# Patient Record
Sex: FEMALE | Race: WHITE | NOT HISPANIC OR LATINO | Employment: PART TIME | URBAN - NONMETROPOLITAN AREA
[De-identification: names, ages, dates, MRNs, and addresses within clinical notes are randomized per-mention and may not be internally consistent; named-entity substitution may affect disease eponyms.]

---

## 2017-10-05 ENCOUNTER — OFFICE VISIT (OUTPATIENT)
Dept: PSYCHIATRY | Facility: CLINIC | Age: 22
End: 2017-10-05

## 2017-10-05 VITALS
BODY MASS INDEX: 43.4 KG/M2 | HEIGHT: 69 IN | WEIGHT: 293 LBS | DIASTOLIC BLOOD PRESSURE: 93 MMHG | HEART RATE: 84 BPM | SYSTOLIC BLOOD PRESSURE: 147 MMHG

## 2017-10-05 DIAGNOSIS — F41.1 GENERALIZED ANXIETY DISORDER: ICD-10-CM

## 2017-10-05 DIAGNOSIS — F33.1 MAJOR DEPRESSIVE DISORDER, RECURRENT EPISODE, MODERATE (HCC): Primary | ICD-10-CM

## 2017-10-05 PROCEDURE — 90792 PSYCH DIAG EVAL W/MED SRVCS: CPT | Performed by: NURSE PRACTITIONER

## 2017-10-05 RX ORDER — PAROXETINE HYDROCHLORIDE 20 MG/1
TABLET, FILM COATED ORAL
COMMUNITY
Start: 2017-09-11 | End: 2017-10-05 | Stop reason: SDUPTHER

## 2017-10-05 RX ORDER — PAROXETINE HYDROCHLORIDE 20 MG/1
20 TABLET, FILM COATED ORAL EVERY EVENING
Qty: 30 TABLET | Refills: 0 | Status: SHIPPED | OUTPATIENT
Start: 2017-10-05 | End: 2017-11-01 | Stop reason: SDUPTHER

## 2017-10-05 RX ORDER — BUPROPION HYDROCHLORIDE 150 MG/1
TABLET, EXTENDED RELEASE ORAL
COMMUNITY
Start: 2017-09-11 | End: 2017-10-05 | Stop reason: SDUPTHER

## 2017-10-05 RX ORDER — BUPROPION HYDROCHLORIDE 150 MG/1
150 TABLET, EXTENDED RELEASE ORAL DAILY
Qty: 30 TABLET | Refills: 0 | Status: SHIPPED | OUTPATIENT
Start: 2017-10-05 | End: 2017-11-01 | Stop reason: SDUPTHER

## 2017-10-05 NOTE — PROGRESS NOTES
"Subjective   Kamryn Gross is a 22 y.o. female who is here today for initial appointment to evaluate for medication options.  She states that she was referred by family with her father seeing Dr Naidu and is having symptoms of depression and anxiety.     Chief Complaint:  Anxiety, depression    History of Present Illness  She states that she has problems with depression and anxiety, she was previously seeing a counselor and provider at [facility] but with numerous different providers she decided that she needed to seek services elsewhere.  She states that she has been diagnosed with mood disorder, severe anxiety, and depression.  She states that she gets sad a lot, she doesn't like hanging with family when she is in a bad state because it makes it worse.  She states that she has crying spells at times because she gets aggravated.  She states that her energy levels are \"not good\"- she feels tired a lot; shares that she use to get out and do things but now she lacks the motivation even to take care of ADLs.  She states that she tends to isolate because she is easily agitated by others.   Reports that she has feelings of being worthless, useless, and hopeless.  She states that she has difficulty falling asleep, she states that she may average between 4-5 hours per night but have nights that she doesn't sleep at all- she has been up for a maximum of 5 days before.  She reports that she has NM of random events that involve death.  She states that her appetite has decreased, reports that she has lost about 16 pounds in 2 months.  Anxiety: worries all the time, feels on edge, overwhelmed, \"what if?\" thoughts, believes that the worse is gong to happen, difficult time with focus, panic attacks with last episode about 2 days ago but unable to identify a trigger symptoms of being shaky, racing heart, cold chills.  She reports difficulty controlling her anger, she can be verbally aggressive towards other, \"hurt peoples feelings\", " "denies any physical or destruction of property.  Denies any prolonged matt, reports that she is impulsive both physical and verbally.  Reports OCD tendencies; denies any ADHD symptoms.  Denies any PTSD symptoms.  Denies any AV hallucinations, denies any paranoia or delusions.  Denies any SI/HI, but has history of self-mutilating behaviors.  Reports symptoms since childhood.      Past Psych History: Previous hospitalization X 2 for suicidal thoughts, previously seen at [facility] for treatment.  History of cutting with last episode about 2 days ago- noted to have superficial cuts to her left wrist, tattoos X 3, tongue/ears/nose piercing.  Denies any history of assault or arrest for violence.  Denies any history of abuse or trauma.     Previous Psych Meds: She is currently on wellbutrin and paxil- currently she feels like they are effective \"when I take them\". Zoloft, seroquel, prazosin, effexor    Substance Abuse: Denies any ETOH, THC, illicit drugs, RX drug abuse.  Smoker: 1/2 ppd.  Caffeine: 24 oz weekly.   MARCUS: no controlled substances noted.  UDS- negative.    Social History: She is currently living in [place] with parents, aunt, and 2 sisters.  Never , current relationship X 4 months and somewhat supportive.  No children.  Graduated from , 1 year of college, able to read and comprehend with no problems.  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  No legal issues.  No .  \"Believes in God\".     Family Psychiatric History: both parents has depression and anxiety, no history of family suicide attempts.  No substance use.      Medical/Surgical History: No history of seizures or concussions.  BC- none currently.  PCP: [facility] ([place]     No Known Allergies    Current Medications:   Current Outpatient Prescriptions   Medication Sig Dispense Refill   • buPROPion SR (WELLBUTRIN SR) 150 MG 12 hr tablet Take 1 tablet by mouth Daily. 30 tablet 0   • PARoxetine (PAXIL) 20 MG tablet Take 1 tablet by " "mouth Every Evening. 30 tablet 0     No current facility-administered medications for this visit.        Review of Systems   Constitutional: Negative for appetite change, chills, diaphoresis, fatigue, fever and unexpected weight change.   HENT: Negative for hearing loss, sore throat, trouble swallowing and voice change.    Eyes: Negative for photophobia and visual disturbance.   Respiratory: Negative for cough, chest tightness and shortness of breath.    Cardiovascular: Negative for chest pain and palpitations.   Gastrointestinal: Negative for abdominal pain, constipation, nausea and vomiting.   Endocrine: Negative for cold intolerance and heat intolerance.   Genitourinary: Negative for dysuria and frequency.   Musculoskeletal: Negative for arthralgias, back pain, joint swelling and neck stiffness.   Skin: Negative for color change and wound.   Allergic/Immunologic: Negative for environmental allergies and immunocompromised state.   Neurological: Negative for dizziness, tremors, seizures, syncope, weakness, light-headedness and headaches.   Hematological: Negative for adenopathy. Does not bruise/bleed easily.      Objective   Physical Exam   Constitutional: She appears well-developed and well-nourished. No distress.   Neurological: She is alert. Coordination and gait normal.   Vitals reviewed.    Blood pressure 147/93, pulse 84, height 69\" (175.3 cm), weight (!) 361 lb (164 kg).    Mental Status Exam:   Hygiene:   fair  Cooperation:  Cooperative  Eye Contact:  Fair  Psychomotor Behavior:  Appropriate  Affect:  Appropriate  Hopelessness: Denies  Speech:  Normal  Thought Process:  Goal directed and Linear  Thought Content:  Mood congurent  Suicidal:  None  Homicidal:  None  Hallucinations:  None  Delusion:  None  Memory:  Intact  Orientation:  Person, Place, Time and Situation  Reliability:  fair  Insight:  Fair  Judgement:  Fair  Impulse Control:  Fair  Physical/Medical Issues:  No     Short-term goals: Patient will " "be compliant with clinic appointments.  Patient will be engaged in therapy, medication compliant with minimal side effects. Patient  will report decrease of symptoms and frequency.    Long-term goals: Patient will have minimal symptoms of  with continued medication management. Patient will be compliant with treatment and appointments.     Problem list: anxiety and depression  Strengths: good personality  Weaknesses: \"looks\"    Assessment/Plan   Problems Addressed this Visit     None      Visit Diagnoses     Major depressive disorder, recurrent episode, moderate    -  Primary    Relevant Medications    buPROPion SR (WELLBUTRIN SR) 150 MG 12 hr tablet    PARoxetine (PAXIL) 20 MG tablet    Generalized anxiety disorder        Relevant Medications    buPROPion SR (WELLBUTRIN SR) 150 MG 12 hr tablet    PARoxetine (PAXIL) 20 MG tablet          Discussed medication options.  Begin wellbutrin for depression and paxil for anxiety and depression; continue hydroxyzine at night to assist with sleep as provided per PCP.  Discussed the risks, benefits, and side effects of the medication; client acknowledged and verbally consented.  Patient is aware to contact the [facility] with any worsening of symptom.  Patient is agreeable to go to the ER or call 911 should they begin SI/HI.  Begin therapy to address coping skills; and behavioral modification to avoid self-inflicting injuries.      Return in 4 weeks  "

## 2017-10-24 ENCOUNTER — OFFICE VISIT (OUTPATIENT)
Dept: PSYCHIATRY | Facility: CLINIC | Age: 22
End: 2017-10-24

## 2017-10-24 DIAGNOSIS — F41.0 PANIC ATTACK: ICD-10-CM

## 2017-10-24 DIAGNOSIS — F41.1 GENERALIZED ANXIETY DISORDER: Primary | ICD-10-CM

## 2017-10-24 DIAGNOSIS — F33.1 MAJOR DEPRESSIVE DISORDER, RECURRENT EPISODE, MODERATE (HCC): ICD-10-CM

## 2017-10-24 PROCEDURE — 90834 PSYTX W PT 45 MINUTES: CPT | Performed by: COUNSELOR
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
## 2017-11-01 RX ORDER — PAROXETINE HYDROCHLORIDE 20 MG/1
20 TABLET, FILM COATED ORAL EVERY EVENING
Qty: 30 TABLET | Refills: 0 | Status: SHIPPED | OUTPATIENT
Start: 2017-11-01 | End: 2017-11-30 | Stop reason: SDUPTHER

## 2017-11-01 RX ORDER — BUPROPION HYDROCHLORIDE 150 MG/1
150 TABLET, EXTENDED RELEASE ORAL DAILY
Qty: 30 TABLET | Refills: 0 | Status: SHIPPED | OUTPATIENT
Start: 2017-11-01 | End: 2017-11-30 | Stop reason: SDUPTHER

## 2017-11-30 ENCOUNTER — OFFICE VISIT (OUTPATIENT)
Dept: PSYCHIATRY | Facility: CLINIC | Age: 22
End: 2017-11-30

## 2017-11-30 VITALS
WEIGHT: 293 LBS | HEART RATE: 91 BPM | BODY MASS INDEX: 43.4 KG/M2 | SYSTOLIC BLOOD PRESSURE: 137 MMHG | DIASTOLIC BLOOD PRESSURE: 83 MMHG | HEIGHT: 69 IN

## 2017-11-30 DIAGNOSIS — F33.1 MAJOR DEPRESSIVE DISORDER, RECURRENT EPISODE, MODERATE (HCC): Primary | ICD-10-CM

## 2017-11-30 DIAGNOSIS — F41.1 GENERALIZED ANXIETY DISORDER: ICD-10-CM

## 2017-11-30 DIAGNOSIS — F41.0 PANIC ATTACK: ICD-10-CM

## 2017-11-30 PROCEDURE — 99213 OFFICE O/P EST LOW 20 MIN: CPT | Performed by: NURSE PRACTITIONER

## 2017-11-30 RX ORDER — HYDROXYZINE PAMOATE 25 MG/1
25 CAPSULE ORAL 3 TIMES DAILY PRN
Qty: 90 CAPSULE | Refills: 0 | Status: SHIPPED | OUTPATIENT
Start: 2017-11-30 | End: 2018-01-11 | Stop reason: SDUPTHER

## 2017-11-30 RX ORDER — BUPROPION HYDROCHLORIDE 150 MG/1
300 TABLET, EXTENDED RELEASE ORAL DAILY
Qty: 60 TABLET | Refills: 0 | Status: SHIPPED | OUTPATIENT
Start: 2017-11-30 | End: 2018-01-11 | Stop reason: DRUGHIGH

## 2017-11-30 RX ORDER — PAROXETINE HYDROCHLORIDE 20 MG/1
20 TABLET, FILM COATED ORAL EVERY EVENING
Qty: 30 TABLET | Refills: 0 | Status: SHIPPED | OUTPATIENT
Start: 2017-11-30 | End: 2018-01-11 | Stop reason: SDUPTHER
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
## 2018-01-11 ENCOUNTER — OFFICE VISIT (OUTPATIENT)
Dept: PSYCHIATRY | Facility: CLINIC | Age: 23
End: 2018-01-11

## 2018-01-11 VITALS
WEIGHT: 293 LBS | SYSTOLIC BLOOD PRESSURE: 132 MMHG | HEART RATE: 76 BPM | HEIGHT: 69 IN | DIASTOLIC BLOOD PRESSURE: 81 MMHG | BODY MASS INDEX: 43.4 KG/M2

## 2018-01-11 DIAGNOSIS — F33.1 MAJOR DEPRESSIVE DISORDER, RECURRENT EPISODE, MODERATE (HCC): Primary | ICD-10-CM

## 2018-01-11 DIAGNOSIS — F41.1 GENERALIZED ANXIETY DISORDER: ICD-10-CM

## 2018-01-11 PROCEDURE — 99213 OFFICE O/P EST LOW 20 MIN: CPT | Performed by: NURSE PRACTITIONER

## 2018-01-11 RX ORDER — PAROXETINE HYDROCHLORIDE 20 MG/1
20 TABLET, FILM COATED ORAL EVERY EVENING
Qty: 30 TABLET | Refills: 2 | Status: SHIPPED | OUTPATIENT
Start: 2018-01-11 | End: 2018-03-29 | Stop reason: SDUPTHER

## 2018-01-11 RX ORDER — HYDROXYZINE PAMOATE 25 MG/1
25 CAPSULE ORAL 3 TIMES DAILY PRN
Qty: 90 CAPSULE | Refills: 2 | Status: SHIPPED | OUTPATIENT
Start: 2018-01-11 | End: 2018-05-24

## 2018-01-11 RX ORDER — BUPROPION HYDROCHLORIDE 300 MG/1
300 TABLET ORAL EVERY MORNING
Qty: 30 TABLET | Refills: 2 | Status: SHIPPED | OUTPATIENT
Start: 2018-01-11 | End: 2018-03-29 | Stop reason: SDUPTHER

## 2018-01-11 NOTE — PROGRESS NOTES
"Subjective   Kamryn Gross is a 22 y.o. female who is here today for initial appointment to evaluate for medication options.  She states that she was referred by family with her father seeing Dr Naidu and is having symptoms of depression and anxiety.     Chief Complaint:  Anxiety, depression    History of Present Illness   She states that she is doing ok with the current medications, denies any SE or problems.  She states that with the increase in the medications she is no longer sleeping as much, she is more motivated to get up and do things.  She rates her depression 4/10 with 10 being the worse, she has random crying spells for no reason, and periods of isolation.  She rates her anxiety 7/10 with 10 being the worse, she states that she can have periods of her heart racing and begin to panic- takes a hydroxyzine but then wakes up still with anxiety.   Recommended that she have her thyroid monitored, states that she had blood work yesterday.  She states that she is sleeping about 10 hours per night with no NM.  Appetite has been good, she has lost 9 pounds over the Holidays- she is watching her portions sizes, etc.  She states that she is currently unemployed and not able to help out financial. She denies any new health issues.  Denies any AV hallucinations, denies any SI/HI.     Previous Psych Meds: She is currently on wellbutrin and paxil- currently she feels like they are effective \"when I take them\". Zoloft, seroquel, prazosin, effexor      No Known Allergies    Current Medications:   Current Outpatient Prescriptions   Medication Sig Dispense Refill   • buPROPion XL (WELLBUTRIN XL) 300 MG 24 hr tablet Take 1 tablet by mouth Every Morning. 30 tablet 2   • hydrOXYzine (VISTARIL) 25 MG capsule Take 1 capsule by mouth 3 (Three) Times a Day As Needed for Anxiety. 90 capsule 2   • PARoxetine (PAXIL) 20 MG tablet Take 1 tablet by mouth Every Evening. 30 tablet 2     No current facility-administered medications for " "this visit.        Review of Systems   Constitutional: Negative for appetite change, chills, diaphoresis, fatigue, fever and unexpected weight change.   HENT: Negative for hearing loss, sore throat, trouble swallowing and voice change.    Eyes: Negative for photophobia and visual disturbance.   Respiratory: Negative for cough, chest tightness and shortness of breath.    Cardiovascular: Negative for chest pain and palpitations.   Gastrointestinal: Negative for abdominal pain, constipation, nausea and vomiting.   Endocrine: Negative for cold intolerance and heat intolerance.   Genitourinary: Negative for dysuria and frequency.   Musculoskeletal: Negative for arthralgias, back pain, joint swelling and neck stiffness.   Skin: Negative for color change and wound.   Allergic/Immunologic: Negative for environmental allergies and immunocompromised state.   Neurological: Negative for dizziness, tremors, seizures, syncope, weakness, light-headedness and headaches.   Hematological: Negative for adenopathy. Does not bruise/bleed easily.      Objective   Physical Exam   Constitutional: She appears well-developed and well-nourished. No distress.   Neurological: She is alert. Coordination and gait normal.   Vitals reviewed.    Blood pressure 132/81, pulse 76, height 175.3 cm (69\"), weight (!) 158 kg (348 lb).    Mental Status Exam:   Hygiene:   fair  Cooperation:  Cooperative  Eye Contact:  Fair  Psychomotor Behavior:  Appropriate  Affect:  Appropriate  Hopelessness: Denies  Speech:  Normal  Thought Process:  Goal directed and Linear  Thought Content:  Mood congurent  Suicidal:  None  Homicidal:  None  Hallucinations:  None  Delusion:  None  Memory:  Intact  Orientation:  Person, Place, Time and Situation  Reliability:  fair  Insight:  Fair  Judgement:  Fair  Impulse Control:  Fair  Physical/Medical Issues:  No       Assessment/Plan   Problems Addressed this Visit     None      Visit Diagnoses     Major depressive disorder, " recurrent episode, moderate    -  Primary    Relevant Medications    hydrOXYzine (VISTARIL) 25 MG capsule    PARoxetine (PAXIL) 20 MG tablet    buPROPion XL (WELLBUTRIN XL) 300 MG 24 hr tablet    Generalized anxiety disorder        Relevant Medications    hydrOXYzine (VISTARIL) 25 MG capsule    PARoxetine (PAXIL) 20 MG tablet    buPROPion XL (WELLBUTRIN XL) 300 MG 24 hr tablet          Discussed medication options.  Discussed the risks, benefits, and side effects of the medication; client acknowledged and verbally consented.  Patient is aware to contact the [facility] with any worsening of symptom.  Patient is agreeable to go to the ER or call 911 should they begin SI/HI.  Begin therapy to address coping skills; and behavioral modification to avoid self-inflicting injuries.      Return in 12 weeks

## 2018-03-29 ENCOUNTER — OFFICE VISIT (OUTPATIENT)
Dept: PSYCHIATRY | Facility: CLINIC | Age: 23
End: 2018-03-29

## 2018-03-29 VITALS
WEIGHT: 293 LBS | HEART RATE: 88 BPM | SYSTOLIC BLOOD PRESSURE: 125 MMHG | DIASTOLIC BLOOD PRESSURE: 82 MMHG | BODY MASS INDEX: 43.4 KG/M2 | HEIGHT: 69 IN

## 2018-03-29 DIAGNOSIS — F41.1 GENERALIZED ANXIETY DISORDER: Primary | ICD-10-CM

## 2018-03-29 DIAGNOSIS — F33.1 MAJOR DEPRESSIVE DISORDER, RECURRENT EPISODE, MODERATE (HCC): ICD-10-CM

## 2018-03-29 PROCEDURE — 99214 OFFICE O/P EST MOD 30 MIN: CPT | Performed by: NURSE PRACTITIONER

## 2018-03-29 RX ORDER — BUPROPION HYDROCHLORIDE 300 MG/1
300 TABLET ORAL EVERY MORNING
Qty: 30 TABLET | Refills: 1 | Status: SHIPPED | OUTPATIENT
Start: 2018-03-29 | End: 2018-05-24 | Stop reason: SDUPTHER

## 2018-03-29 RX ORDER — PAROXETINE 30 MG/1
30 TABLET, FILM COATED ORAL EVERY EVENING
Qty: 30 TABLET | Refills: 1 | Status: SHIPPED | OUTPATIENT
Start: 2018-03-29 | End: 2018-05-24

## 2018-03-29 NOTE — PROGRESS NOTES
"Subjective   Kamryn Gross is a 22 y.o. female who is here today for initial appointment to evaluate for medication options.  She states that she was referred by family with her father seeing Dr Naidu and is having symptoms of depression and anxiety.     Chief Complaint:  Anxiety, depression    History of Present Illness  She states she is \"doing good.\" She states she feels more anxious lately and feels as though something \"needs upped.\" She denies any SE. She rates hear anxiety at 8/10 on scale of 1-10 with 10 being the worse. She shares that she is having increased heart rate, increased sweating, trembling in legs, worries and on edge. She rates her depression at 4/10 on scale of 1-10 with 10 being the worse. Reports symptoms crying episodes with no known reason but shares that this has improved. She states her sleep is \"not good.\" She shares she is having a hard time falling asleep due to \"Racing thoughts.\" She states she gets an average of 3 hours of sleep per night with few NM regarding death of her parents. She states her appetite has remained the same. 8 pound weight loss noted since last visit. Body mass index is 50.21 kg/m². She shares she has been trying to lose weight, shares she has cut down her portions and started walking at home. She shares that long distance relationship with her boyfriend has been stressing her out. She shares she is worried he may be talking to others where he lives. She denies any new medical issues or antibiotics since last visit. She denies any SI/HI. Denies any AV hallucinations.     Discussed increasing Paxil 30 mg daily for depression and anxiety. PCP is now prescribing hydroxyzine.     Previous Psych Meds: She is currently on wellbutrin and paxil- currently she feels like they are effective \"when I take them\". Zoloft, seroquel, prazosin, effexor      No Known Allergies    Current Medications:   Current Outpatient Prescriptions   Medication Sig Dispense Refill   • buPROPion " "XL (WELLBUTRIN XL) 300 MG 24 hr tablet Take 1 tablet by mouth Every Morning. 30 tablet 1   • hydrOXYzine (VISTARIL) 25 MG capsule Take 1 capsule by mouth 3 (Three) Times a Day As Needed for Anxiety. 90 capsule 2   • PARoxetine (PAXIL) 30 MG tablet Take 1 tablet by mouth Every Evening. 30 tablet 1     No current facility-administered medications for this visit.        Review of Systems   Constitutional: Negative for appetite change, chills, diaphoresis, fatigue, fever and unexpected weight change.   HENT: Negative for hearing loss, sore throat, trouble swallowing and voice change.    Eyes: Negative for photophobia and visual disturbance.   Respiratory: Negative for cough, chest tightness and shortness of breath.    Cardiovascular: Negative for chest pain and palpitations.   Gastrointestinal: Negative for abdominal pain, constipation, nausea and vomiting.   Endocrine: Negative for cold intolerance and heat intolerance.   Genitourinary: Negative for dysuria and frequency.   Musculoskeletal: Negative for arthralgias, back pain, joint swelling and neck stiffness.   Skin: Negative for color change and wound.   Allergic/Immunologic: Negative for environmental allergies and immunocompromised state.   Neurological: Negative for dizziness, tremors, seizures, syncope, weakness, light-headedness and headaches.   Hematological: Negative for adenopathy. Does not bruise/bleed easily.      Objective   Physical Exam   Constitutional: She appears well-developed and well-nourished. No distress.   Neurological: She is alert. Coordination and gait normal.   Vitals reviewed.    Blood pressure 125/82, pulse 88, height 175.3 cm (69\"), weight (!) 154 kg (340 lb).    Mental Status Exam:   Hygiene:   fair  Cooperation:  Cooperative  Eye Contact:  Fair  Psychomotor Behavior:  Appropriate  Affect:  Appropriate  Hopelessness: Denies  Speech:  Normal  Thought Process:  Goal directed and Linear  Thought Content:  Mood congurent  Suicidal:  " None  Homicidal:  None  Hallucinations:  None  Delusion:  None  Memory:  Intact  Orientation:  Person, Place, Time and Situation  Reliability:  fair  Insight:  Fair  Judgement:  Fair  Impulse Control:  Fair  Physical/Medical Issues:  No       Assessment/Plan   Problems Addressed this Visit     None      Visit Diagnoses     Generalized anxiety disorder    -  Primary    Relevant Medications    PARoxetine (PAXIL) 30 MG tablet    buPROPion XL (WELLBUTRIN XL) 300 MG 24 hr tablet    Major depressive disorder, recurrent episode, moderate        Relevant Medications    PARoxetine (PAXIL) 30 MG tablet    buPROPion XL (WELLBUTRIN XL) 300 MG 24 hr tablet          Discussed medication options. Will try increase in Paxil to assist with increased anxiety. PCP is now prescribing Vistaril.  Discussed the risks, benefits, and side effects of the medication; client acknowledged and verbally consented.  Patient is aware to contact the [facility] with any worsening of symptom.  Patient is agreeable to go to the ER or call 911 should they begin SI/HI.  Begin therapy to address coping skills; and behavioral modification to avoid self-inflicting injuries.      Return in 8 weeks

## 2018-05-24 ENCOUNTER — OFFICE VISIT (OUTPATIENT)
Dept: PSYCHIATRY | Facility: CLINIC | Age: 23
End: 2018-05-24

## 2018-05-24 VITALS
SYSTOLIC BLOOD PRESSURE: 122 MMHG | HEART RATE: 82 BPM | DIASTOLIC BLOOD PRESSURE: 78 MMHG | HEIGHT: 69 IN | BODY MASS INDEX: 43.4 KG/M2 | WEIGHT: 293 LBS

## 2018-05-24 DIAGNOSIS — F41.1 GENERALIZED ANXIETY DISORDER: Primary | ICD-10-CM

## 2018-05-24 DIAGNOSIS — F33.1 MAJOR DEPRESSIVE DISORDER, RECURRENT EPISODE, MODERATE (HCC): ICD-10-CM

## 2018-05-24 DIAGNOSIS — F60.3 BORDERLINE PERSONALITY DISORDER (HCC): ICD-10-CM

## 2018-05-24 PROCEDURE — 99214 OFFICE O/P EST MOD 30 MIN: CPT | Performed by: NURSE PRACTITIONER

## 2018-05-24 RX ORDER — PAROXETINE HYDROCHLORIDE 40 MG/1
40 TABLET, FILM COATED ORAL EVERY MORNING
Qty: 30 TABLET | Refills: 0 | Status: SHIPPED | OUTPATIENT
Start: 2018-05-24 | End: 2018-06-28 | Stop reason: SDUPTHER

## 2018-05-24 RX ORDER — PAROXETINE HYDROCHLORIDE 40 MG/1
TABLET, FILM COATED ORAL
COMMUNITY
Start: 2018-05-17 | End: 2018-05-24 | Stop reason: SDUPTHER

## 2018-05-24 RX ORDER — OXCARBAZEPINE 150 MG/1
150 TABLET, FILM COATED ORAL 2 TIMES DAILY
Qty: 60 TABLET | Refills: 0 | Status: SHIPPED | OUTPATIENT
Start: 2018-05-24 | End: 2018-06-28 | Stop reason: SDUPTHER

## 2018-05-24 RX ORDER — BUPROPION HYDROCHLORIDE 300 MG/1
300 TABLET ORAL EVERY MORNING
Qty: 30 TABLET | Refills: 0 | Status: SHIPPED | OUTPATIENT
Start: 2018-05-24 | End: 2018-06-28 | Stop reason: SDUPTHER

## 2018-05-24 NOTE — PROGRESS NOTES
"  Subjective   Kamryn Gross is a 22 y.o. female is here today for medication management follow-up at [facility], she presents to her appointment on time with her mother with whom she gives permission to speak in front of openly.    Chief Complaint: Depression and anxiety      History of Present Illness  She states that she is not doing well with her medications; she states that she has a lot of things going on.  She states that she went to [facility] last Monday May 14.  She states that she had been feeling depressed, didn't want to deal with people, she didn't feel wanted.  She states that she took a handful of vistaril, she had told a friend that she didn't want to live and the friend contacted mother on messenger to report it.  Mother contacted the ambulance after patient was checked on and she was \"out\"; she was not charcoaled but was placed on fluids and heart monitor.  She was placed inpatient for 4 days; she didn't have any medication changes.  She was Paxil and Wellbutrin but doesn't feel like it is helpful.  She states that she is staying secluded, a lot of anxiety, she has no energy or motivation and had to make self get up and care for her ADLs, she states that she has crying spells for no particular reason.  She states that she has feelings of being worthless, useless, and hopeless.  She states that she doesn't sleep well because she can't shut her brain, 2-3 hours per night with no NM.  Appetite is decreased; \" I am just not hungry\".  Body mass index is 48.44 kg/m².  She states that she worries day by day about the littlest things with panic attacks with shakes, scared, heart beats fast.  Anger: she has problems controlling her anger, she tends to physically violent and hits objects with verbal aggression.  Denies any history of abuse; denies any trauma.  She states that she is mumbles, unable to make out what is being said; denies any command hallucinations.  Denies any visual " hallucinations.  She states that she has been having suicidal thoughts with last episode yesterday but thoughts went away when Carlton (dawit) got there.   She adamantly denies any current plan or intent.  Denies any intent on SI.   She denies any physical health issues.  She currently is stressed out because of where she lives; she lives in a bad neighborhood with her parent.  She states that the only time she gets attention is when she acts out.        Mother states that she has mood swings, can't keep a job because she can't deal with people.  She states that the seroquel was not effective at all for her.      Will continue paxil and Wellbutrin but add stabilizer.  Will begin trileptal for her mood.  Recommended OTC benadryl to assist with sleep.          The following portions of the patient's history were reviewed and updated as appropriate: allergies, current medications, past family history, past medical history, past social history, past surgical history and problem list.    Review of Systems   Constitutional: Negative for appetite change, chills, diaphoresis, fatigue, fever and unexpected weight change.   HENT: Negative for hearing loss, sore throat, trouble swallowing and voice change.    Eyes: Negative for photophobia and visual disturbance.   Respiratory: Negative for cough, chest tightness and shortness of breath.    Cardiovascular: Negative for chest pain and palpitations.   Gastrointestinal: Negative for abdominal pain, constipation, nausea and vomiting.   Endocrine: Negative for cold intolerance and heat intolerance.   Genitourinary: Negative for dysuria and frequency.   Musculoskeletal: Negative for arthralgias, back pain, joint swelling and neck stiffness.   Skin: Negative for color change and wound.   Allergic/Immunologic: Negative for environmental allergies and immunocompromised state.   Neurological: Negative for dizziness, tremors, seizures, syncope, weakness, light-headedness and headaches.  "  Hematological: Negative for adenopathy. Does not bruise/bleed easily.       Objective   Physical Exam   Constitutional: She appears well-developed and well-nourished. No distress.   Neurological: She is alert. Coordination and gait normal.   Vitals reviewed.    Blood pressure 122/78, pulse 82, height 175.3 cm (69\"), weight (!) 149 kg (328 lb).    Medication List:   Current Outpatient Prescriptions   Medication Sig Dispense Refill   • buPROPion XL (WELLBUTRIN XL) 300 MG 24 hr tablet Take 1 tablet by mouth Every Morning. 30 tablet 0   • OXcarbazepine (TRILEPTAL) 150 MG tablet Take 1 tablet by mouth 2 (Two) Times a Day. 60 tablet 0   • PARoxetine (PAXIL) 40 MG tablet Take 1 tablet by mouth Every Morning. 30 tablet 0     No current facility-administered medications for this visit.        Mental Status Exam:   Hygiene:   fair  Cooperation:  Guarded  Eye Contact:  Fair  Psychomotor Behavior:  Slow  Affect:  Blunted  Hopelessness: 5  Speech:  Monotone  Thought Process:  Linear  Thought Content:  Mood congurent  Suicidal:  Suicidal Ideation  Homicidal:  None  Hallucinations:  None  Delusion:  None  Memory:  Intact  Orientation:  Person, Place, Time and Situation  Reliability:  fair  Insight:  Poor  Judgement:  Poor  Impulse Control:  Poor  Physical/Medical Issues:  No     Assessment/Plan   Problems Addressed this Visit     None      Visit Diagnoses     Generalized anxiety disorder    -  Primary    Relevant Medications    buPROPion XL (WELLBUTRIN XL) 300 MG 24 hr tablet    PARoxetine (PAXIL) 40 MG tablet    Major depressive disorder, recurrent episode, moderate        Relevant Medications    buPROPion XL (WELLBUTRIN XL) 300 MG 24 hr tablet    PARoxetine (PAXIL) 40 MG tablet    Borderline personality disorder        Relevant Medications    buPROPion XL (WELLBUTRIN XL) 300 MG 24 hr tablet    PARoxetine (PAXIL) 40 MG tablet        Trileptal 150mg twice daily- mood      Discussed medication options.  Reviewed the risks, " benefits, and side effects of the medications; patient acknowledged and verbally consented.  Patient is agreeable to call the [facility].  Patient is aware to call 911 or go to the nearest ER should begin having SI/HI.     Prognosis: Guarded dependent on medication, follow up appointment and treatment plan compliance     Functionality: Poor.  Symptoms inhibit her ability to communicate her feelings with others in a self safe environment.     Return in 4 weeks at [facility] related to uncertainty of behaviors.

## 2018-06-29 RX ORDER — BUPROPION HYDROCHLORIDE 300 MG/1
300 TABLET ORAL EVERY MORNING
Qty: 30 TABLET | Refills: 0 | Status: SHIPPED | OUTPATIENT
Start: 2018-06-29 | End: 2018-07-18 | Stop reason: SDUPTHER

## 2018-06-29 RX ORDER — OXCARBAZEPINE 150 MG/1
150 TABLET, FILM COATED ORAL 2 TIMES DAILY
Qty: 60 TABLET | Refills: 0 | Status: SHIPPED | OUTPATIENT
Start: 2018-06-29 | End: 2018-07-18 | Stop reason: SDUPTHER

## 2018-06-29 RX ORDER — PAROXETINE HYDROCHLORIDE 40 MG/1
40 TABLET, FILM COATED ORAL EVERY MORNING
Qty: 30 TABLET | Refills: 0 | Status: SHIPPED | OUTPATIENT
Start: 2018-06-29 | End: 2018-07-18 | Stop reason: SDUPTHER

## 2018-07-18 RX ORDER — BUPROPION HYDROCHLORIDE 300 MG/1
300 TABLET ORAL EVERY MORNING
Qty: 30 TABLET | Refills: 0 | Status: SHIPPED | OUTPATIENT
Start: 2018-07-18 | End: 2020-11-06

## 2018-07-18 RX ORDER — OXCARBAZEPINE 150 MG/1
150 TABLET, FILM COATED ORAL 2 TIMES DAILY
Qty: 60 TABLET | Refills: 0 | Status: SHIPPED | OUTPATIENT
Start: 2018-07-18 | End: 2018-07-26 | Stop reason: SDUPTHER

## 2018-07-18 RX ORDER — PAROXETINE HYDROCHLORIDE 40 MG/1
40 TABLET, FILM COATED ORAL EVERY MORNING
Qty: 30 TABLET | Refills: 0 | Status: SHIPPED | OUTPATIENT
Start: 2018-07-18 | End: 2020-10-13 | Stop reason: ALTCHOICE

## 2018-07-26 RX ORDER — OXCARBAZEPINE 150 MG/1
150 TABLET, FILM COATED ORAL 2 TIMES DAILY
Qty: 60 TABLET | Refills: 0 | Status: SHIPPED | OUTPATIENT
Start: 2018-07-26 | End: 2020-10-13 | Stop reason: ALTCHOICE

## 2020-10-13 ENCOUNTER — OFFICE VISIT (OUTPATIENT)
Dept: FAMILY MEDICINE CLINIC | Facility: CLINIC | Age: 25
End: 2020-10-13

## 2020-10-13 VITALS
HEIGHT: 70 IN | HEART RATE: 72 BPM | SYSTOLIC BLOOD PRESSURE: 120 MMHG | TEMPERATURE: 97.8 F | OXYGEN SATURATION: 98 % | DIASTOLIC BLOOD PRESSURE: 82 MMHG | WEIGHT: 293 LBS | BODY MASS INDEX: 41.95 KG/M2 | RESPIRATION RATE: 20 BRPM

## 2020-10-13 DIAGNOSIS — R51.9 ACUTE NONINTRACTABLE HEADACHE, UNSPECIFIED HEADACHE TYPE: Primary | ICD-10-CM

## 2020-10-13 PROCEDURE — 99213 OFFICE O/P EST LOW 20 MIN: CPT | Performed by: FAMILY MEDICINE

## 2020-10-13 RX ORDER — BUPROPION HYDROCHLORIDE 150 MG/1
150 TABLET ORAL DAILY
COMMUNITY
End: 2020-11-06 | Stop reason: SDUPTHER

## 2020-10-13 RX ORDER — SUMATRIPTAN 25 MG/1
TABLET, FILM COATED ORAL
Qty: 10 TABLET | Refills: 0 | Status: SHIPPED | OUTPATIENT
Start: 2020-10-13 | End: 2020-12-11 | Stop reason: SDUPTHER

## 2020-10-13 RX ORDER — FLUOXETINE HYDROCHLORIDE 20 MG/1
20 CAPSULE ORAL DAILY
COMMUNITY
End: 2020-11-06 | Stop reason: SDUPTHER

## 2020-10-13 NOTE — PATIENT INSTRUCTIONS
General Headache Without Cause  A headache is pain or discomfort that is felt around the head or neck area. There are many causes and types of headaches. In some cases, the cause may not be found.  Follow these instructions at home:  Watch your condition for any changes. Let your doctor know about them. Take these steps to help with your condition:  Managing pain         · Take over-the-counter and prescription medicines only as told by your doctor.  · Lie down in a dark, quiet room when you have a headache.  · If told, put ice on your head and neck area:  ? Put ice in a plastic bag.  ? Place a towel between your skin and the bag.  ? Leave the ice on for 20 minutes, 2-3 times per day.  · If told, put heat on the affected area. Use the heat source that your doctor recommends, such as a moist heat pack or a heating pad.  ? Place a towel between your skin and the heat source.  ? Leave the heat on for 20-30 minutes.  ? Remove the heat if your skin turns bright red. This is very important if you are unable to feel pain, heat, or cold. You may have a greater risk of getting burned.  · Keep lights dim if bright lights bother you or make your headaches worse.  Eating and drinking  · Eat meals on a regular schedule.  · If you drink alcohol:  ? Limit how much you use to:  § 0-1 drink a day for women.  § 0-2 drinks a day for men.  ? Be aware of how much alcohol is in your drink. In the [place] one drink equals one 12 oz bottle of beer (355 mL), one 5 oz glass of wine (148 mL), or one 1½ oz glass of hard liquor (44 mL).  · Stop drinking caffeine, or reduce how much caffeine you drink.  General instructions    · Keep a journal to find out if certain things bring on headaches. For example, write down:  ? What you eat and drink.  ? How much sleep you get.  ? Any change to your diet or medicines.  · Get a massage or try other ways to relax.  · Limit stress.  · Sit up straight. Do not tighten (tense) your muscles.  · Do not use any  products that contain nicotine or tobacco. This includes cigarettes, e-cigarettes, and chewing tobacco. If you need help quitting, ask your doctor.  · Exercise regularly as told by your doctor.  · Get enough sleep. This often means 7-9 hours of sleep each night.  · Keep all follow-up visits as told by your doctor. This is important.  Contact a doctor if:  · Your symptoms are not helped by medicine.  · You have a headache that feels different than the other headaches.  · You feel sick to your stomach (nauseous) or you throw up (vomit).  · You have a fever.  Get help right away if:  · Your headache gets very bad quickly.  · Your headache gets worse after a lot of physical activity.  · You keep throwing up.  · You have a stiff neck.  · You have trouble seeing.  · You have trouble speaking.  · You have pain in the eye or ear.  · Your muscles are weak or you lose muscle control.  · You lose your balance or have trouble walking.  · You feel like you will pass out (faint) or you pass out.  · You are mixed up (confused).  · You have a seizure.  Summary  · A headache is pain or discomfort that is felt around the head or neck area.  · There are many causes and types of headaches. In some cases, the cause may not be found.  · Keep a journal to help find out what causes your headaches. Watch your condition for any changes. Let your doctor know about them.  · Contact a doctor if you have a headache that is different from usual, or if your headache is not helped by medicine.  · Get help right away if your headache gets very bad, you throw up, you have trouble seeing, you lose your balance, or you have a seizure.  This information is not intended to replace advice given to you by your health care provider. Make sure you discuss any questions you have with your health care provider.  Document Released: 09/26/2009 Document Revised: 07/08/2019 Document Reviewed: 07/08/2019  Elsevier Patient Education © 2020 [employer]    Migraine  Headache  A migraine headache is a very strong throbbing pain on one side or both sides of your head. This type of headache can also cause other symptoms. It can last from 4 hours to 3 days. Talk with your doctor about what things may bring on (trigger) this condition.  What are the causes?  The exact cause of this condition is not known. This condition may be triggered or caused by:  · Drinking alcohol.  · Smoking.  · Taking medicines, such as:  ? Medicine used to treat chest pain (nitroglycerin).  ? Birth control pills.  ? Estrogen.  ? Some blood pressure medicines.  · Eating or drinking certain products.  · Doing physical activity.  Other things that may trigger a migraine headache include:  · Having a menstrual period.  · Pregnancy.  · Hunger.  · Stress.  · Not getting enough sleep or getting too much sleep.  · Weather changes.  · Tiredness (fatigue).  What increases the risk?  · Being 25-55 years old.  · Being female.  · Having a family history of migraine headaches.  · Being .  · Having depression or anxiety.  · Being very overweight.  What are the signs or symptoms?  · A throbbing pain. This pain may:  ? Happen in any area of the head, such as on one side or both sides.  ? Make it hard to do daily activities.  ? Get worse with physical activity.  ? Get worse around bright lights or loud noises.  · Other symptoms may include:  ? Feeling sick to your stomach (nauseous).  ? Vomiting.  ? Dizziness.  ? Being sensitive to bright lights, loud noises, or smells.  · Before you get a migraine headache, you may get warning signs (an aura). An aura may include:  ? Seeing flashing lights or having blind spots.  ? Seeing bright spots, halos, or zigzag lines.  ? Having tunnel vision or blurred vision.  ? Having numbness or a tingling feeling.  ? Having trouble talking.  ? Having weak muscles.  · Some people have symptoms after a migraine headache (postdromal phase), such as:  ? Tiredness.  ? Trouble thinking  (concentrating).  How is this treated?  · Taking medicines that:  ? Relieve pain.  ? Relieve the feeling of being sick to your stomach.  ? Prevent migraine headaches.  · Treatment may also include:  ? Having acupuncture.  ? Avoiding foods that bring on migraine headaches.  ? Learning ways to control your body functions (biofeedback).  ? Therapy to help you know and deal with negative thoughts (cognitive behavioral therapy).  Follow these instructions at home:  Medicines  · Take over-the-counter and prescription medicines only as told by your doctor.  · Ask your doctor if the medicine prescribed to you:  ? Requires you to avoid driving or using heavy machinery.  ? Can cause trouble pooping (constipation). You may need to take these steps to prevent or treat trouble pooping:  § Drink enough fluid to keep your pee (urine) pale yellow.  § Take over-the-counter or prescription medicines.  § Eat foods that are high in fiber. These include beans, whole grains, and fresh fruits and vegetables.  § Limit foods that are high in fat and sugar. These include fried or sweet foods.  Lifestyle  · Do not drink alcohol.  · Do not use any products that contain nicotine or tobacco, such as cigarettes, e-cigarettes, and chewing tobacco. If you need help quitting, ask your doctor.  · Get at least 8 hours of sleep every night.  · Limit and deal with stress.  General instructions         · Keep a journal to find out what may bring on your migraine headaches. For example, write down:  ? What you eat and drink.  ? How much sleep you get.  ? Any change in what you eat or drink.  ? Any change in your medicines.  · If you have a migraine headache:  ? Avoid things that make your symptoms worse, such as bright lights.  ? It may help to lie down in a dark, quiet room.  ? Do not drive or use heavy machinery.  ? Ask your doctor what activities are safe for you.  · Keep all follow-up visits as told by your doctor. This is important.  Contact a doctor  if:  · You get a migraine headache that is different or worse than others you have had.  · You have more than 15 headache days in one month.  Get help right away if:  · Your migraine headache gets very bad.  · Your migraine headache lasts longer than 72 hours.  · You have a fever.  · You have a stiff neck.  · You have trouble seeing.  · Your muscles feel weak or like you cannot control them.  · You start to lose your balance a lot.  · You start to have trouble walking.  · You pass out (faint).  · You have a seizure.  Summary  · A migraine headache is a very strong throbbing pain on one side or both sides of your head. These headaches can also cause other symptoms.  · This condition may be treated with medicines and changes to your lifestyle.  · Keep a journal to find out what may bring on your migraine headaches.  · Contact a doctor if you get a migraine headache that is different or worse than others you have had.  · Contact your doctor if you have more than 15 headache days in a month.  This information is not intended to replace advice given to you by your health care provider. Make sure you discuss any questions you have with your health care provider.  Document Released: 09/26/2009 Document Revised: 04/10/2020 Document Reviewed: 01/30/2020  Elsevier Patient Education © 2020 [employer]

## 2020-10-15 ENCOUNTER — OFFICE VISIT (OUTPATIENT)
Dept: FAMILY MEDICINE CLINIC | Facility: CLINIC | Age: 25
End: 2020-10-15

## 2020-10-15 VITALS
WEIGHT: 293 LBS | HEIGHT: 70 IN | TEMPERATURE: 98 F | BODY MASS INDEX: 41.95 KG/M2 | HEART RATE: 66 BPM | DIASTOLIC BLOOD PRESSURE: 76 MMHG | SYSTOLIC BLOOD PRESSURE: 131 MMHG | OXYGEN SATURATION: 98 %

## 2020-10-15 DIAGNOSIS — Z00.00 ROUTINE GENERAL MEDICAL EXAMINATION AT A HEALTH CARE FACILITY: ICD-10-CM

## 2020-10-15 DIAGNOSIS — F41.9 ANXIETY: ICD-10-CM

## 2020-10-15 DIAGNOSIS — R51.9 ACUTE NONINTRACTABLE HEADACHE, UNSPECIFIED HEADACHE TYPE: ICD-10-CM

## 2020-10-15 DIAGNOSIS — F33.1 MAJOR DEPRESSIVE DISORDER, RECURRENT, MODERATE (HCC): Primary | ICD-10-CM

## 2020-10-15 DIAGNOSIS — E66.01 CLASS 3 SEVERE OBESITY WITHOUT SERIOUS COMORBIDITY WITH BODY MASS INDEX (BMI) OF 50.0 TO 59.9 IN ADULT, UNSPECIFIED OBESITY TYPE (HCC): ICD-10-CM

## 2020-10-15 PROBLEM — E66.813 CLASS 3 SEVERE OBESITY WITHOUT SERIOUS COMORBIDITY WITH BODY MASS INDEX (BMI) OF 50.0 TO 59.9 IN ADULT: Status: ACTIVE | Noted: 2020-10-15

## 2020-10-15 LAB
C TRACH RRNA CVX QL NAA+PROBE: NOT DETECTED
CLARITY, POC: CLEAR
COLOR UR: YELLOW
GLUCOSE UR STRIP-MCNC: NEGATIVE MG/DL
LEUKOCYTE EST, POC: NEGATIVE
N GONORRHOEA RRNA SPEC QL NAA+PROBE: NOT DETECTED
NITRITE UR-MCNC: POSITIVE MG/ML
PROT UR STRIP-MCNC: NEGATIVE MG/DL
RBC # UR STRIP: NEGATIVE /UL

## 2020-10-15 PROCEDURE — 99204 OFFICE O/P NEW MOD 45 MIN: CPT | Performed by: FAMILY MEDICINE

## 2020-10-15 PROCEDURE — 87491 CHLMYD TRACH DNA AMP PROBE: CPT | Performed by: FAMILY MEDICINE

## 2020-10-15 PROCEDURE — 80061 LIPID PANEL: CPT | Performed by: FAMILY MEDICINE

## 2020-10-15 PROCEDURE — 85025 COMPLETE CBC W/AUTO DIFF WBC: CPT | Performed by: FAMILY MEDICINE

## 2020-10-15 PROCEDURE — 87591 N.GONORRHOEAE DNA AMP PROB: CPT | Performed by: FAMILY MEDICINE

## 2020-10-15 PROCEDURE — 80053 COMPREHEN METABOLIC PANEL: CPT | Performed by: FAMILY MEDICINE

## 2020-10-15 PROCEDURE — 80074 ACUTE HEPATITIS PANEL: CPT | Performed by: FAMILY MEDICINE

## 2020-10-15 PROCEDURE — 84443 ASSAY THYROID STIM HORMONE: CPT | Performed by: FAMILY MEDICINE

## 2020-10-15 NOTE — PATIENT INSTRUCTIONS
Major Depressive Disorder, Adult  Major depressive disorder (MDD) is a mental health condition. MDD often makes you feel sad, hopeless, or helpless. MDD can also cause symptoms in your body. MDD can affect your:  · Work.  · School.  · Relationships.  · Other normal activities.  MDD can range from mild to very bad. It may occur once (single episode MDD). It can also occur many times (recurrent MDD).  The main symptoms of MDD often include:  · Feeling sad, depressed, or irritable most of the time.  · Loss of interest.  MDD symptoms also include:  · Sleeping too much or too little.  · Eating too much or too little.  · A change in your weight.  · Feeling tired (fatigue) or having low energy.  · Feeling worthless.  · Feeling guilty.  · Trouble making decisions.  · Trouble thinking clearly.  · Thoughts of suicide or harming others.  · Feeling weak.  · Feeling agitated.  · Keeping yourself from being around other people (isolation).  Follow these instructions at home:  Activity  · Do these things as told by your doctor:  ? Go back to your normal activities.  ? Exercise regularly.  ? Spend time outdoors.  Alcohol  · Talk with your doctor about how alcohol can affect your antidepressant medicines.  · Do not drink alcohol. Or, limit how much alcohol you drink.  ? This means no more than 1 drink a day for nonpregnant women and 2 drinks a day for men. One drink equals one of these:  § 12 oz of beer.  § 5 oz of wine.  § 1½ oz of hard liquor.  General instructions  · Take over-the-counter and prescription medicines only as told by your doctor.  · Eat a healthy diet.  · Get plenty of sleep.  · Find activities that you enjoy. Make time to do them.  · Think about joining a support group. Your doctor may be able to suggest a group for you.  · Keep all follow-up visits as told by your doctor. This is important.  Where to find more information:  · National [place] on Mental Illness:  ? www.fernanda.org  · [place] National [place] of Mental  Health:  ? www.[facility].gov  · National Suicide Prevention Lifeline:  ? 1-182.592.5590. This is free, 24-hour help.  Contact a doctor if:  · Your symptoms get worse.  · You have new symptoms.  Get help right away if:  · You self-harm.  · You see, hear, taste, smell, or feel things that are not present (hallucinate).  If you ever feel like you may hurt yourself or others, or have thoughts about taking your own life, get help right away. You can go to your nearest emergency department or call:  · Your local emergency services (911 in the [place]  · A suicide crisis helpline, such as the National Suicide Prevention Lifeline:  ? 1-715.312.4225. This is open 24 hours a day.  This information is not intended to replace advice given to you by your health care provider. Make sure you discuss any questions you have with your health care provider.  Document Released: 11/28/2016 Document Revised: 11/30/2018 Document Reviewed: 09/03/2017  [employer] Patient Education © 2020 [employer]      Generalized Anxiety Disorder, Adult  Generalized anxiety disorder (WILLY) is a mental health disorder. People with this condition constantly worry about everyday events. Unlike normal anxiety, worry related to WILLY is not triggered by a specific event. These worries also do not fade or get better with time. WILLY interferes with life functions, including relationships, work, and school.  WILLY can vary from mild to severe. People with severe WILLY can have intense waves of anxiety with physical symptoms (panic attacks).  What are the causes?  The exact cause of WILLY is not known.  What increases the risk?  This condition is more likely to develop in:  · Women.  · People who have a family history of anxiety disorders.  · People who are very shy.  · People who experience very stressful life events, such as the death of a loved one.  · People who have a very stressful family environment.  What are the signs or symptoms?  People with WILLY often worry  excessively about many things in their lives, such as their health and family. They may also be overly concerned about:  · Doing well at work.  · Being on time.  · Natural disasters.  · Friendships.  Physical symptoms of WILLY include:  · Fatigue.  · Muscle tension or having muscle twitches.  · Trembling or feeling shaky.  · Being easily startled.  · Feeling like your heart is pounding or racing.  · Feeling out of breath or like you cannot take a deep breath.  · Having trouble falling asleep or staying asleep.  · Sweating.  · Nausea, diarrhea, or irritable bowel syndrome (IBS).  · Headaches.  · Trouble concentrating or remembering facts.  · Restlessness.  · Irritability.  How is this diagnosed?  Your health care provider can diagnose WILLY based on your symptoms and medical history. You will also have a physical exam. The health care provider will ask specific questions about your symptoms, including how severe they are, when they started, and if they come and go. Your health care provider may ask you about your use of alcohol or drugs, including prescription medicines. Your health care provider may refer you to a mental health specialist for further evaluation.  Your health care provider will do a thorough examination and may perform additional tests to rule out other possible causes of your symptoms.  To be diagnosed with WILLY, a person must have anxiety that:  · Is out of his or her control.  · Affects several different aspects of his or her life, such as work and relationships.  · Causes distress that makes him or her unable to take part in normal activities.  · Includes at least three physical symptoms of WILLY, such as restlessness, fatigue, trouble concentrating, irritability, muscle tension, or sleep problems.  Before your health care provider can confirm a diagnosis of WILLY, these symptoms must be present more days than they are not, and they must last for six months or longer.  How is this treated?  The following  therapies are usually used to treat WILLY:  · Medicine. Antidepressant medicine is usually prescribed for long-term daily control. Antianxiety medicines may be added in severe cases, especially when panic attacks occur.  · Talk therapy (psychotherapy). Certain types of talk therapy can be helpful in treating WILLY by providing support, education, and guidance. Options include:  ? Cognitive behavioral therapy (CBT). People learn coping skills and techniques to ease their anxiety. They learn to identify unrealistic or negative thoughts and behaviors and to replace them with positive ones.  ? Acceptance and commitment therapy (ACT). This treatment teaches people how to be mindful as a way to cope with unwanted thoughts and feelings.  ? Biofeedback. This process trains you to manage your body's response (physiological response) through breathing techniques and relaxation methods. You will work with a therapist while machines are used to monitor your physical symptoms.  · Stress management techniques. These include yoga, meditation, and exercise.  A mental health specialist can help determine which treatment is best for you. Some people see improvement with one type of therapy. However, other people require a combination of therapies.  Follow these instructions at home:  · Take over-the-counter and prescription medicines only as told by your health care provider.  · Try to maintain a normal routine.  · Try to anticipate stressful situations and allow extra time to manage them.  · Practice any stress management or self-calming techniques as taught by your health care provider.  · Do not punish yourself for setbacks or for not making progress.  · Try to recognize your accomplishments, even if they are small.  · Keep all follow-up visits as told by your health care provider. This is important.  Contact a health care provider if:  · Your symptoms do not get better.  · Your symptoms get worse.  · You have signs of depression,  such as:  ? A persistently sad, cranky, or irritable mood.  ? Loss of enjoyment in activities that used to bring you narinder.  ? Change in weight or eating.  ? Changes in sleeping habits.  ? Avoiding friends or family members.  ? Loss of energy for normal tasks.  ? Feelings of guilt or worthlessness.  Get help right away if:  · You have serious thoughts about hurting yourself or others.  If you ever feel like you may hurt yourself or others, or have thoughts about taking your own life, get help right away. You can go to your nearest emergency department or call:  · Your local emergency services (911 in the [place]  · A suicide crisis helpline, such as the National Suicide Prevention Lifeline at 1-767.528.7226. This is open 24 hours a day.  Summary  · Generalized anxiety disorder (WILLY) is a mental health disorder that involves worry that is not triggered by a specific event.  · People with WILLY often worry excessively about many things in their lives, such as their health and family.  · WILLY may cause physical symptoms such as restlessness, trouble concentrating, sleep problems, frequent sweating, nausea, diarrhea, headaches, and trembling or muscle twitching.  · A mental health specialist can help determine which treatment is best for you. Some people see improvement with one type of therapy. However, other people require a combination of therapies.  This information is not intended to replace advice given to you by your health care provider. Make sure you discuss any questions you have with your health care provider.  Document Released: 04/14/2014 Document Revised: 11/30/2018 Document Reviewed: 11/07/2017  [employer] Patient Education © 2020 [employer]

## 2020-10-15 NOTE — PROGRESS NOTES
Subjective   Kamryn Gross is a 25 y.o. female who presents today for initial evaluation     Chief Complaint   Patient presents with   • Establish Care       The patient has history of Depression and Anxiety and she just move back here from [place] and needing a primary care physician. Patient states that she is doing well with her medications. Patient also with on and off headaches which responded well to the imitrex that I gave her last visit.       The following portions of the patient's history were reviewed and updated as appropriate: allergies, current medications, past family history, past medical history, past social history, past surgical history and problem list.    Past Medical History:  Past Medical History:   Diagnosis Date   • Anxiety    • Depression    • Headache        Social History:  Social History     Socioeconomic History   • Marital status: Single     Spouse name: Not on file   • Number of children: Not on file   • Years of education: Not on file   • Highest education level: Not on file   Tobacco Use   • Smoking status: Current Every Day Smoker     Packs/day: 0.50     Years: 4.00     Pack years: 2.00   • Smokeless tobacco: Never Used   • Tobacco comment: pt. reports she is not wanting to quit   Substance and Sexual Activity   • Alcohol use: Not Currently     Frequency: Never   • Drug use: Never   • Sexual activity: Yes     Partners: Male     Birth control/protection: None       Family History:  Family History   Problem Relation Age of Onset   • Diabetes Mother    • Anxiety disorder Mother    • Depression Mother    • Anxiety disorder Father    • Depression Father        Past Surgical History:  Past Surgical History:   Procedure Laterality Date   • TONSILLECTOMY         Problem List:  Patient Active Problem List   Diagnosis   • Major depressive disorder, recurrent, moderate (CMS/HCC)   • Anxiety   • Class 3 severe obesity without serious comorbidity with body mass index (BMI) of 50.0 to 59.9  "in adult (CMS/[facility])       Allergy:   No Known Allergies     Current Medications:   Current Outpatient Medications   Medication Sig Dispense Refill   • buPROPion XL (WELLBUTRIN XL) 150 MG 24 hr tablet Take 150 mg by mouth Daily.     • FLUoxetine (PROzac) 20 MG capsule Take 20 mg by mouth Daily.     • SUMAtriptan (Imitrex) 25 MG tablet Take one tablet at onset of headache. May repeat dose one time in 2 hours if headache not relieved. 10 tablet 0     No current facility-administered medications for this visit.        Objective     VITAL SIGNS:  /76 (BP Location: Right arm, Patient Position: Sitting, Cuff Size: Large Adult)   Pulse 66   Temp 98 °F (36.7 °C) (Temporal)   Ht 177.8 cm (70\")   Wt (!) 159 kg (350 lb)   SpO2 98%   BMI 50.22 kg/m²     Review of Symptoms:    Review of Systems   Constitutional: Negative for chills, fatigue and fever.   HENT: Negative for congestion, ear pain, hearing loss, rhinorrhea, sinus pressure and sore throat.    Eyes: Negative for blurred vision and double vision.   Respiratory: Negative for cough and shortness of breath.    Gastrointestinal: Negative for abdominal pain, constipation, diarrhea, nausea and vomiting.   Genitourinary: Negative for dysuria, hematuria, urgency and urinary incontinence.   Musculoskeletal: Negative for back pain and myalgias.   Skin: Negative for rash.   Neurological: Negative for dizziness and headache.       PHYSICAL EXAM:  Physical Exam  Vitals signs and nursing note reviewed.   Constitutional:       Appearance: Normal appearance. She is obese.   HENT:      Head: Normocephalic and atraumatic.      Right Ear: Tympanic membrane and ear canal normal.      Left Ear: Tympanic membrane and ear canal normal.      Nose: Nose normal.      Mouth/Throat:      Mouth: Mucous membranes are moist.   Eyes:      Extraocular Movements: Extraocular movements intact.      Pupils: Pupils are equal, round, and reactive to light.   Neck:      Musculoskeletal: Normal " range of motion and neck supple.   Cardiovascular:      Rate and Rhythm: Normal rate and regular rhythm.      Heart sounds: No murmur.   Pulmonary:      Effort: Pulmonary effort is normal.      Breath sounds: Normal breath sounds.   Abdominal:      Palpations: Abdomen is soft. There is no mass.      Tenderness: There is no abdominal tenderness. There is no guarding or rebound.   Musculoskeletal: Normal range of motion.   Lymphadenopathy:      Cervical: No cervical adenopathy.      Upper Body:      Right upper body: No supraclavicular or axillary adenopathy.      Left upper body: No supraclavicular or axillary adenopathy.      Lower Body: No right inguinal adenopathy. No left inguinal adenopathy.   Skin:     Findings: No rash.   Neurological:      General: No focal deficit present.      Mental Status: She is alert and oriented to person, place, and time.   Psychiatric:         Mood and Affect: Mood normal.         Behavior: Behavior normal.         Lab Results:   Office Visit on 10/15/2020   Component Date Value Ref Range Status   • Color 10/15/2020 Yellow  Yellow, Straw, Dark Yellow, Kizzy Final   • Clarity, UA 10/15/2020 Clear  Clear Final   • Glucose, UA 10/15/2020 Negative  Negative, 1000 mg/dL (3+) mg/dL Final   • Blood, UA 10/15/2020 Negative  Negative Final   • Protein, POC 10/15/2020 Negative  Negative mg/dL Final   • Leukocytes 10/15/2020 Negative  Negative Final   • Nitrite, UA 10/15/2020 Positive* Negative Final       Assessment/Plan     Problem List Items Addressed This Visit        Digestive    Class 3 severe obesity without serious comorbidity with body mass index (BMI) of 50.0 to 59.9 in adult (CMS/[facility])       Other    Major depressive disorder, recurrent, moderate (CMS/[facility]) - Primary    Anxiety      Other Visit Diagnoses     Routine general medical examination at a health care facility        Relevant Orders    CBC & Differential    Comprehensive Metabolic Panel    Lipid Panel    TSH    POC Urinalysis  Dipstick (Completed)    Hepatitis panel, acute    Chlamydia trachomatis, Neisseria gonorrhoeae, PCR - , Urine, Clean Catch    CBC Auto Differential    Acute nonintractable headache, unspecified headache type        ? Migraine            PHQ-2/PHQ-9 Depression Screening 10/13/2020   Little interest or pleasure in doing things 1   Feeling down, depressed, or hopeless 1   Trouble falling or staying asleep, or sleeping too much 3   Feeling tired or having little energy 1   Poor appetite or overeating 3   Feeling bad about yourself - or that you are a failure or have let yourself or your family down 3   Trouble concentrating on things, such as reading the newspaper or watching television 0   Moving or speaking so slowly that other people could have noticed. Or the opposite - being so fidgety or restless that you have been moving around a lot more than usual 0   Thoughts that you would be better off dead, or of hurting yourself in some way 0   Total Score 12   If you checked off any problems, how difficult have these problems made it for you to do your work, take care of things at home, or get along with other people? Somewhat difficult       Patient's Body mass index is 50.22 kg/m². BMI is above normal parameters. Recommendations include: educational material, exercise counseling and nutrition counseling.   (Normal BMI:  18.5-24.9, OW 25-29.9, Obesity 30 or greater)    Kamryn Gross  reports that she has been smoking. She has a 2.00 pack-year smoking history. She has never used smokeless tobacco.. I have educated her on the risk of diseases from using tobacco products such as cancer, COPD and heart disease.     I advised her to quit and she is not willing to quit.    I spent 5 minutes counseling the patient.           Visit Diagnoses:    ICD-10-CM ICD-9-CM   1. Major depressive disorder, recurrent, moderate (CMS/HCC)  F33.1 296.32   2. Anxiety  F41.9 300.00   3. Routine general medical examination at a [facility]  facility  Z00.00 V70.0   4. Class 3 severe obesity without serious comorbidity with body mass index (BMI) of 50.0 to 59.9 in adult, unspecified obesity type (CMS/[facility])  E66.01 278.01    Z68.43 V85.43   5. Acute nonintractable headache, unspecified headache type  R51.9 784.0         MEDICATION ISSUES:  Discussed medication options and treatment plan of prescribed medication as well as the risks, benefits, and side effects including potential falls, possible impaired driving and metabolic adversities among others. Patient is agreeable to call the office with any worsening of symptoms or onset of side effects. Patient is agreeable to call 911 or go to the nearest ER should he/she begin having SI/HI.     MEDS ORDERED DURING VISIT:  No orders of the defined types were placed in this encounter.      FOLLOW UP:   Return in about 1 month (around 11/15/2020) for Recheck.             This document has been electronically signed by Osmany Mathur MD   October 15, 2020 10:28 EDT    Part of this note may be an electronic transcription/translation of spoken language to printed text using the Dragon Dictation System.   Tissue Cultured Epidermal Autograft Text: The defect edges were debeveled with a #15 scalpel blade.  Given the location of the defect, shape of the defect and the proximity to free margins a tissue cultured epidermal autograft was deemed most appropriate.  The graft was then trimmed to fit the size of the defect.  The graft was then placed in the primary defect and oriented appropriately.

## 2020-10-16 LAB
ALBUMIN SERPL-MCNC: 4.4 G/DL (ref 3.5–5.2)
ALBUMIN/GLOB SERPL: 1.5 G/DL
ALP SERPL-CCNC: 62 U/L (ref 39–117)
ALT SERPL W P-5'-P-CCNC: 20 U/L (ref 1–33)
ANION GAP SERPL CALCULATED.3IONS-SCNC: 10.8 MMOL/L (ref 5–15)
AST SERPL-CCNC: 17 U/L (ref 1–32)
BASOPHILS # BLD AUTO: 0.01 10*3/MM3 (ref 0–0.2)
BASOPHILS NFR BLD AUTO: 0.1 % (ref 0–1.5)
BILIRUB SERPL-MCNC: 0.2 MG/DL (ref 0–1.2)
BUN SERPL-MCNC: 11 MG/DL (ref 6–20)
BUN/CREAT SERPL: 12.1 (ref 7–25)
CALCIUM SPEC-SCNC: 9.2 MG/DL (ref 8.6–10.5)
CHLORIDE SERPL-SCNC: 104 MMOL/L (ref 98–107)
CHOLEST SERPL-MCNC: 146 MG/DL (ref 0–200)
CO2 SERPL-SCNC: 26.2 MMOL/L (ref 22–29)
CREAT SERPL-MCNC: 0.91 MG/DL (ref 0.57–1)
DEPRECATED RDW RBC AUTO: 43.4 FL (ref 37–54)
EOSINOPHIL # BLD AUTO: 0.34 10*3/MM3 (ref 0–0.4)
EOSINOPHIL NFR BLD AUTO: 3.9 % (ref 0.3–6.2)
ERYTHROCYTE [DISTWIDTH] IN BLOOD BY AUTOMATED COUNT: 16 % (ref 12.3–15.4)
GFR SERPL CREATININE-BSD FRML MDRD: 77 ML/MIN/1.73
GLOBULIN UR ELPH-MCNC: 3 GM/DL
GLUCOSE SERPL-MCNC: 73 MG/DL (ref 65–99)
HAV IGM SERPL QL IA: NORMAL
HBV CORE IGM SERPL QL IA: NORMAL
HBV SURFACE AG SERPL QL IA: NORMAL
HCT VFR BLD AUTO: 38.7 % (ref 34–46.6)
HCV AB SER DONR QL: NORMAL
HDLC SERPL-MCNC: 31 MG/DL (ref 40–60)
HGB BLD-MCNC: 12.1 G/DL (ref 12–15.9)
IMM GRANULOCYTES # BLD AUTO: 0.02 10*3/MM3 (ref 0–0.05)
IMM GRANULOCYTES NFR BLD AUTO: 0.2 % (ref 0–0.5)
LDLC SERPL CALC-MCNC: 95 MG/DL (ref 0–100)
LDLC/HDLC SERPL: 3.03 {RATIO}
LYMPHOCYTES # BLD AUTO: 2.39 10*3/MM3 (ref 0.7–3.1)
LYMPHOCYTES NFR BLD AUTO: 27.1 % (ref 19.6–45.3)
MCH RBC QN AUTO: 23.5 PG (ref 26.6–33)
MCHC RBC AUTO-ENTMCNC: 31.3 G/DL (ref 31.5–35.7)
MCV RBC AUTO: 75.1 FL (ref 79–97)
MONOCYTES # BLD AUTO: 0.54 10*3/MM3 (ref 0.1–0.9)
MONOCYTES NFR BLD AUTO: 6.1 % (ref 5–12)
NEUTROPHILS NFR BLD AUTO: 5.53 10*3/MM3 (ref 1.7–7)
NEUTROPHILS NFR BLD AUTO: 62.6 % (ref 42.7–76)
NRBC BLD AUTO-RTO: 0 /100 WBC (ref 0–0.2)
PLATELET # BLD AUTO: 323 10*3/MM3 (ref 140–450)
PMV BLD AUTO: 11.6 FL (ref 6–12)
POTASSIUM SERPL-SCNC: 4.6 MMOL/L (ref 3.5–5.2)
PROT SERPL-MCNC: 7.4 G/DL (ref 6–8.5)
RBC # BLD AUTO: 5.15 10*6/MM3 (ref 3.77–5.28)
SODIUM SERPL-SCNC: 141 MMOL/L (ref 136–145)
TRIGL SERPL-MCNC: 105 MG/DL (ref 0–150)
TSH SERPL DL<=0.05 MIU/L-ACNC: 2.67 UIU/ML (ref 0.27–4.2)
VLDLC SERPL-MCNC: 20 MG/DL (ref 5–40)
WBC # BLD AUTO: 8.83 10*3/MM3 (ref 3.4–10.8)

## 2020-11-06 ENCOUNTER — OFFICE VISIT (OUTPATIENT)
Dept: PSYCHIATRY | Facility: CLINIC | Age: 25
End: 2020-11-06

## 2020-11-06 VITALS
DIASTOLIC BLOOD PRESSURE: 85 MMHG | HEART RATE: 92 BPM | BODY MASS INDEX: 41.95 KG/M2 | WEIGHT: 293 LBS | HEIGHT: 70 IN | SYSTOLIC BLOOD PRESSURE: 149 MMHG

## 2020-11-06 DIAGNOSIS — Z79.899 MEDICATION MANAGEMENT: ICD-10-CM

## 2020-11-06 DIAGNOSIS — E66.01 CLASS 3 SEVERE OBESITY DUE TO EXCESS CALORIES WITH SERIOUS COMORBIDITY AND BODY MASS INDEX (BMI) OF 50.0 TO 59.9 IN ADULT (HCC): ICD-10-CM

## 2020-11-06 DIAGNOSIS — F33.3 SEVERE EPISODE OF RECURRENT MAJOR DEPRESSIVE DISORDER, WITH PSYCHOTIC FEATURES (HCC): Primary | ICD-10-CM

## 2020-11-06 DIAGNOSIS — G47.00 INSOMNIA, UNSPECIFIED TYPE: ICD-10-CM

## 2020-11-06 DIAGNOSIS — F41.1 GENERALIZED ANXIETY DISORDER: ICD-10-CM

## 2020-11-06 DIAGNOSIS — F39 MOOD DISORDER (HCC): ICD-10-CM

## 2020-11-06 LAB
AMPHETAMINE CUT-OFF: ABNORMAL
BENZODIAZIPINE CUT-OFF: ABNORMAL
BUPRENORPHINE CUT-OFF: ABNORMAL
COCAINE CUT-OFF: ABNORMAL
EXTERNAL AMPHETAMINE SCREEN URINE: NEGATIVE
EXTERNAL BENZODIAZEPINE SCREEN URINE: NEGATIVE
EXTERNAL BUPRENORPHINE SCREEN URINE: NEGATIVE
EXTERNAL COCAINE SCREEN URINE: NEGATIVE
EXTERNAL MDMA: NEGATIVE
EXTERNAL METHADONE SCREEN URINE: NEGATIVE
EXTERNAL METHAMPHETAMINE SCREEN URINE: NEGATIVE
EXTERNAL OPIATES SCREEN URINE: NEGATIVE
EXTERNAL OXYCODONE SCREEN URINE: NEGATIVE
EXTERNAL THC SCREEN URINE: POSITIVE
MDMA CUT-OFF: ABNORMAL
METHADONE CUT-OFF: ABNORMAL
METHAMPHETAMINE CUT-OFF: ABNORMAL
OPIATES CUT-OFF: ABNORMAL
OXYCODONE CUT-OFF: ABNORMAL
THC CUT-OFF: ABNORMAL

## 2020-11-06 PROCEDURE — 90792 PSYCH DIAG EVAL W/MED SRVCS: CPT | Performed by: NURSE PRACTITIONER

## 2020-11-06 RX ORDER — ARIPIPRAZOLE 2 MG/1
2 TABLET ORAL DAILY
Qty: 30 TABLET | Refills: 0 | Status: SHIPPED | OUTPATIENT
Start: 2020-11-06 | End: 2020-12-29 | Stop reason: SDUPTHER

## 2020-11-06 RX ORDER — BUPROPION HYDROCHLORIDE 300 MG/1
300 TABLET ORAL DAILY
Qty: 30 TABLET | Refills: 0 | Status: SHIPPED | OUTPATIENT
Start: 2020-11-06 | End: 2020-12-29 | Stop reason: SDUPTHER

## 2020-11-06 RX ORDER — FLUOXETINE HYDROCHLORIDE 40 MG/1
40 CAPSULE ORAL DAILY
Qty: 30 CAPSULE | Refills: 0 | Status: SHIPPED | OUTPATIENT
Start: 2020-11-06 | End: 2020-12-29 | Stop reason: SDUPTHER

## 2020-11-06 RX ORDER — PRAZOSIN HYDROCHLORIDE 1 MG/1
1 CAPSULE ORAL NIGHTLY
Qty: 30 CAPSULE | Refills: 0 | Status: SHIPPED | OUTPATIENT
Start: 2020-11-06 | End: 2020-12-29 | Stop reason: SDUPTHER
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
## 2020-11-16 ENCOUNTER — TELEPHONE (OUTPATIENT)
Dept: FAMILY MEDICINE CLINIC | Facility: CLINIC | Age: 25
End: 2020-11-16

## 2020-11-19 ENCOUNTER — OFFICE VISIT (OUTPATIENT)
Dept: PSYCHIATRY | Facility: CLINIC | Age: 25
End: 2020-11-19

## 2020-11-19 VITALS — BODY MASS INDEX: 50.22 KG/M2 | WEIGHT: 293 LBS

## 2020-11-19 DIAGNOSIS — F33.2 SEVERE EPISODE OF RECURRENT MAJOR DEPRESSIVE DISORDER, WITHOUT PSYCHOTIC FEATURES (HCC): Primary | ICD-10-CM

## 2020-11-19 DIAGNOSIS — G47.00 INSOMNIA, UNSPECIFIED TYPE: ICD-10-CM

## 2020-11-19 DIAGNOSIS — F41.1 GENERALIZED ANXIETY DISORDER: ICD-10-CM

## 2020-11-19 DIAGNOSIS — F39 MOOD DISORDER (HCC): ICD-10-CM

## 2020-11-19 PROCEDURE — 90791 PSYCH DIAGNOSTIC EVALUATION: CPT | Performed by: COUNSELOR
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
## 2020-11-23 NOTE — TREATMENT PLAN
Multi-Disciplinary Problems (from Behavioral Health Treatment Plan)    Active Problems     Problem: Ineffective Coping  Start Date: 11/19/20    Problem Details: The patient self-scales this problem as a 8 with 10 being the worst.        Goal Priority Start Date Expected End Date End Date    Patient will demonstrate the ability to initiate new constructive life skills consistently. -- 11/19/20 -- --    Goal Details: Progress toward goal:  Not appropriate to rate progress toward goal since this is the initial treatment plan.          Goal Intervention Frequency Start Date End Date    Assist patient in identifying healthy coping behaviors. Weekly 11/19/20 --    Intervention Details: Duration of treatment until until remission of symptoms.                           I have discussed and reviewed this treatment plan with the patient.  It has been printed for signatures.

## 2020-11-23 NOTE — PLAN OF CARE
Problem: Ineffective Coping  Goal: Patient will demonstrate the ability to initiate new constructive life skills consistently.  Outcome: Ongoing, Progressing

## 2020-12-01 ENCOUNTER — TELEPHONE (OUTPATIENT)
Dept: FAMILY MEDICINE CLINIC | Facility: CLINIC | Age: 25
End: 2020-12-01

## 2020-12-03 ENCOUNTER — OFFICE VISIT (OUTPATIENT)
Dept: PSYCHIATRY | Facility: CLINIC | Age: 25
End: 2020-12-03

## 2020-12-03 DIAGNOSIS — F60.3 BORDERLINE PERSONALITY DISORDER (HCC): ICD-10-CM

## 2020-12-03 DIAGNOSIS — G47.00 INSOMNIA, UNSPECIFIED TYPE: ICD-10-CM

## 2020-12-03 DIAGNOSIS — F39 MOOD DISORDER (HCC): ICD-10-CM

## 2020-12-03 DIAGNOSIS — F33.1 MAJOR DEPRESSIVE DISORDER, RECURRENT EPISODE, MODERATE (HCC): ICD-10-CM

## 2020-12-03 DIAGNOSIS — F41.1 GENERALIZED ANXIETY DISORDER: Primary | ICD-10-CM

## 2020-12-03 PROCEDURE — 90785 PSYTX COMPLEX INTERACTIVE: CPT | Performed by: COUNSELOR

## 2020-12-03 PROCEDURE — 90837 PSYTX W PT 60 MINUTES: CPT | Performed by: COUNSELOR
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
## 2020-12-09 DIAGNOSIS — F33.3 SEVERE EPISODE OF RECURRENT MAJOR DEPRESSIVE DISORDER, WITH PSYCHOTIC FEATURES (HCC): ICD-10-CM

## 2020-12-09 DIAGNOSIS — G47.00 INSOMNIA, UNSPECIFIED TYPE: ICD-10-CM

## 2020-12-09 DIAGNOSIS — F41.1 GENERALIZED ANXIETY DISORDER: ICD-10-CM

## 2020-12-09 RX ORDER — PRAZOSIN HYDROCHLORIDE 1 MG/1
1 CAPSULE ORAL NIGHTLY
Qty: 30 CAPSULE | Refills: 0 | OUTPATIENT
Start: 2020-12-09

## 2020-12-09 RX ORDER — FLUOXETINE HYDROCHLORIDE 40 MG/1
40 CAPSULE ORAL DAILY
Qty: 30 CAPSULE | Refills: 0 | OUTPATIENT
Start: 2020-12-09

## 2020-12-09 RX ORDER — ARIPIPRAZOLE 2 MG/1
2 TABLET ORAL DAILY
Qty: 30 TABLET | Refills: 0 | OUTPATIENT
Start: 2020-12-09

## 2020-12-09 RX ORDER — BUPROPION HYDROCHLORIDE 300 MG/1
300 TABLET ORAL DAILY
Qty: 30 TABLET | Refills: 0 | OUTPATIENT
Start: 2020-12-09

## 2020-12-11 ENCOUNTER — OFFICE VISIT (OUTPATIENT)
Dept: FAMILY MEDICINE CLINIC | Facility: CLINIC | Age: 25
End: 2020-12-11

## 2020-12-11 VITALS
BODY MASS INDEX: 41.95 KG/M2 | RESPIRATION RATE: 20 BRPM | WEIGHT: 293 LBS | TEMPERATURE: 97 F | HEIGHT: 70 IN | SYSTOLIC BLOOD PRESSURE: 141 MMHG | DIASTOLIC BLOOD PRESSURE: 75 MMHG | OXYGEN SATURATION: 97 % | HEART RATE: 72 BPM

## 2020-12-11 DIAGNOSIS — E66.01 CLASS 3 SEVERE OBESITY WITHOUT SERIOUS COMORBIDITY WITH BODY MASS INDEX (BMI) OF 50.0 TO 59.9 IN ADULT, UNSPECIFIED OBESITY TYPE (HCC): ICD-10-CM

## 2020-12-11 DIAGNOSIS — F33.1 MAJOR DEPRESSIVE DISORDER, RECURRENT, MODERATE (HCC): ICD-10-CM

## 2020-12-11 DIAGNOSIS — G43.909 MIGRAINE WITHOUT STATUS MIGRAINOSUS, NOT INTRACTABLE, UNSPECIFIED MIGRAINE TYPE: Primary | ICD-10-CM

## 2020-12-11 DIAGNOSIS — F41.9 ANXIETY: ICD-10-CM

## 2020-12-11 PROCEDURE — 96372 THER/PROPH/DIAG INJ SC/IM: CPT | Performed by: FAMILY MEDICINE

## 2020-12-11 PROCEDURE — 90686 IIV4 VACC NO PRSV 0.5 ML IM: CPT | Performed by: FAMILY MEDICINE

## 2020-12-11 PROCEDURE — 99213 OFFICE O/P EST LOW 20 MIN: CPT | Performed by: FAMILY MEDICINE

## 2020-12-11 PROCEDURE — 90471 IMMUNIZATION ADMIN: CPT | Performed by: FAMILY MEDICINE

## 2020-12-11 RX ORDER — SUMATRIPTAN 25 MG/1
TABLET, FILM COATED ORAL
Qty: 20 TABLET | Refills: 0 | Status: SHIPPED | OUTPATIENT
Start: 2020-12-11 | End: 2021-03-29 | Stop reason: SDUPTHER

## 2020-12-11 RX ORDER — KETOROLAC TROMETHAMINE 30 MG/ML
60 INJECTION, SOLUTION INTRAMUSCULAR; INTRAVENOUS ONCE
Status: COMPLETED | OUTPATIENT
Start: 2020-12-11 | End: 2020-12-11

## 2020-12-11 RX ADMIN — KETOROLAC TROMETHAMINE 60 MG: 30 INJECTION, SOLUTION INTRAMUSCULAR; INTRAVENOUS at 10:31

## 2020-12-11 NOTE — PROGRESS NOTES
"Ninoska Gross is a 25 y.o. female.     The patient is here for follow-up on her headaches.  The patient states that the Imitrex helps whenever she takes it before the headaches gets worse.  Patient however had a headache when she woke up this morning and she is out of her Imitrex.  Patient has a history of depression, anxiety and is regularly following up with a psychiatrist and a psychologist.  States she was recently just started on Abilify and some of her medications were increased or adjusted.  She has gained more weight since her last visit.  The patient has never had a Pap smear and we were supposed to do a Pap smear but she is on her menstrual period right now.  DepressionPatient is not experiencing: shortness of breath.         The following portions of the patient's history were reviewed and updated as appropriate: allergies, current medications, past family history, past medical history, past social history, past surgical history, and problem list.    Review of Systems   Constitutional: Negative for chills, fatigue and fever.   HENT: Negative for congestion, ear pain, hearing loss, rhinorrhea, sinus pressure and sore throat.    Eyes: Negative for blurred vision and double vision.   Respiratory: Negative for cough and shortness of breath.    Gastrointestinal: Negative for abdominal pain, constipation, diarrhea, nausea and vomiting.   Genitourinary: Negative for dysuria, hematuria, urgency and urinary incontinence.   Musculoskeletal: Negative for back pain and myalgias.   Skin: Negative for rash.   Neurological: Positive for headache. Negative for dizziness.       /75 (BP Location: Right arm, Patient Position: Sitting, Cuff Size: Large Adult)   Pulse 72   Temp 97 °F (36.1 °C) (Temporal)   Resp 20   Ht 177.8 cm (70\")   Wt (!) 168 kg (370 lb)   SpO2 97%   BMI 53.09 kg/m²    Body mass index is 53.09 kg/m².     Objective   Physical Exam  Vitals signs and nursing note reviewed. "   Constitutional:       General: She is not in acute distress.     Appearance: Normal appearance. She is obese.   HENT:      Head: Normocephalic and atraumatic.      Right Ear: Tympanic membrane and ear canal normal.      Left Ear: Tympanic membrane and ear canal normal.      Nose: Nose normal.      Mouth/Throat:      Mouth: Mucous membranes are moist.   Eyes:      Extraocular Movements: Extraocular movements intact.      Pupils: Pupils are equal, round, and reactive to light.      Visual Fields: Right eye visual fields normal and left eye visual fields normal.   Neck:      Musculoskeletal: Normal range of motion and neck supple.   Cardiovascular:      Rate and Rhythm: Normal rate and regular rhythm.      Heart sounds: No murmur.   Pulmonary:      Effort: Pulmonary effort is normal.      Breath sounds: Normal breath sounds.   Abdominal:      General: Abdomen is protuberant. Bowel sounds are normal.      Palpations: Abdomen is soft. There is no mass.      Tenderness: There is no abdominal tenderness. There is no guarding or rebound.   Musculoskeletal: Normal range of motion.   Skin:     Findings: No rash.   Neurological:      General: No focal deficit present.      Mental Status: She is alert and oriented to person, place, and time.      Cranial Nerves: Cranial nerves are intact.      Sensory: Sensation is intact.      Motor: Motor function is intact.      Coordination: Coordination is intact.      Gait: Gait is intact.   Psychiatric:         Mood and Affect: Mood normal.         Behavior: Behavior normal.           Assessment/Plan   Problems Addressed this Visit        Digestive    Class 3 severe obesity without serious comorbidity with body mass index (BMI) of 50.0 to 59.9 in adult (CMS/HCC)    Relevant Orders    Ambulatory Referral to Nutrition Services       Other    Major depressive disorder, recurrent, moderate (CMS/[facility])    Anxiety      Other Visit Diagnoses     Migraine without status migrainosus, not  intractable, unspecified migraine type    -  Primary    Relevant Medications    SUMAtriptan (Imitrex) 25 MG tablet    ketorolac (TORADOL) injection 60 mg (Completed)      Diagnoses       Codes Comments    Migraine without status migrainosus, not intractable, unspecified migraine type    -  Primary ICD-10-CM: G43.909  ICD-9-CM: 346.90     Class 3 severe obesity without serious comorbidity with body mass index (BMI) of 50.0 to 59.9 in adult, unspecified obesity type (CMS/HCC)     ICD-10-CM: E66.01, Z68.43  ICD-9-CM: 278.01, V85.43     Major depressive disorder, recurrent, moderate (CMS/HCC)     ICD-10-CM: F33.1  ICD-9-CM: 296.32     Anxiety     ICD-10-CM: F41.9  ICD-9-CM: 300.00         Will refill her Imitrex.  Advised to continue all other current medications.  We will do her Pap smear 1 week after her menstruation.  Patient also referred to a dietitian or nutritional services to help her lose some weight.          Return in about 1 week (around 12/18/2020) for pap smear.         This document has been electronically signed by Osmany Mathur MD   December 11, 2020 12:44 EST    Part of this note may be an electronic transcription/translation of spoken language to printed text using the Dragon Dictation System.

## 2020-12-11 NOTE — PATIENT INSTRUCTIONS
Migraine Headache  A migraine headache is a very strong throbbing pain on one side or both sides of your head. This type of headache can also cause other symptoms. It can last from 4 hours to 3 days. Talk with your doctor about what things may bring on (trigger) this condition.  What are the causes?  The exact cause of this condition is not known. This condition may be triggered or caused by:  · Drinking alcohol.  · Smoking.  · Taking medicines, such as:  ? Medicine used to treat chest pain (nitroglycerin).  ? Birth control pills.  ? Estrogen.  ? Some blood pressure medicines.  · Eating or drinking certain products.  · Doing physical activity.  Other things that may trigger a migraine headache include:  · Having a menstrual period.  · Pregnancy.  · Hunger.  · Stress.  · Not getting enough sleep or getting too much sleep.  · Weather changes.  · Tiredness (fatigue).  What increases the risk?  · Being 25-55 years old.  · Being female.  · Having a family history of migraine headaches.  · Being .  · Having depression or anxiety.  · Being very overweight.  What are the signs or symptoms?  · A throbbing pain. This pain may:  ? Happen in any area of the head, such as on one side or both sides.  ? Make it hard to do daily activities.  ? Get worse with physical activity.  ? Get worse around bright lights or loud noises.  · Other symptoms may include:  ? Feeling sick to your stomach (nauseous).  ? Vomiting.  ? Dizziness.  ? Being sensitive to bright lights, loud noises, or smells.  · Before you get a migraine headache, you may get warning signs (an aura). An aura may include:  ? Seeing flashing lights or having blind spots.  ? Seeing bright spots, halos, or zigzag lines.  ? Having tunnel vision or blurred vision.  ? Having numbness or a tingling feeling.  ? Having trouble talking.  ? Having weak muscles.  · Some people have symptoms after a migraine headache (postdromal phase), such as:  ? Tiredness.  ? Trouble  thinking (concentrating).  How is this treated?  · Taking medicines that:  ? Relieve pain.  ? Relieve the feeling of being sick to your stomach.  ? Prevent migraine headaches.  · Treatment may also include:  ? Having acupuncture.  ? Avoiding foods that bring on migraine headaches.  ? Learning ways to control your body functions (biofeedback).  ? Therapy to help you know and deal with negative thoughts (cognitive behavioral therapy).  Follow these instructions at home:  Medicines  · Take over-the-counter and prescription medicines only as told by your doctor.  · Ask your doctor if the medicine prescribed to you:  ? Requires you to avoid driving or using heavy machinery.  ? Can cause trouble pooping (constipation). You may need to take these steps to prevent or treat trouble pooping:  § Drink enough fluid to keep your pee (urine) pale yellow.  § Take over-the-counter or prescription medicines.  § Eat foods that are high in fiber. These include beans, whole grains, and fresh fruits and vegetables.  § Limit foods that are high in fat and sugar. These include fried or sweet foods.  Lifestyle  · Do not drink alcohol.  · Do not use any products that contain nicotine or tobacco, such as cigarettes, e-cigarettes, and chewing tobacco. If you need help quitting, ask your doctor.  · Get at least 8 hours of sleep every night.  · Limit and deal with stress.  General instructions         · Keep a journal to find out what may bring on your migraine headaches. For example, write down:  ? What you eat and drink.  ? How much sleep you get.  ? Any change in what you eat or drink.  ? Any change in your medicines.  · If you have a migraine headache:  ? Avoid things that make your symptoms worse, such as bright lights.  ? It may help to lie down in a dark, quiet room.  ? Do not drive or use heavy machinery.  ? Ask your doctor what activities are safe for you.  · Keep all follow-up visits as told by your doctor. This is important.  Contact  a doctor if:  · You get a migraine headache that is different or worse than others you have had.  · You have more than 15 headache days in one month.  Get help right away if:  · Your migraine headache gets very bad.  · Your migraine headache lasts longer than 72 hours.  · You have a fever.  · You have a stiff neck.  · You have trouble seeing.  · Your muscles feel weak or like you cannot control them.  · You start to lose your balance a lot.  · You start to have trouble walking.  · You pass out (faint).  · You have a seizure.  Summary  · A migraine headache is a very strong throbbing pain on one side or both sides of your head. These headaches can also cause other symptoms.  · This condition may be treated with medicines and changes to your lifestyle.  · Keep a journal to find out what may bring on your migraine headaches.  · Contact a doctor if you get a migraine headache that is different or worse than others you have had.  · Contact your doctor if you have more than 15 headache days in a month.  This information is not intended to replace advice given to you by your health care provider. Make sure you discuss any questions you have with your health care provider.  Document Revised: 04/10/2020 Document Reviewed: 01/30/2020  [email or web address] Patient Education © 2020 [employer]    Obesity, Adult  Obesity is having too much body fat. Being obese means that your weight is more than what is healthy for you.  BMI is a number that explains how much body fat you have. If you have a BMI of 30 or more, you are obese. Obesity is often caused by eating or drinking more calories than your body uses. Changing your lifestyle can help you lose weight.  Obesity can cause serious health problems, such as:  · Stroke.  · Coronary artery disease (CAD).  · Type 2 diabetes.  · Some types of cancer, including cancers of the colon, breast, uterus, and gallbladder.  · Osteoarthritis.  · High blood pressure (hypertension).  · High  cholesterol.  · Sleep apnea.  · Gallbladder stones.  · Infertility problems.  What are the causes?  · Eating meals each day that are high in calories, sugar, and fat.  · Being born with genes that may make you more likely to become obese.  · Having a medical condition that causes obesity.  · Taking certain medicines.  · Sitting a lot (having a sedentary lifestyle).  · Not getting enough sleep.  · Drinking a lot of drinks that have sugar in them.  What increases the risk?  · Having a family history of obesity.  · Being an  woman.  · Being a  man.  · Living in an area with limited access to:  ? [place], recreation centers, or sidewalks.  ? Healthy food choices, such as grocery stores and farmers' markets.  What are the signs or symptoms?  The main sign is having too much body fat.  How is this treated?  · Treatment for this condition often includes changing your lifestyle. Treatment may include:  ? Changing your diet. This may include making a healthy meal plan.  ? Exercise. This may include activity that causes your heart to beat faster (aerobic exercise) and strength training. Work with your doctor to design a program that works for you.  ? Medicine to help you lose weight. This may be used if you are not able to lose 1 pound a week after 6 weeks of healthy eating and more exercise.  ? Treating conditions that cause the obesity.  ? Surgery. Options may include gastric banding and gastric bypass. This may be done if:  § Other treatments have not helped to improve your condition.  § You have a BMI of 40 or higher.  § You have life-threatening health problems related to obesity.  Follow these instructions at home:  Eating and drinking    · Follow advice from your doctor about what to eat and drink. Your doctor may tell you to:  ? Limit fast food, sweets, and processed snack foods.  ? Choose low-fat options. For example, choose low-fat milk instead of whole milk.  ? Eat 5 or more servings of fruits  or vegetables each day.  ? Eat at home more often. This gives you more control over what you eat.  ? Choose healthy foods when you eat out.  ? Learn to read food labels. This will help you learn how much food is in 1 serving.  ? Keep low-fat snacks available.  ? Avoid drinks that have a lot of sugar in them. These include soda, fruit juice, iced tea with sugar, and flavored milk.  · Drink enough water to keep your pee (urine) pale yellow.  · Do not go on fad diets.  Physical activity  · Exercise often, as told by your doctor. Most adults should get up to 150 minutes of moderate-intensity exercise every week.Ask your doctor:  ? What types of exercise are safe for you.  ? How often you should exercise.  · Warm up and stretch before being active.  · Do slow stretching after being active (cool down).  · Rest between times of being active.  Lifestyle  · Work with your doctor and a food expert (dietitian) to set a weight-loss goal that is best for you.  · Limit your screen time.  · Find ways to reward yourself that do not involve food.  · Do not drink alcohol if:  ? Your doctor tells you not to drink.  ? You are pregnant, may be pregnant, or are planning to become pregnant.  · If you drink alcohol:  ? Limit how much you use to:  § 0-1 drink a day for women.  § 0-2 drinks a day for men.  ? Be aware of how much alcohol is in your drink. In the [place] one drink equals one 12 oz bottle of beer (355 mL), one 5 oz glass of wine (148 mL), or one 1½ oz glass of hard liquor (44 mL).  General instructions  · Keep a weight-loss journal. This can help you keep track of:  ? The food that you eat.  ? How much exercise you get.  · Take over-the-counter and prescription medicines only as told by your doctor.  · Take vitamins and supplements only as told by your doctor.  · Think about joining a support group.  · Keep all follow-up visits as told by your doctor. This is important.  Contact a doctor if:  · You cannot meet your weight loss  goal after you have changed your diet and lifestyle for 6 weeks.  Get help right away if you:  · Are having trouble breathing.  · Are having thoughts of harming yourself.  Summary  · Obesity is having too much body fat.  · Being obese means that your weight is more than what is healthy for you.  · Work with your doctor to set a weight-loss goal.  · Get regular exercise as told by your doctor.  This information is not intended to replace advice given to you by your health care provider. Make sure you discuss any questions you have with your health care provider.  Document Revised: 08/22/2019 Document Reviewed: 08/22/2019  [employer] Patient Education © 2020 [employer]

## 2020-12-18 ENCOUNTER — OFFICE VISIT (OUTPATIENT)
Dept: FAMILY MEDICINE CLINIC | Facility: CLINIC | Age: 25
End: 2020-12-18

## 2020-12-18 VITALS
WEIGHT: 293 LBS | HEIGHT: 70 IN | OXYGEN SATURATION: 99 % | SYSTOLIC BLOOD PRESSURE: 135 MMHG | TEMPERATURE: 97.5 F | DIASTOLIC BLOOD PRESSURE: 86 MMHG | RESPIRATION RATE: 20 BRPM | BODY MASS INDEX: 41.95 KG/M2 | HEART RATE: 75 BPM

## 2020-12-18 DIAGNOSIS — Z12.4 ENCOUNTER FOR PAPANICOLAOU SMEAR OF CERVIX: ICD-10-CM

## 2020-12-18 DIAGNOSIS — G43.909 MIGRAINE WITHOUT STATUS MIGRAINOSUS, NOT INTRACTABLE, UNSPECIFIED MIGRAINE TYPE: Primary | ICD-10-CM

## 2020-12-18 PROCEDURE — 99213 OFFICE O/P EST LOW 20 MIN: CPT | Performed by: FAMILY MEDICINE

## 2020-12-18 NOTE — PROGRESS NOTES
"Ninoska Gross is a 25 y.o. female.     The patient is here for follow-up on her headaches.  The patient stated that she had an episode this morning but will be by the Imitrex.  The patient is also here to get her Pap smear done.  She states that this is her first Pap smear.  We also referred the patient to a dietitian or nutrition services to help her with her weight.    Gynecologic Exam         The following portions of the patient's history were reviewed and updated as appropriate: allergies, current medications, past family history, past medical history, past social history, past surgical history, and problem list.    Review of Systems    /86 (BP Location: Left arm, Patient Position: Sitting, Cuff Size: Large Adult)   Pulse 75   Temp 97.5 °F (36.4 °C) (Temporal)   Resp 20   Ht 177.8 cm (70\")   Wt (!) 168 kg (370 lb 3.2 oz)   SpO2 99%   BMI 53.12 kg/m²    Body mass index is 53.12 kg/m².     Objective   Physical Exam  Vitals signs and nursing note reviewed.   Constitutional:       General: She is not in acute distress.     Appearance: Normal appearance. She is obese.   HENT:      Head: Normocephalic and atraumatic.      Right Ear: Tympanic membrane and ear canal normal.      Left Ear: Tympanic membrane and ear canal normal.      Nose: Nose normal.      Mouth/Throat:      Mouth: Mucous membranes are moist.   Eyes:      Extraocular Movements: Extraocular movements intact.      Pupils: Pupils are equal, round, and reactive to light.   Neck:      Musculoskeletal: Normal range of motion and neck supple.   Cardiovascular:      Rate and Rhythm: Normal rate and regular rhythm.      Heart sounds: No murmur.   Pulmonary:      Effort: Pulmonary effort is normal.      Breath sounds: Normal breath sounds.   Abdominal:      General: Abdomen is protuberant.      Palpations: Abdomen is soft. There is no mass.      Tenderness: There is no abdominal tenderness. There is no guarding or rebound. "   Genitourinary:     General: Normal vulva.      Exam position: Lithotomy position.      Pubic Area: No rash.       Labia:         Right: No rash, tenderness or lesion.         Left: No rash, tenderness or lesion.       Urethra: No prolapse, urethral pain, urethral swelling or urethral lesion.      Vagina: No foreign body. Vaginal discharge (Mild, clear to white vaginal discharge not foul-smelling) present. No erythema, tenderness or bleeding.      Cervix: No discharge, lesion, erythema or cervical bleeding.      Uterus: Normal.       Adnexa: Right adnexa normal and left adnexa normal.        Right: No mass or tenderness.          Left: No mass or tenderness.        Rectum: Normal.   Musculoskeletal: Normal range of motion.   Skin:     Findings: No rash.   Neurological:      General: No focal deficit present.      Mental Status: She is alert and oriented to person, place, and time.   Psychiatric:         Mood and Affect: Mood normal.         Behavior: Behavior normal.           Assessment/Plan   Problems Addressed this Visit     None      Visit Diagnoses     Migraine without status migrainosus, not intractable, unspecified migraine type    -  Primary    Encounter for Papanicolaou smear of cervix        Relevant Orders    Liquid-based Pap Smear, Screening      Diagnoses       Codes Comments    Migraine without status migrainosus, not intractable, unspecified migraine type    -  Primary ICD-10-CM: G43.909  ICD-9-CM: 346.90     Encounter for Papanicolaou smear of cervix     ICD-10-CM: Z12.4  ICD-9-CM: V76.2         Will follow up on the referral to the nutritional services.  Continue current medication for migraine headaches.         Return in about 3 months (around 3/18/2021), or if symptoms worsen or fail to improve.         This document has been electronically signed by Osmany Mathur MD   December 18, 2020 17:05 EST    Part of this note may be an electronic transcription/translation of spoken language to  printed text using the Dragon Dictation System.

## 2020-12-18 NOTE — PATIENT INSTRUCTIONS
Migraine Headache  A migraine headache is a very strong throbbing pain on one side or both sides of your head. This type of headache can also cause other symptoms. It can last from 4 hours to 3 days. Talk with your doctor about what things may bring on (trigger) this condition.  What are the causes?  The exact cause of this condition is not known. This condition may be triggered or caused by:  · Drinking alcohol.  · Smoking.  · Taking medicines, such as:  ? Medicine used to treat chest pain (nitroglycerin).  ? Birth control pills.  ? Estrogen.  ? Some blood pressure medicines.  · Eating or drinking certain products.  · Doing physical activity.  Other things that may trigger a migraine headache include:  · Having a menstrual period.  · Pregnancy.  · Hunger.  · Stress.  · Not getting enough sleep or getting too much sleep.  · Weather changes.  · Tiredness (fatigue).  What increases the risk?  · Being 25-55 years old.  · Being female.  · Having a family history of migraine headaches.  · Being .  · Having depression or anxiety.  · Being very overweight.  What are the signs or symptoms?  · A throbbing pain. This pain may:  ? Happen in any area of the head, such as on one side or both sides.  ? Make it hard to do daily activities.  ? Get worse with physical activity.  ? Get worse around bright lights or loud noises.  · Other symptoms may include:  ? Feeling sick to your stomach (nauseous).  ? Vomiting.  ? Dizziness.  ? Being sensitive to bright lights, loud noises, or smells.  · Before you get a migraine headache, you may get warning signs (an aura). An aura may include:  ? Seeing flashing lights or having blind spots.  ? Seeing bright spots, halos, or zigzag lines.  ? Having tunnel vision or blurred vision.  ? Having numbness or a tingling feeling.  ? Having trouble talking.  ? Having weak muscles.  · Some people have symptoms after a migraine headache (postdromal phase), such as:  ? Tiredness.  ? Trouble  thinking (concentrating).  How is this treated?  · Taking medicines that:  ? Relieve pain.  ? Relieve the feeling of being sick to your stomach.  ? Prevent migraine headaches.  · Treatment may also include:  ? Having acupuncture.  ? Avoiding foods that bring on migraine headaches.  ? Learning ways to control your body functions (biofeedback).  ? Therapy to help you know and deal with negative thoughts (cognitive behavioral therapy).  Follow these instructions at home:  Medicines  · Take over-the-counter and prescription medicines only as told by your doctor.  · Ask your doctor if the medicine prescribed to you:  ? Requires you to avoid driving or using heavy machinery.  ? Can cause trouble pooping (constipation). You may need to take these steps to prevent or treat trouble pooping:  § Drink enough fluid to keep your pee (urine) pale yellow.  § Take over-the-counter or prescription medicines.  § Eat foods that are high in fiber. These include beans, whole grains, and fresh fruits and vegetables.  § Limit foods that are high in fat and sugar. These include fried or sweet foods.  Lifestyle  · Do not drink alcohol.  · Do not use any products that contain nicotine or tobacco, such as cigarettes, e-cigarettes, and chewing tobacco. If you need help quitting, ask your doctor.  · Get at least 8 hours of sleep every night.  · Limit and deal with stress.  General instructions         · Keep a journal to find out what may bring on your migraine headaches. For example, write down:  ? What you eat and drink.  ? How much sleep you get.  ? Any change in what you eat or drink.  ? Any change in your medicines.  · If you have a migraine headache:  ? Avoid things that make your symptoms worse, such as bright lights.  ? It may help to lie down in a dark, quiet room.  ? Do not drive or use heavy machinery.  ? Ask your doctor what activities are safe for you.  · Keep all follow-up visits as told by your doctor. This is important.  Contact  a doctor if:  · You get a migraine headache that is different or worse than others you have had.  · You have more than 15 headache days in one month.  Get help right away if:  · Your migraine headache gets very bad.  · Your migraine headache lasts longer than 72 hours.  · You have a fever.  · You have a stiff neck.  · You have trouble seeing.  · Your muscles feel weak or like you cannot control them.  · You start to lose your balance a lot.  · You start to have trouble walking.  · You pass out (faint).  · You have a seizure.  Summary  · A migraine headache is a very strong throbbing pain on one side or both sides of your head. These headaches can also cause other symptoms.  · This condition may be treated with medicines and changes to your lifestyle.  · Keep a journal to find out what may bring on your migraine headaches.  · Contact a doctor if you get a migraine headache that is different or worse than others you have had.  · Contact your doctor if you have more than 15 headache days in a month.  This information is not intended to replace advice given to you by your health care provider. Make sure you discuss any questions you have with your health care provider.  Document Revised: 04/10/2020 Document Reviewed: 01/30/2020  [employer]vier Patient Education © 2020 [employer]    Migraine Headache  A migraine headache is a very strong throbbing pain on one side or both sides of your head. This type of headache can also cause other symptoms. It can last from 4 hours to 3 days. Talk with your doctor about what things may bring on (trigger) this condition.  What are the causes?  The exact cause of this condition is not known. This condition may be triggered or caused by:  · Drinking alcohol.  · Smoking.  · Taking medicines, such as:  ? Medicine used to treat chest pain (nitroglycerin).  ? Birth control pills.  ? Estrogen.  ? Some blood pressure medicines.  · Eating or drinking certain products.  · Doing physical  activity.  Other things that may trigger a migraine headache include:  · Having a menstrual period.  · Pregnancy.  · Hunger.  · Stress.  · Not getting enough sleep or getting too much sleep.  · Weather changes.  · Tiredness (fatigue).  What increases the risk?  · Being 25-55 years old.  · Being female.  · Having a family history of migraine headaches.  · Being .  · Having depression or anxiety.  · Being very overweight.  What are the signs or symptoms?  · A throbbing pain. This pain may:  ? Happen in any area of the head, such as on one side or both sides.  ? Make it hard to do daily activities.  ? Get worse with physical activity.  ? Get worse around bright lights or loud noises.  · Other symptoms may include:  ? Feeling sick to your stomach (nauseous).  ? Vomiting.  ? Dizziness.  ? Being sensitive to bright lights, loud noises, or smells.  · Before you get a migraine headache, you may get warning signs (an aura). An aura may include:  ? Seeing flashing lights or having blind spots.  ? Seeing bright spots, halos, or zigzag lines.  ? Having tunnel vision or blurred vision.  ? Having numbness or a tingling feeling.  ? Having trouble talking.  ? Having weak muscles.  · Some people have symptoms after a migraine headache (postdromal phase), such as:  ? Tiredness.  ? Trouble thinking (concentrating).  How is this treated?  · Taking medicines that:  ? Relieve pain.  ? Relieve the feeling of being sick to your stomach.  ? Prevent migraine headaches.  · Treatment may also include:  ? Having acupuncture.  ? Avoiding foods that bring on migraine headaches.  ? Learning ways to control your body functions (biofeedback).  ? Therapy to help you know and deal with negative thoughts (cognitive behavioral therapy).  Follow these instructions at home:  Medicines  · Take over-the-counter and prescription medicines only as told by your doctor.  · Ask your doctor if the medicine prescribed to you:  ? Requires you to avoid  driving or using heavy machinery.  ? Can cause trouble pooping (constipation). You may need to take these steps to prevent or treat trouble pooping:  § Drink enough fluid to keep your pee (urine) pale yellow.  § Take over-the-counter or prescription medicines.  § Eat foods that are high in fiber. These include beans, whole grains, and fresh fruits and vegetables.  § Limit foods that are high in fat and sugar. These include fried or sweet foods.  Lifestyle  · Do not drink alcohol.  · Do not use any products that contain nicotine or tobacco, such as cigarettes, e-cigarettes, and chewing tobacco. If you need help quitting, ask your doctor.  · Get at least 8 hours of sleep every night.  · Limit and deal with stress.  General instructions         · Keep a journal to find out what may bring on your migraine headaches. For example, write down:  ? What you eat and drink.  ? How much sleep you get.  ? Any change in what you eat or drink.  ? Any change in your medicines.  · If you have a migraine headache:  ? Avoid things that make your symptoms worse, such as bright lights.  ? It may help to lie down in a dark, quiet room.  ? Do not drive or use heavy machinery.  ? Ask your doctor what activities are safe for you.  · Keep all follow-up visits as told by your doctor. This is important.  Contact a doctor if:  · You get a migraine headache that is different or worse than others you have had.  · You have more than 15 headache days in one month.  Get help right away if:  · Your migraine headache gets very bad.  · Your migraine headache lasts longer than 72 hours.  · You have a fever.  · You have a stiff neck.  · You have trouble seeing.  · Your muscles feel weak or like you cannot control them.  · You start to lose your balance a lot.  · You start to have trouble walking.  · You pass out (faint).  · You have a seizure.  Summary  · A migraine headache is a very strong throbbing pain on one side or both sides of your head. These  headaches can also cause other symptoms.  · This condition may be treated with medicines and changes to your lifestyle.  · Keep a journal to find out what may bring on your migraine headaches.  · Contact a doctor if you get a migraine headache that is different or worse than others you have had.  · Contact your doctor if you have more than 15 headache days in a month.  This information is not intended to replace advice given to you by your health care provider. Make sure you discuss any questions you have with your health care provider.  Document Revised: 04/10/2020 Document Reviewed: 01/30/2020  [employer]vier Patient Education © 2020 [employer]    Pap Test  Why am I having this test?  A Pap test, also called a Pap smear, is a screening test to check for signs of:  · Cancer of the vagina, cervix, and uterus. The cervix is the lower part of the uterus that opens into the vagina.  · Infection.  · Changes that may be a sign that cancer is developing (precancerous changes).  Women need this test on a regular basis. In general, you should have a Pap test every 3 years until you reach menopause or age 65. Women aged 30-60 may choose to have their Pap test done at the same time as an HPV (human papillomavirus) test every 5 years (instead of every 3 years).  Your health care provider may recommend having Pap tests more or less often depending on your medical conditions and past Pap test results.  What kind of sample is taken?    Your health care provider will collect a sample of cells from the surface of your cervix. This will be done using a small cotton swab, plastic spatula, or brush. This sample is often collected during a pelvic exam, when you are lying on your back on an exam table with feet in footrests (stirrups).  In some cases, fluids (secretions) from the cervix or vagina may also be collected.  How do I prepare for this test?  · Be aware of where you are in your menstrual cycle. If you are menstruating on the day  of the test, you may be asked to reschedule.  · You may need to reschedule if you have a known vaginal infection on the day of the test.  · Follow instructions from your health care provider about:  ? Changing or stopping your regular medicines. Some medicines can cause abnormal test results, such as digitalis and tetracycline.  ? Avoiding douching or taking a bath the day before or the day of the test.  Tell a health care provider about:  · Any allergies you have.  · All medicines you are taking, including vitamins, herbs, eye drops, creams, and over-the-counter medicines.  · Any blood disorders you have.  · Any surgeries you have had.  · Any medical conditions you have.  · Whether you are pregnant or may be pregnant.  How are the results reported?  Your test results will be reported as either abnormal or normal.  A false-positive result can occur. A false positive is incorrect because it means that a condition is present when it is not.  A false-negative result can occur. A false negative is incorrect because it means that a condition is not present when it is.  What do the results mean?  A normal test result means that you do not have signs of cancer of the vagina, cervix, or uterus.  An abnormal result may mean that you have:  · Cancer. A Pap test by itself is not enough to diagnose cancer. You will have more tests done in this case.  · Precancerous changes in your vagina, cervix, or uterus.  · Inflammation of the cervix.  · An STD (sexually transmitted disease).  · A fungal infection.  · A parasite infection.  Talk with your health care provider about what your results mean.  Questions to ask your health care provider  Ask your health care provider, or the department that is doing the test:  · When will my results be ready?  · How will I get my results?  · What are my treatment options?  · What other tests do I need?  · What are my next steps?  Summary  · In general, women should have a Pap test every 3 years  until they reach menopause or age 65.  · Your health care provider will collect a sample of cells from the surface of your cervix. This will be done using a small cotton swab, plastic spatula, or brush.  · In some cases, fluids (secretions) from the cervix or vagina may also be collected.  This information is not intended to replace advice given to you by your health care provider. Make sure you discuss any questions you have with your health care provider.  Document Revised: 08/27/2018 Document Reviewed: 08/27/2018  [employer]vier Patient Education © 2020 [employer]

## 2020-12-22 DIAGNOSIS — G47.00 INSOMNIA, UNSPECIFIED TYPE: ICD-10-CM

## 2020-12-22 DIAGNOSIS — F33.3 SEVERE EPISODE OF RECURRENT MAJOR DEPRESSIVE DISORDER, WITH PSYCHOTIC FEATURES (HCC): ICD-10-CM

## 2020-12-22 DIAGNOSIS — F41.1 GENERALIZED ANXIETY DISORDER: ICD-10-CM

## 2020-12-22 RX ORDER — ARIPIPRAZOLE 2 MG/1
2 TABLET ORAL DAILY
Qty: 30 TABLET | Refills: 0 | OUTPATIENT
Start: 2020-12-22

## 2020-12-22 RX ORDER — PRAZOSIN HYDROCHLORIDE 1 MG/1
1 CAPSULE ORAL NIGHTLY
Qty: 30 CAPSULE | Refills: 0 | OUTPATIENT
Start: 2020-12-22

## 2020-12-22 RX ORDER — BUPROPION HYDROCHLORIDE 300 MG/1
300 TABLET ORAL DAILY
Qty: 30 TABLET | Refills: 0 | OUTPATIENT
Start: 2020-12-22

## 2020-12-22 RX ORDER — FLUOXETINE HYDROCHLORIDE 40 MG/1
40 CAPSULE ORAL DAILY
Qty: 30 CAPSULE | Refills: 0 | OUTPATIENT
Start: 2020-12-22

## 2020-12-23 LAB
CONV .: NORMAL
CYTOLOGIST CVX/VAG CYTO: NORMAL
CYTOLOGY CVX/VAG DOC CYTO: NORMAL
CYTOLOGY CVX/VAG DOC THIN PREP: NORMAL
DX ICD CODE: NORMAL
HIV 1 & 2 AB SER-IMP: NORMAL
OTHER STN SPEC: NORMAL
STAT OF ADQ CVX/VAG CYTO-IMP: NORMAL

## 2020-12-29 ENCOUNTER — TELEMEDICINE (OUTPATIENT)
Dept: PSYCHIATRY | Facility: CLINIC | Age: 25
End: 2020-12-29

## 2020-12-29 DIAGNOSIS — F41.1 GENERALIZED ANXIETY DISORDER: ICD-10-CM

## 2020-12-29 DIAGNOSIS — G47.00 INSOMNIA, UNSPECIFIED TYPE: ICD-10-CM

## 2020-12-29 DIAGNOSIS — F39 MOOD DISORDER (HCC): ICD-10-CM

## 2020-12-29 DIAGNOSIS — E66.01 CLASS 3 SEVERE OBESITY DUE TO EXCESS CALORIES WITH SERIOUS COMORBIDITY AND BODY MASS INDEX (BMI) OF 50.0 TO 59.9 IN ADULT (HCC): ICD-10-CM

## 2020-12-29 DIAGNOSIS — F33.3 SEVERE EPISODE OF RECURRENT MAJOR DEPRESSIVE DISORDER, WITH PSYCHOTIC FEATURES (HCC): Primary | ICD-10-CM

## 2020-12-29 PROCEDURE — 99214 OFFICE O/P EST MOD 30 MIN: CPT | Performed by: NURSE PRACTITIONER

## 2020-12-29 RX ORDER — BUPROPION HYDROCHLORIDE 300 MG/1
300 TABLET ORAL DAILY
Qty: 30 TABLET | Refills: 0 | Status: SHIPPED | OUTPATIENT
Start: 2020-12-29 | End: 2021-01-28 | Stop reason: SDUPTHER

## 2020-12-29 RX ORDER — ARIPIPRAZOLE 2 MG/1
2 TABLET ORAL DAILY
Qty: 30 TABLET | Refills: 0 | Status: SHIPPED | OUTPATIENT
Start: 2020-12-29 | End: 2021-01-28 | Stop reason: SDUPTHER

## 2020-12-29 RX ORDER — FLUOXETINE HYDROCHLORIDE 20 MG/1
20 CAPSULE ORAL DAILY
Qty: 30 CAPSULE | Refills: 0 | Status: SHIPPED | OUTPATIENT
Start: 2020-12-29 | End: 2021-01-28 | Stop reason: SDUPTHER

## 2020-12-29 RX ORDER — PRAZOSIN HYDROCHLORIDE 1 MG/1
1 CAPSULE ORAL NIGHTLY
Qty: 30 CAPSULE | Refills: 0 | Status: SHIPPED | OUTPATIENT
Start: 2020-12-29 | End: 2021-01-28 | Stop reason: SDUPTHER

## 2020-12-29 RX ORDER — FLUOXETINE HYDROCHLORIDE 40 MG/1
40 CAPSULE ORAL DAILY
Qty: 30 CAPSULE | Refills: 0 | Status: SHIPPED | OUTPATIENT
Start: 2020-12-29 | End: 2021-01-28 | Stop reason: SDUPTHER

## 2020-12-29 NOTE — PROGRESS NOTES
This provider is located at [facility], [address] [place] [address], [facility], [address] using a telephone in a secure private environment. The Patient is seen remotely at their home address in [place], using a private telephone.  The patient is unable to be seen through a MyChart Video Visit through [facility] at today's encounter due to technical difficulties, therefore a telephone encounter was conducted. Patient is being evaluated/treated via telehealth by telephone, and stated they are in a secure environment for this session. The patient's condition being diagnosed/treated is appropriate for telemedicine. The provider identified herself as well as her credentials.   The patient, and/or patient's guardian, consent to be seen remotely, and when consent is given they understand that the consent allows for patient identifiable information to be sent to a third party as needed.   They may refuse to be seen remotely at any time. The electronic data is encrypted and password protected, and the patient and/or guardian has been advised of the potential risks to privacy not withstanding such measures.    You have chosen to receive care through a telephone visit. Do you consent to use a telephone visit for your medical care today? Yes  This visit has been rescheduled as a phone visit to comply with patient safety concerns in accordance with CDC recommendations.      Subjective   Kamryn Gross is a 25 y.o. female who presents today for follow up    Chief Complaint:  Depression, anxiety    History of Present Illness: Patient presents as follow-up.  She reports increase with depression with decrease energy and motivation.  She denies any side effects of medication.  She does report having fleeting suicidal ideations last week, she adamantly denies SI at this time.  She contributes some depression to not being able to have [place] with her family last week due to weather, they are having [place] dinner and exchanging gifts this  weekend.  She rates depression 8/10 with 10 being the worst.  Rates anxiety 3/10 with 10 being the worst.  Reports sleep is good, denies nightmares.  Reports appetite is good.  She denies SI/HI/AVH.    The following portions of the patient's history were reviewed and updated as appropriate: allergies, current medications, past family history, past medical history, past social history, past surgical history and problem list.      Past Medical History:  Past Medical History:   Diagnosis Date   • Anxiety    • Bipolar disorder (CMS/[facility])    • Depression    • Headache    • Obesity    • Panic disorder    • PTSD (post-traumatic stress disorder)    • Self-injurious behavior    • Suicide attempt (CMS/[facility])     attempted overdose in 2017       Social History:  Social History     Socioeconomic History   • Marital status: Single     Spouse name: Not on file   • Number of children: Not on file   • Years of education: Not on file   • Highest education level: Not on file   Tobacco Use   • Smoking status: Current Every Day Smoker     Packs/day: 1.50     Years: 4.00     Pack years: 6.00   • Smokeless tobacco: Never Used   • Tobacco comment: pt. reports she is not wanting to quit   Substance and Sexual Activity   • Alcohol use: Not Currently     Frequency: Never   • Drug use: Yes     Types: Marijuana   • Sexual activity: Yes     Partners: Male     Birth control/protection: None       Family History:  Family History   Problem Relation Age of Onset   • Diabetes Mother    • Anxiety disorder Mother    • Depression Mother    • Anxiety disorder Father    • Depression Father    • Anxiety disorder Sister    • Depression Sister    • Depression Maternal Aunt    • Heart attack Paternal Aunt    • Anxiety disorder Paternal Aunt    • Depression Paternal Aunt    • No Known Problems Maternal Uncle    • No Known Problems Paternal Uncle    • Lung disease Maternal Grandfather    • Alzheimer's disease Maternal Grandmother    • Lung cancer Paternal  Grandfather    • Alzheimer's disease Paternal Grandmother    • Alcohol abuse Paternal Grandmother    • Anxiety disorder Sister    • Depression Sister    • Anxiety disorder Paternal Aunt    • Depression Paternal Aunt    • No Known Problems Maternal Uncle    • No Known Problems Paternal Uncle    • Heart attack Paternal Uncle    • Other Paternal Uncle        Past Surgical History:  Past Surgical History:   Procedure Laterality Date   • TONSILLECTOMY         Problem List:  Patient Active Problem List   Diagnosis   • Major depressive disorder, recurrent, moderate (CMS/HCC)   • Anxiety   • Class 3 severe obesity without serious comorbidity with body mass index (BMI) of 50.0 to 59.9 in adult (CMS/HCC)       Allergy:   No Known Allergies     Current Medications:   Current Outpatient Medications   Medication Sig Dispense Refill   • ARIPiprazole (Abilify) 2 MG tablet Take 1 tablet by mouth Daily. 30 tablet 0   • buPROPion XL (WELLBUTRIN XL) 300 MG 24 hr tablet Take 1 tablet by mouth Daily. 30 tablet 0   • FLUoxetine (PROzac) 20 MG capsule Take 1 capsule by mouth Daily. Take 1 capsule with a 40 mg capsule to equal dosage of 60 mg daily 30 capsule 0   • FLUoxetine (PROzac) 40 MG capsule Take 1 capsule by mouth Daily. Take 1 capsule with a 20 mg capsule to equal dosage of 60 mg daily 30 capsule 0   • prazosin (MINIPRESS) 1 MG capsule Take 1 capsule by mouth Every Night. 30 capsule 0   • SUMAtriptan (Imitrex) 25 MG tablet Take one tablet at onset of headache. May repeat dose one time in 2 hours if headache not relieved. 20 tablet 0     No current facility-administered medications for this visit.        Review of Symptoms:    Review of Systems   Constitutional: Positive for fatigue.   HENT: Negative.    Eyes: Negative.    Respiratory: Negative.    Cardiovascular: Negative.    Gastrointestinal: Negative.    Neurological: Negative.    Psychiatric/Behavioral: Positive for agitation and depressed mood. The patient is nervous/anxious.           Physical Exam:   There were no vitals taken for this visit.   There is no height or weight on file to calculate BMI.    Due to extenuating circumstances and possible current health risks associated with the patient being present in a clinical setting (with current health restrictions in place in regards to possible COVID 19 transmission/exposure), the patient was seen remotely today via a MyChart Video Visit through [facility] and telephone encounter.  Unable to obtain vital signs due to nature of remote visit.  Height stated at 70 inches.  Weight stated at 370 pounds.         Mental Status Exam:   Hygiene:   Unable to assess due to telephone visit  Cooperation:  Cooperative  Eye Contact:  Unable to assess due to telephone visit  Psychomotor Behavior:  Unable to assess due to telephone visit  Affect:  Blunted  Mood: normal  Hopelessness: 3  Speech:  Minimal  Thought Process:  Linear  Thought Content:  Normal and Mood congruent  Suicidal:  None  Homicidal:  None  Hallucinations:  None  Delusion:  None  Memory:  Intact  Orientation:  Person, Place, Time and Situation  Reliability:  fair  Insight:  Fair  Judgement:  Fair  Impulse Control:  Fair  Physical/Medical Issues:  No      PHQ-Score Total:  PHQ-9 Total Score: 8      Lab Results:   Orders Only on 12/18/2020   Component Date Value Ref Range Status   • Diagnosis 12/18/2020 Comment   Final    NEGATIVE FOR INTRAEPITHELIAL LESION OR MALIGNANCY.   • Specimen adequacy: 12/18/2020 Comment   Final    Comment: Satisfactory for evaluation.  Endocervical and/or squamous metaplastic  cells (endocervical component) are present.     • Clinician Provided ICD-10: 12/18/2020 Comment   Final    Z12.4   • Performed by: 12/18/2020 Comment   Final    Arabella August Cytotechnologist (ASCP)   • . 12/18/2020 .   Final   • Note: 12/18/2020 Comment   Final    Comment: The Pap smear is a screening test designed to aid in the detection of  premalignant and malignant conditions of the  uterine cervix.  It is not a  diagnostic procedure and should not be used as the sole means of detecting  cervical cancer.  Both false-positive and false-negative reports do occur.     • Method: 12/18/2020 Comment   Final    Comment: This liquid based ThinPrep(R) pap test was screened with the  use of an image guided system.     • Conv .conv 12/18/2020 Comment   Final    Comment: The HPV DNA reflex criteria were not met with this specimen result  therefore, no HPV testing was performed.         Assessment/Plan   Diagnoses and all orders for this visit:    1. Severe episode of recurrent major depressive disorder, with psychotic features (CMS/HCC) (Primary)  -     buPROPion XL (WELLBUTRIN XL) 300 MG 24 hr tablet; Take 1 tablet by mouth Daily.  Dispense: 30 tablet; Refill: 0  -     FLUoxetine (PROzac) 40 MG capsule; Take 1 capsule by mouth Daily. Take 1 capsule with a 20 mg capsule to equal dosage of 60 mg daily  Dispense: 30 capsule; Refill: 0  -     ARIPiprazole (Abilify) 2 MG tablet; Take 1 tablet by mouth Daily.  Dispense: 30 tablet; Refill: 0  -     prazosin (MINIPRESS) 1 MG capsule; Take 1 capsule by mouth Every Night.  Dispense: 30 capsule; Refill: 0  -     FLUoxetine (PROzac) 20 MG capsule; Take 1 capsule by mouth Daily. Take 1 capsule with a 40 mg capsule to equal dosage of 60 mg daily  Dispense: 30 capsule; Refill: 0    2. Generalized anxiety disorder  -     buPROPion XL (WELLBUTRIN XL) 300 MG 24 hr tablet; Take 1 tablet by mouth Daily.  Dispense: 30 tablet; Refill: 0  -     FLUoxetine (PROzac) 40 MG capsule; Take 1 capsule by mouth Daily. Take 1 capsule with a 20 mg capsule to equal dosage of 60 mg daily  Dispense: 30 capsule; Refill: 0  -     ARIPiprazole (Abilify) 2 MG tablet; Take 1 tablet by mouth Daily.  Dispense: 30 tablet; Refill: 0  -     prazosin (MINIPRESS) 1 MG capsule; Take 1 capsule by mouth Every Night.  Dispense: 30 capsule; Refill: 0  -     FLUoxetine (PROzac) 20 MG capsule; Take 1 capsule by  mouth Daily. Take 1 capsule with a 40 mg capsule to equal dosage of 60 mg daily  Dispense: 30 capsule; Refill: 0    3. Insomnia, unspecified type  -     prazosin (MINIPRESS) 1 MG capsule; Take 1 capsule by mouth Every Night.  Dispense: 30 capsule; Refill: 0    4. Class 3 severe obesity due to excess calories with serious comorbidity and body mass index (BMI) of 50.0 to 59.9 in adult (CMS/[facility])    5. Mood disorder (CMS/[facility]) R/O Bipolar Spectrum Disorder      -Increase fluoxetine 60 mg daily for anxiety and depression  -Continue aripiprazole 2 mg daily for anxiety depression  -Continue bupropion 300 mg daily for anxiety and depression  -Continue prazosin 1 mg nightly for nightmares  -Encourage patient to continue psychotherapy  -MARCUS reviewed and appropriate. Patient counseled on use of controlled substances.   -The benefits of a healthy diet and exercise were discussed with patient, especially the positive effects they have on mental health. Patient encouraged to consider lifestyle modification regarding  diet and exercise patterns to maximize results of mental health treatment.  -Reviewed previous available documentation  -Reviewed most recent available labs   - Began at 8:05 AM and ended at 8:30 AM    Visit Diagnoses:    ICD-10-CM ICD-9-CM   1. Severe episode of recurrent major depressive disorder, with psychotic features (CMS/[facility])  F33.3 296.34   2. Generalized anxiety disorder  F41.1 300.02   3. Insomnia, unspecified type  G47.00 780.52   4. Class 3 severe obesity due to excess calories with serious comorbidity and body mass index (BMI) of 50.0 to 59.9 in adult (CMS/[facility])  E66.01 278.01    Z68.43 V85.43   5. Mood disorder (CMS/[facility]) R/O Bipolar Spectrum Disorder  F39 296.90         GOALS:  Short Term Goals: Patient will be compliant with medication, and patient will have no significant medication related side effects.  Patient will be engaged in psychotherapy as indicated.  Patient will report subjective  improvement of symptoms.  Long term goals: To stabilize mood and treat/improve subjective symptoms, the patient will stay out of the hospital, the patient will be at an optimal level of functioning, and the patient will take all medications as prescribed.  The patient/guardian verbalized understanding and agreement with goals that were mutually set.      TREATMENT PLAN: Continue supportive psychotherapy efforts and medications as indicated. Pharmacological and Non-Pharmacological treatment options discussed during today's visit. Patient/Guardian acknowledged and verbally consented with current treatment plan and was educated on the importance of compliance with treatment and follow-up appointments.      MEDICATION ISSUES:    Discussed medication options and treatment plan of prescribed medication as well as the risks, benefits, any black box warnings, and side effects including potential falls, possible impaired driving, and metabolic adversities among others. Patient is agreeable to call the office with any worsening of symptoms or onset of side effects, or if any concerns or questions arise.  The contact information for the office is made available to the patient. Patient is agreeable to call 911 or go to the nearest ER should they begin having any SI/HI, or if any urgent concerns arise. No medication side effects or related complaints today.     MEDS ORDERED DURING VISIT:  New Medications Ordered This Visit   Medications   • buPROPion XL (WELLBUTRIN XL) 300 MG 24 hr tablet     Sig: Take 1 tablet by mouth Daily.     Dispense:  30 tablet     Refill:  0   • FLUoxetine (PROzac) 40 MG capsule     Sig: Take 1 capsule by mouth Daily. Take 1 capsule with a 20 mg capsule to equal dosage of 60 mg daily     Dispense:  30 capsule     Refill:  0   • ARIPiprazole (Abilify) 2 MG tablet     Sig: Take 1 tablet by mouth Daily.     Dispense:  30 tablet     Refill:  0   • prazosin (MINIPRESS) 1 MG capsule     Sig: Take 1 capsule by  mouth Every Night.     Dispense:  30 capsule     Refill:  0   • FLUoxetine (PROzac) 20 MG capsule     Sig: Take 1 capsule by mouth Daily. Take 1 capsule with a 40 mg capsule to equal dosage of 60 mg daily     Dispense:  30 capsule     Refill:  0       Return in about 4 weeks (around 1/26/2021), or if symptoms worsen or fail to improve.         Progress toward goal: Not at goal    Functional Status: Mild impairment     Prognosis: Guarded with Ongoing Treatment          This document has been electronically signed by ELISE Griffiths  December 29, 2020 09:19 EST    Part of this note may be an electronic transcription/translation of spoken language to printed text using the Dragon Dictation System.

## 2021-01-28 DIAGNOSIS — F33.3 SEVERE EPISODE OF RECURRENT MAJOR DEPRESSIVE DISORDER, WITH PSYCHOTIC FEATURES (HCC): ICD-10-CM

## 2021-01-28 DIAGNOSIS — F41.1 GENERALIZED ANXIETY DISORDER: ICD-10-CM

## 2021-01-28 DIAGNOSIS — G47.00 INSOMNIA, UNSPECIFIED TYPE: ICD-10-CM

## 2021-01-28 RX ORDER — FLUOXETINE HYDROCHLORIDE 40 MG/1
40 CAPSULE ORAL DAILY
Qty: 14 CAPSULE | Refills: 0 | Status: SHIPPED | OUTPATIENT
Start: 2021-01-28 | End: 2021-02-17 | Stop reason: SDUPTHER

## 2021-01-28 RX ORDER — ARIPIPRAZOLE 2 MG/1
2 TABLET ORAL DAILY
Qty: 14 TABLET | Refills: 0 | Status: SHIPPED | OUTPATIENT
Start: 2021-01-28 | End: 2021-02-19 | Stop reason: SDUPTHER

## 2021-01-28 RX ORDER — BUPROPION HYDROCHLORIDE 300 MG/1
300 TABLET ORAL DAILY
Qty: 14 TABLET | Refills: 0 | Status: SHIPPED | OUTPATIENT
Start: 2021-01-28 | End: 2021-02-17 | Stop reason: SDUPTHER

## 2021-01-28 RX ORDER — PRAZOSIN HYDROCHLORIDE 1 MG/1
1 CAPSULE ORAL NIGHTLY
Qty: 14 CAPSULE | Refills: 0 | Status: SHIPPED | OUTPATIENT
Start: 2021-01-28 | End: 2021-02-19 | Stop reason: SDUPTHER

## 2021-01-28 RX ORDER — FLUOXETINE HYDROCHLORIDE 20 MG/1
20 CAPSULE ORAL DAILY
Qty: 14 CAPSULE | Refills: 0 | Status: SHIPPED | OUTPATIENT
Start: 2021-01-28 | End: 2021-05-25

## 2021-02-11 ENCOUNTER — TELEMEDICINE (OUTPATIENT)
Dept: PSYCHIATRY | Facility: CLINIC | Age: 26
End: 2021-02-11

## 2021-02-11 DIAGNOSIS — F39 MOOD DISORDER (HCC): ICD-10-CM

## 2021-02-11 DIAGNOSIS — F33.3 SEVERE EPISODE OF RECURRENT MAJOR DEPRESSIVE DISORDER, WITH PSYCHOTIC FEATURES (HCC): Primary | ICD-10-CM

## 2021-02-11 DIAGNOSIS — F41.1 GENERALIZED ANXIETY DISORDER: ICD-10-CM

## 2021-02-11 DIAGNOSIS — F41.9 ANXIETY: ICD-10-CM

## 2021-02-11 DIAGNOSIS — F60.3 BORDERLINE PERSONALITY DISORDER (HCC): ICD-10-CM

## 2021-02-11 DIAGNOSIS — G47.00 INSOMNIA, UNSPECIFIED TYPE: ICD-10-CM

## 2021-02-11 PROCEDURE — 90834 PSYTX W PT 45 MINUTES: CPT | Performed by: COUNSELOR
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
## 2021-02-15 NOTE — PLAN OF CARE
Quarterly Review  Problem:  Patient Care Overview (Adult)  Goal: Plan of Care Review  Outcome: Ongoing, Not Progressing

## 2021-02-15 NOTE — TREATMENT PLAN
Multi-Disciplinary Problems (from Behavioral Health Treatment Plan)    Active Problems     Problem:  Patient Care Overview (Adult)  Start Date: 02/11/21    Problem Details: Patient indicates she is encouraged to continue working on personal exploration of strengths/challenges in order to learn how to cope with psychosis and mood swings. Patient has identified working and keeping busy with outside activities with father helped her to not think negatively of herself, is looking for a job; had an interview at [employer] but hasn't heard anything from them, consider the job shop      Goal Priority Start Date Expected End Date End Date    Plan of Care Review -- 02/11/21 -- --    Goal Details: Prognosis: Fair with Ongoing Treatment .  Patient continues to struggle with a(n) chronic/pervasive mental illness which continues to cause impairment in at least two important important areas of functioning.   As a result, she can be reasonably expected to continue to benefit from treatment and would likely be at increased risk for decompensation should treatment be discontinued.  Patient verbalizes she is benefiting from therapy and appears to meet outpatient level of care.                           I have discussed and reviewed this treatment plan with the patient.  It has been printed for signatures.

## 2021-02-17 DIAGNOSIS — F41.1 GENERALIZED ANXIETY DISORDER: ICD-10-CM

## 2021-02-17 DIAGNOSIS — F33.3 SEVERE EPISODE OF RECURRENT MAJOR DEPRESSIVE DISORDER, WITH PSYCHOTIC FEATURES (HCC): ICD-10-CM

## 2021-02-17 RX ORDER — BUPROPION HYDROCHLORIDE 300 MG/1
300 TABLET ORAL DAILY
Qty: 14 TABLET | Refills: 0 | Status: SHIPPED | OUTPATIENT
Start: 2021-02-17 | End: 2021-02-19 | Stop reason: SDUPTHER

## 2021-02-17 RX ORDER — FLUOXETINE HYDROCHLORIDE 40 MG/1
40 CAPSULE ORAL DAILY
Qty: 14 CAPSULE | Refills: 0 | Status: SHIPPED | OUTPATIENT
Start: 2021-02-17 | End: 2021-02-19 | Stop reason: SDUPTHER

## 2021-02-17 RX ORDER — FLUOXETINE HYDROCHLORIDE 20 MG/1
20 CAPSULE ORAL DAILY
Qty: 14 CAPSULE | Refills: 0 | Status: SHIPPED | OUTPATIENT
Start: 2021-02-17 | End: 2021-02-19 | Stop reason: SDUPTHER

## 2021-02-19 ENCOUNTER — TELEMEDICINE (OUTPATIENT)
Dept: PSYCHIATRY | Facility: CLINIC | Age: 26
End: 2021-02-19

## 2021-02-19 DIAGNOSIS — F39 MOOD DISORDER (HCC): ICD-10-CM

## 2021-02-19 DIAGNOSIS — G47.00 INSOMNIA, UNSPECIFIED TYPE: ICD-10-CM

## 2021-02-19 DIAGNOSIS — E66.01 CLASS 3 SEVERE OBESITY DUE TO EXCESS CALORIES WITH SERIOUS COMORBIDITY AND BODY MASS INDEX (BMI) OF 50.0 TO 59.9 IN ADULT (HCC): ICD-10-CM

## 2021-02-19 DIAGNOSIS — F41.1 GENERALIZED ANXIETY DISORDER: ICD-10-CM

## 2021-02-19 DIAGNOSIS — Z79.899 MEDICATION MANAGEMENT: ICD-10-CM

## 2021-02-19 DIAGNOSIS — F33.3 SEVERE EPISODE OF RECURRENT MAJOR DEPRESSIVE DISORDER, WITH PSYCHOTIC FEATURES (HCC): Primary | ICD-10-CM

## 2021-02-19 PROCEDURE — 99214 OFFICE O/P EST MOD 30 MIN: CPT | Performed by: NURSE PRACTITIONER

## 2021-02-19 RX ORDER — ARIPIPRAZOLE 5 MG/1
5 TABLET ORAL DAILY
Qty: 30 TABLET | Refills: 0 | Status: SHIPPED | OUTPATIENT
Start: 2021-02-19 | End: 2021-04-19 | Stop reason: SDUPTHER

## 2021-02-19 RX ORDER — PRAZOSIN HYDROCHLORIDE 1 MG/1
1 CAPSULE ORAL NIGHTLY
Qty: 30 CAPSULE | Refills: 0 | Status: SHIPPED | OUTPATIENT
Start: 2021-02-19 | End: 2021-04-19 | Stop reason: SDUPTHER

## 2021-02-19 RX ORDER — FLUOXETINE HYDROCHLORIDE 40 MG/1
CAPSULE ORAL
Qty: 30 CAPSULE | Refills: 0 | Status: SHIPPED | OUTPATIENT
Start: 2021-02-19 | End: 2021-04-19 | Stop reason: SDUPTHER

## 2021-02-19 RX ORDER — BUPROPION HYDROCHLORIDE 300 MG/1
300 TABLET ORAL DAILY
Qty: 30 TABLET | Refills: 0 | Status: SHIPPED | OUTPATIENT
Start: 2021-02-19 | End: 2021-04-19 | Stop reason: SDUPTHER

## 2021-02-19 RX ORDER — FLUOXETINE HYDROCHLORIDE 20 MG/1
CAPSULE ORAL
Qty: 30 CAPSULE | Refills: 0 | Status: SHIPPED | OUTPATIENT
Start: 2021-02-19 | End: 2021-04-19 | Stop reason: SDUPTHER

## 2021-02-19 NOTE — PROGRESS NOTES
This provider is located at [facility], [address] [place] [address], [facility], [id number] using a telephone in a secure private environment. The Patient is seen remotely at their home address in [place], using a private telephone.  The patient is unable to be seen through a MyChart Video Visit through [facility] at today's encounter due to technical difficulties, therefore a telephone encounter was conducted. Patient is being evaluated/treated via telehealth by telephone, and stated they are in a secure environment for this session. The patient's condition being diagnosed/treated is appropriate for telemedicine. The provider identified herself as well as her credentials.   The patient, and/or patient's guardian, consent to be seen remotely, and when consent is given they understand that the consent allows for patient identifiable information to be sent to a third party as needed.   They may refuse to be seen remotely at any time. The electronic data is encrypted and password protected, and the patient and/or guardian has been advised of the potential risks to privacy not withstanding such measures.    You have chosen to receive care through a telephone visit. Do you consent to use a telephone visit for your medical care today? Yes  This visit has been rescheduled as a phone visit to comply with patient safety concerns in accordance with CDC recommendations.      Subjective   Kamryn Gross is a 25 y.o. female who presents today for follow up    Chief Complaint:  Depression, anxiety    History of Present Illness: Patient presents as follow-up via telephone visit. Sh reports improvement with depression with increase of fluoxetine, states she continues to struggle with motivation. She denies any side effects of medication. Reports she is going to therapy regularly and feels it is beneficial for her.   She rates depression 5/10 with 10 being the worst.  Rates anxiety 3/10 with 10 being the worst.  Reports sleep is good, denies nightmares.  " Reports appetite is good, shares that she has been \"stress eating\".  She denies SI/HI/AVH.    The following portions of the patient's history were reviewed and updated as appropriate: allergies, current medications, past family history, past medical history, past social history, past surgical history and problem list.      Past Medical History:  Past Medical History:   Diagnosis Date   • Anxiety    • Bipolar disorder (CMS/HCC)    • Depression    • Headache    • Obesity    • Panic disorder    • PTSD (post-traumatic stress disorder)    • Self-injurious behavior    • Suicide attempt (CMS/[facility])     attempted overdose in 2017       Social History:  Social History     Socioeconomic History   • Marital status: Single     Spouse name: Not on file   • Number of children: Not on file   • Years of education: Not on file   • Highest education level: Not on file   Tobacco Use   • Smoking status: Current Every Day Smoker     Packs/day: 1.50     Years: 4.00     Pack years: 6.00   • Smokeless tobacco: Never Used   • Tobacco comment: pt. reports she is not wanting to quit   Substance and Sexual Activity   • Alcohol use: Not Currently     Frequency: Never   • Drug use: Not Currently   • Sexual activity: Yes     Partners: Male     Birth control/protection: None       Family History:  Family History   Problem Relation Age of Onset   • Diabetes Mother    • Anxiety disorder Mother    • Depression Mother    • Anxiety disorder Father    • Depression Father    • Anxiety disorder Sister    • Depression Sister    • Depression Maternal Aunt    • Heart attack Paternal Aunt    • Anxiety disorder Paternal Aunt    • Depression Paternal Aunt    • No Known Problems Maternal Uncle    • No Known Problems Paternal Uncle    • Lung disease Maternal Grandfather    • Alzheimer's disease Maternal Grandmother    • Lung cancer Paternal Grandfather    • Alzheimer's disease Paternal Grandmother    • Alcohol abuse Paternal Grandmother    • Anxiety disorder " Sister    • Depression Sister    • Anxiety disorder Paternal Aunt    • Depression Paternal Aunt    • No Known Problems Maternal Uncle    • No Known Problems Paternal Uncle    • Heart attack Paternal Uncle    • Other Paternal Uncle        Past Surgical History:  Past Surgical History:   Procedure Laterality Date   • TONSILLECTOMY         Problem List:  Patient Active Problem List   Diagnosis   • Major depressive disorder, recurrent, moderate (CMS/HCC)   • Anxiety   • Class 3 severe obesity without serious comorbidity with body mass index (BMI) of 50.0 to 59.9 in adult (CMS/HCC)       Allergy:   No Known Allergies     Current Medications:   Current Outpatient Medications   Medication Sig Dispense Refill   • ARIPiprazole (ABILIFY) 5 MG tablet Take 1 tablet by mouth Daily. 30 tablet 0   • buPROPion XL (WELLBUTRIN XL) 300 MG 24 hr tablet Take 1 tablet by mouth Daily. 30 tablet 0   • FLUoxetine (PROzac) 20 MG capsule Take one capsule daily with one 40 mg capsule to equal dosage of 60 mg daily 30 capsule 0   • FLUoxetine (PROzac) 40 MG capsule Take one capsule daily with one 20 mg capsule to equal dosage of 60 mg daily 30 capsule 0   • prazosin (MINIPRESS) 1 MG capsule Take 1 capsule by mouth Every Night. 30 capsule 0   • SUMAtriptan (Imitrex) 25 MG tablet Take one tablet at onset of headache. May repeat dose one time in 2 hours if headache not relieved. 20 tablet 0     No current facility-administered medications for this visit.        Review of Symptoms:    Review of Systems   Constitutional: Positive for fatigue.   HENT: Negative.    Eyes: Negative.    Respiratory: Negative.    Cardiovascular: Negative.    Gastrointestinal: Negative.    Genitourinary: Negative.    Musculoskeletal: Negative.    Skin: Negative.    Neurological: Negative.    Psychiatric/Behavioral: Positive for decreased concentration and depressed mood. Negative for suicidal ideas. The patient is nervous/anxious.          Physical Exam:   There were no  vitals taken for this visit.   There is no height or weight on file to calculate BMI.    Due to extenuating circumstances and possible current health risks associated with the patient being present in a clinical setting (with current health restrictions in place in regards to possible COVID 19 transmission/exposure), the patient was seen remotely today via a MyChart Video Visit through [facility] and telephone encounter.  Unable to obtain vital signs due to nature of remote visit.  Height stated at 70 inches.  Weight stated at 370 pounds.         Mental Status Exam:   Hygiene:   Unable to assess due to telephone visit  Cooperation:  Cooperative  Eye Contact:  Unable to assess due to telephone visit  Psychomotor Behavior:  Unable to assess due to telephone visit  Affect:  Blunted  Mood: normal  Hopelessness: 3  Speech:  Minimal  Thought Process:  Linear  Thought Content:  Normal and Mood congruent  Suicidal:  None  Homicidal:  None  Hallucinations:  None  Delusion:  None  Memory:  Intact  Orientation:  Person, Place, Time and Situation  Reliability:  fair  Insight:  Fair  Judgement:  Fair  Impulse Control:  Fair  Physical/Medical Issues:  No      PHQ-Score Total:  PHQ-9 Total Score: 7 Patient screened positive for depression based on a PHQ-9 score of 7 on 2/19/2021. Follow-up recommendations include: Prescribed antidepressant medication treatment and Suicide Risk Assessment performed.        Lab Results:   No visits with results within 1 Month(s) from this visit.   Latest known visit with results is:   Orders Only on 12/18/2020   Component Date Value Ref Range Status   • Diagnosis 12/18/2020 Comment   Final    NEGATIVE FOR INTRAEPITHELIAL LESION OR MALIGNANCY.   • Specimen adequacy: 12/18/2020 Comment   Final    Comment: Satisfactory for evaluation.  Endocervical and/or squamous metaplastic  cells (endocervical component) are present.     • Clinician Provided ICD-10: 12/18/2020 Comment   Final    Z12.4   • Performed by:  12/18/2020 Comment   Final    Arabella August Cytotechnologist (ASCP)   • . 12/18/2020 .   Final   • Note: 12/18/2020 Comment   Final    Comment: The Pap smear is a screening test designed to aid in the detection of  premalignant and malignant conditions of the uterine cervix.  It is not a  diagnostic procedure and should not be used as the sole means of detecting  cervical cancer.  Both false-positive and false-negative reports do occur.     • Method: 12/18/2020 Comment   Final    Comment: This liquid based ThinPrep(R) pap test was screened with the  use of an image guided system.     • Conv .conv 12/18/2020 Comment   Final    Comment: The HPV DNA reflex criteria were not met with this specimen result  therefore, no HPV testing was performed.         Assessment/Plan   Diagnoses and all orders for this visit:    1. Severe episode of recurrent major depressive disorder, with psychotic features (CMS/HCC) (Primary)  -     ARIPiprazole (ABILIFY) 5 MG tablet; Take 1 tablet by mouth Daily.  Dispense: 30 tablet; Refill: 0  -     buPROPion XL (WELLBUTRIN XL) 300 MG 24 hr tablet; Take 1 tablet by mouth Daily.  Dispense: 30 tablet; Refill: 0  -     FLUoxetine (PROzac) 20 MG capsule; Take one capsule daily with one 40 mg capsule to equal dosage of 60 mg daily  Dispense: 30 capsule; Refill: 0  -     FLUoxetine (PROzac) 40 MG capsule; Take one capsule daily with one 20 mg capsule to equal dosage of 60 mg daily  Dispense: 30 capsule; Refill: 0  -     prazosin (MINIPRESS) 1 MG capsule; Take 1 capsule by mouth Every Night.  Dispense: 30 capsule; Refill: 0    2. Insomnia, unspecified type  -     prazosin (MINIPRESS) 1 MG capsule; Take 1 capsule by mouth Every Night.  Dispense: 30 capsule; Refill: 0    3. Generalized anxiety disorder  -     ARIPiprazole (ABILIFY) 5 MG tablet; Take 1 tablet by mouth Daily.  Dispense: 30 tablet; Refill: 0  -     buPROPion XL (WELLBUTRIN XL) 300 MG 24 hr tablet; Take 1 tablet by mouth Daily.  Dispense:  30 tablet; Refill: 0  -     FLUoxetine (PROzac) 20 MG capsule; Take one capsule daily with one 40 mg capsule to equal dosage of 60 mg daily  Dispense: 30 capsule; Refill: 0  -     FLUoxetine (PROzac) 40 MG capsule; Take one capsule daily with one 20 mg capsule to equal dosage of 60 mg daily  Dispense: 30 capsule; Refill: 0  -     prazosin (MINIPRESS) 1 MG capsule; Take 1 capsule by mouth Every Night.  Dispense: 30 capsule; Refill: 0    4. Medication management    5. Mood disorder (CMS/[facility]) R/O Bipolar Spectrum Disorder    6. Class 3 severe obesity due to excess calories with serious comorbidity and body mass index (BMI) of 50.0 to 59.9 in adult (CMS/[facility])      -Continue fluoxetine 60 mg daily for anxiety and depression. Patient was educated concerning Black Box Warning of increased suicidal thoughts and behaviors with SSRIs   -Increase aripiprazole 2 mg daily for anxiety depression. Lengthy discussion with patient on the possible side effects of antipsychotic medications including increased cholesterol, increased blood sugar, and possibility of weight gain.  Also discussed the need to monitor lab work associated with this.  The risk of muscle movement disorders with this class of medication was also discussed.  -Continue bupropion 300 mg daily for anxiety and depression  -Continue prazosin 1 mg nightly for nightmares  -Encourage patient to continue psychotherapy  -MARCUS reviewed and appropriate. Patient counseled on use of controlled substances.   -The benefits of a healthy diet and exercise were discussed with patient, especially the positive effects they have on mental health. Patient encouraged to consider lifestyle modification regarding  diet and exercise patterns to maximize results of mental health treatment.  -Reviewed previous available documentation  -Reviewed most recent available labs   -Kamryn Gross  reports that she has been smoking. She has a 6.00 pack-year smoking history. She has never used  smokeless tobacco.. I have educated her on the risk of diseases from using tobacco products such as cancer, COPD, heart disease, reproductive problems and low birth weight. I advised her to quit and she is not willing to quit. I spent 4 minutes counseling the patient.    - Began at 9:20 AM and ended at 9:35 AM      Visit Diagnoses:    ICD-10-CM ICD-9-CM   1. Severe episode of recurrent major depressive disorder, with psychotic features (CMS/HCC)  F33.3 296.34   2. Insomnia, unspecified type  G47.00 780.52   3. Generalized anxiety disorder  F41.1 300.02   4. Medication management  Z79.899 V58.69   5. Mood disorder (CMS/HCC) R/O Bipolar Spectrum Disorder  F39 296.90   6. Class 3 severe obesity due to excess calories with serious comorbidity and body mass index (BMI) of 50.0 to 59.9 in adult (CMS/HCC)  E66.01 278.01    Z68.43 V85.43         GOALS:  Short Term Goals: Patient will be compliant with medication, and patient will have no significant medication related side effects.  Patient will be engaged in psychotherapy as indicated.  Patient will report subjective improvement of symptoms.  Long term goals: To stabilize mood and treat/improve subjective symptoms, the patient will stay out of the hospital, the patient will be at an optimal level of functioning, and the patient will take all medications as prescribed.  The patient/guardian verbalized understanding and agreement with goals that were mutually set.      TREATMENT PLAN: Continue supportive psychotherapy efforts and medications as indicated. Pharmacological and Non-Pharmacological treatment options discussed during today's visit. Patient/Guardian acknowledged and verbally consented with current treatment plan and was educated on the importance of compliance with treatment and follow-up appointments.      MEDICATION ISSUES:    Discussed medication options and treatment plan of prescribed medication as well as the risks, benefits, any black box warnings, and side  effects including potential falls, possible impaired driving, and metabolic adversities among others. Patient is agreeable to call the office with any worsening of symptoms or onset of side effects, or if any concerns or questions arise.  The contact information for the office is made available to the patient. Patient is agreeable to call 911 or go to the nearest ER should they begin having any SI/HI, or if any urgent concerns arise. No medication side effects or related complaints today.     MEDS ORDERED DURING VISIT:  New Medications Ordered This Visit   Medications   • ARIPiprazole (ABILIFY) 5 MG tablet     Sig: Take 1 tablet by mouth Daily.     Dispense:  30 tablet     Refill:  0   • buPROPion XL (WELLBUTRIN XL) 300 MG 24 hr tablet     Sig: Take 1 tablet by mouth Daily.     Dispense:  30 tablet     Refill:  0   • FLUoxetine (PROzac) 20 MG capsule     Sig: Take one capsule daily with one 40 mg capsule to equal dosage of 60 mg daily     Dispense:  30 capsule     Refill:  0   • FLUoxetine (PROzac) 40 MG capsule     Sig: Take one capsule daily with one 20 mg capsule to equal dosage of 60 mg daily     Dispense:  30 capsule     Refill:  0   • prazosin (MINIPRESS) 1 MG capsule     Sig: Take 1 capsule by mouth Every Night.     Dispense:  30 capsule     Refill:  0       Return in about 4 weeks (around 3/19/2021), or if symptoms worsen or fail to improve, for phone.         Progress toward goal: Not at goal    Functional Status: Mild impairment     Prognosis: Guarded with Ongoing Treatment          This document has been electronically signed by ELISE Griffiths  February 19, 2021 09:44 EST    Part of this note may be an electronic transcription/translation of spoken language to printed text using the Dragon Dictation System.

## 2021-03-03 ENCOUNTER — OFFICE VISIT (OUTPATIENT)
Dept: PSYCHIATRY | Facility: CLINIC | Age: 26
End: 2021-03-03

## 2021-03-03 DIAGNOSIS — F43.10 POST TRAUMATIC STRESS DISORDER (PTSD): ICD-10-CM

## 2021-03-03 DIAGNOSIS — F33.3 SEVERE EPISODE OF RECURRENT MAJOR DEPRESSIVE DISORDER, WITH PSYCHOTIC FEATURES (HCC): Primary | ICD-10-CM

## 2021-03-03 DIAGNOSIS — F41.1 GENERALIZED ANXIETY DISORDER: ICD-10-CM

## 2021-03-03 DIAGNOSIS — E66.01 CLASS 3 SEVERE OBESITY DUE TO EXCESS CALORIES WITH SERIOUS COMORBIDITY AND BODY MASS INDEX (BMI) OF 50.0 TO 59.9 IN ADULT (HCC): ICD-10-CM

## 2021-03-03 DIAGNOSIS — G47.00 INSOMNIA, UNSPECIFIED TYPE: ICD-10-CM

## 2021-03-03 DIAGNOSIS — F39 MOOD DISORDER (HCC): ICD-10-CM

## 2021-03-03 PROCEDURE — 90837 PSYTX W PT 60 MINUTES: CPT | Performed by: COUNSELOR
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
## 2021-03-11 NOTE — PATIENT INSTRUCTIONS
Managing Post-Traumatic Stress Disorder  If you have been diagnosed with post-traumatic stress disorder (PTSD), you may be relieved that you now know why you have felt or behaved a certain way. Still, you may feel overwhelmed about the treatment ahead. You may also wonder how to get the support you need and how to deal with the condition day-to-day.  If you are living with PTSD, there are ways to help you recover from it and manage your symptoms.  How to manage lifestyle changes  Managing stress  Stress is your body's reaction to life changes and events, both good and bad. Stress can make PTSD worse. Take the following steps to manage stress:  · Talk with your health care provider or a counselor if you would like to learn more about techniques to reduce your stress. He or she may suggest some stress reduction techniques such as:  ? Muscle relaxation exercises.  ? Regular exercise.  ? Meditation, yoga, or other mind-body exercises.  ? Breathing exercises.  ? Listening to quiet music.  ? Spending time outside.  · Maintain a healthy lifestyle. Eat a healthy diet, exercise regularly, get plenty of sleep, and take time to relax.  · Spend time with others. Talk with them about how you are feeling and what kind of support you need. Try not to isolate yourself, even though you may feel like doing that. Isolating yourself can delay your recovery.  · Do activities and hobbies that you enjoy.  · Pace yourself when doing stressful things. Take breaks, and reward yourself when you finish. Make sure that you do not overload your schedule.    Medicines  Your health care provider may suggest certain medicines if he or she feels that they will help to improve your condition. Medicines for depression (antidepressants) or severe loss of contact with reality (antipsychotics) may be used to treat PTSD. Avoid using alcohol and other substances that may prevent your medicines from working properly. It is also important to:  · Talk with  your pharmacist or health care provider about all medicines that you take, their possible side effects, and which medicines are safe to take together.  · Make it your goal to take part in all treatment decisions (shared decision-making). Ask about possible side effects of medicines that your health care provider recommends, and tell him or her how you feel about having those side effects. It is best if shared decision-making with your health care provider is part of your total treatment plan.  If your health care provider prescribes a medicine, you may not notice the full benefits of it for 4-8 weeks. Most people who are treated for PTSD need to take medicine for at least 6-12 months after they feel better. If you are taking medicines as part of your treatment, do not stop taking medicines before you ask your health care provider if it is safe to stop. You may need to have the medicine slowly decreased (tapered) over time to lower the risk of harmful side effects.  Relationships  Many people who have PTSD have difficulty trusting others. Make an effort to:  · Take risks and develop trust with close friends and family members. Developing trust in others can help you feel safe and connect you with emotional support.  · Be open and honest about your feelings.  · Have fun and relax in safe spaces, such as with friends and family.  · Think about going to couples counseling, family education classes, or family therapy. Your loved ones may not always know how to be supportive. Therapy can be helpful for everyone.  How to recognize changes in your condition  Be aware of your symptoms and how often you have them. The following symptoms mean that you need to seek help for your PTSD:  · You feel suspicious and angry.  · You have repeated flashbacks.  · You avoid going out or being with others.  · You have an increasing number of fights with close friends or family members, such as your spouse.  · You have thoughts about  hurting yourself or others.  · You cannot get relief from feelings of depression or anxiety.  Follow these instructions at home:  Lifestyle  · Exercise regularly. Try to do 30 or more minutes of physical activity on most days of the week.  · Try to get 7-9 hours of sleep each night. To help with sleep:  ? Keep your bedroom cool and dark.  ? Avoid screen time before bedtime. This means avoiding use of your TV, computer, tablet, and cell phone.  · Practice self-soothing skills and use them daily.  · Try to have fun and seek humor in your life.  Eating and drinking  · Do not eat a heavy meal during the hour before you go to bed.  · Do not drink alcohol or caffeinated drinks before bed.  · Avoid using alcohol or drugs.  General instructions  · If your PTSD is affecting your marriage or family, seek help from a family therapist.  · Take over-the-counter and prescription medicines only as told by your health care provider.  · Make sure to let all of your health care providers know that you have PTSD. This is especially important if you are having surgery or need to be admitted to the hospital.  · Keep all follow-up visits as told by your health care providers. This is important.  Where to find support  Talking to others  · Explain that PTSD is a mental health problem. It is something that a person can develop after experiencing or seeing a life-threatening event. Tell them that PTSD makes you feel stress like you did during the event.  · Talk to your loved ones about the symptoms you have. Also tell them what things or situations can cause symptoms to start (are triggers for you).  · Assure your loved ones that there are treatments to help PTSD. Discuss possibly seeking family therapy or couples therapy.  · If you are worried or fearful about seeking treatment, ask for support.  · Keep daily contact with at least one trusted friend or family member.  Finances  Not all insurance plans cover mental health care, so it is  important to check with your insurance carrier. If paying for co-pays or counseling services is a problem, search for a local or [facility] mental health care center. Public mental health care services may be offered there at a low cost or no cost when you are not able to see a private health care provider. If you are a , contact a local veterans organization or [facility] for more information.  If you are taking medicine for PTSD, you may be able to get the genericform, which may be less expensive than brand-name medicine. Some makers of prescription medicines also offer help to patients who cannot afford the medicines that they need.  Therapy and support groups  · Find a support group in your community. Often, groups are available for  veterans, trauma victims, and family members or caregivers.  · Look into volunteer opportunities. Taking part in these can help you feel more connected to your community.  · Contact a local organization to find out if you are eligible for a service dog.  Where to find more information  Go to this website to find more information about PTSD, treatment of PTSD, and how to get support:  · National Center for PTSD: www.ptsd.va.gov  Contact a health care provider if:  · Your symptoms get worse or do not get better.  Get help right away if:  · You have thoughts about hurting yourself or others.  If you ever feel like you may hurt yourself or others, or have thoughts about taking your own life, get help right away. You can go to your nearest emergency department or call:  · Your local emergency services (911 in the [place]  · A suicide crisis helpline, such as the National Suicide Prevention Lifeline at 1-799.247.5134. This is open 24-hours a day.  Summary  · If you are living with PTSD, there are ways to help you recover from it and manage your symptoms.  · Find supportive environments and people who understand PTSD. Spend time in those places, and maintain contact with  those people.  · Work with your health care team to create a plan for managing PTSD. The plan should include counseling, stress reduction techniques, and healthy lifestyle habits.  This information is not intended to replace advice given to you by your health care provider. Make sure you discuss any questions you have with your health care provider.  Document Revised: 04/10/2020 Document Reviewed: 04/19/2018  [employer] Patient Education © 2020 [employer]  Generalized Anxiety Disorder, Adult  Generalized anxiety disorder (WILLY) is a mental health disorder. People with this condition constantly worry about everyday events. Unlike normal anxiety, worry related to WILLY is not triggered by a specific event. These worries also do not fade or get better with time. WILLY interferes with life functions, including relationships, work, and school.  WILLY can vary from mild to severe. People with severe WILLY can have intense waves of anxiety with physical symptoms (panic attacks).  What are the causes?  The exact cause of WILLY is not known.  What increases the risk?  This condition is more likely to develop in:  · Women.  · People who have a family history of anxiety disorders.  · People who are very shy.  · People who experience very stressful life events, such as the death of a loved one.  · People who have a very stressful family environment.  What are the signs or symptoms?  People with WILLY often worry excessively about many things in their lives, such as their health and family. They may also be overly concerned about:  · Doing well at work.  · Being on time.  · Natural disasters.  · Friendships.  Physical symptoms of WILLY include:  · Fatigue.  · Muscle tension or having muscle twitches.  · Trembling or feeling shaky.  · Being easily startled.  · Feeling like your heart is pounding or racing.  · Feeling out of breath or like you cannot take a deep breath.  · Having trouble falling asleep or staying asleep.  · Sweating.  · Nausea,  diarrhea, or irritable bowel syndrome (IBS).  · Headaches.  · Trouble concentrating or remembering facts.  · Restlessness.  · Irritability.  How is this diagnosed?  Your health care provider can diagnose WILLY based on your symptoms and medical history. You will also have a physical exam. The health care provider will ask specific questions about your symptoms, including how severe they are, when they started, and if they come and go. Your health care provider may ask you about your use of alcohol or drugs, including prescription medicines. Your health care provider may refer you to a mental health specialist for further evaluation.  Your health care provider will do a thorough examination and may perform additional tests to rule out other possible causes of your symptoms.  To be diagnosed with WILLY, a person must have anxiety that:  · Is out of his or her control.  · Affects several different aspects of his or her life, such as work and relationships.  · Causes distress that makes him or her unable to take part in normal activities.  · Includes at least three physical symptoms of WILLY, such as restlessness, fatigue, trouble concentrating, irritability, muscle tension, or sleep problems.  Before your health care provider can confirm a diagnosis of WILLY, these symptoms must be present more days than they are not, and they must last for six months or longer.  How is this treated?  The following therapies are usually used to treat WILLY:  · Medicine. Antidepressant medicine is usually prescribed for long-term daily control. Antianxiety medicines may be added in severe cases, especially when panic attacks occur.  · Talk therapy (psychotherapy). Certain types of talk therapy can be helpful in treating WILLY by providing support, education, and guidance. Options include:  ? Cognitive behavioral therapy (CBT). People learn coping skills and techniques to ease their anxiety. They learn to identify unrealistic or negative thoughts  and behaviors and to replace them with positive ones.  ? Acceptance and commitment therapy (ACT). This treatment teaches people how to be mindful as a way to cope with unwanted thoughts and feelings.  ? Biofeedback. This process trains you to manage your body's response (physiological response) through breathing techniques and relaxation methods. You will work with a therapist while machines are used to monitor your physical symptoms.  · Stress management techniques. These include yoga, meditation, and exercise.  A mental health specialist can help determine which treatment is best for you. Some people see improvement with one type of therapy. However, other people require a combination of therapies.  Follow these instructions at home:  · Take over-the-counter and prescription medicines only as told by your health care provider.  · Try to maintain a normal routine.  · Try to anticipate stressful situations and allow extra time to manage them.  · Practice any stress management or self-calming techniques as taught by your health care provider.  · Do not punish yourself for setbacks or for not making progress.  · Try to recognize your accomplishments, even if they are small.  · Keep all follow-up visits as told by your health care provider. This is important.  Contact a health care provider if:  · Your symptoms do not get better.  · Your symptoms get worse.  · You have signs of depression, such as:  ? A persistently sad, cranky, or irritable mood.  ? Loss of enjoyment in activities that used to bring you narinder.  ? Change in weight or eating.  ? Changes in sleeping habits.  ? Avoiding friends or family members.  ? Loss of energy for normal tasks.  ? Feelings of guilt or worthlessness.  Get help right away if:  · You have serious thoughts about hurting yourself or others.  If you ever feel like you may hurt yourself or others, or have thoughts about taking your own life, get help right away. You can go to your nearest  emergency department or call:  · Your local emergency services (911 in the [place]  · A suicide crisis helpline, such as the National Suicide Prevention Lifeline at 1-708.745.4892. This is open 24 hours a day.  Summary  · Generalized anxiety disorder (WILLY) is a mental health disorder that involves worry that is not triggered by a specific event.  · People with WILLY often worry excessively about many things in their lives, such as their health and family.  · WILLY may cause physical symptoms such as restlessness, trouble concentrating, sleep problems, frequent sweating, nausea, diarrhea, headaches, and trembling or muscle twitching.  · A mental health specialist can help determine which treatment is best for you. Some people see improvement with one type of therapy. However, other people require a combination of therapies.  This information is not intended to replace advice given to you by your health care provider. Make sure you discuss any questions you have with your health care provider.  Document Revised: 11/30/2018 Document Reviewed: 11/07/2017  [employer] Patient Education © 2020 [employer]  Major Depressive Disorder, Adult  Major depressive disorder (MDD) is a mental health condition. MDD often makes you feel sad, hopeless, or helpless. MDD can also cause symptoms in your body. MDD can affect your:  · Work.  · School.  · Relationships.  · Other normal activities.  MDD can range from mild to very bad. It may occur once (single episode MDD). It can also occur many times (recurrent MDD).  The main symptoms of MDD often include:  · Feeling sad, depressed, or irritable most of the time.  · Loss of interest.  MDD symptoms also include:  · Sleeping too much or too little.  · Eating too much or too little.  · A change in your weight.  · Feeling tired (fatigue) or having low energy.  · Feeling worthless.  · Feeling guilty.  · Trouble making decisions.  · Trouble thinking clearly.  · Thoughts of suicide or harming  others.  · Feeling weak.  · Feeling agitated.  · Keeping yourself from being around other people (isolation).  Follow these instructions at home:  Activity  · Do these things as told by your doctor:  ? Go back to your normal activities.  ? Exercise regularly.  ? Spend time outdoors.  Alcohol  · Talk with your doctor about how alcohol can affect your antidepressant medicines.  · Do not drink alcohol. Or, limit how much alcohol you drink.  ? This means no more than 1 drink a day for nonpregnant women and 2 drinks a day for men. One drink equals one of these:  § 12 oz of beer.  § 5 oz of wine.  § 1½ oz of hard liquor.  General instructions  · Take over-the-counter and prescription medicines only as told by your doctor.  · Eat a healthy diet.  · Get plenty of sleep.  · Find activities that you enjoy. Make time to do them.  · Think about joining a support group. Your doctor may be able to suggest a group for you.  · Keep all follow-up visits as told by your doctor. This is important.  Where to find more information:  · National Elizabeth on Mental Illness:  ? www.fernanda.org  · [place] National [place] of Mental Health:  ? www.nimh.nih.gov  · National Suicide Prevention Lifeline:  ? 1-572.657.6236. This is free, 24-hour help.  Contact a doctor if:  · Your symptoms get worse.  · You have new symptoms.  Get help right away if:  · You self-harm.  · You see, hear, taste, smell, or feel things that are not present (hallucinate).  If you ever feel like you may hurt yourself or others, or have thoughts about taking your own life, get help right away. You can go to your nearest emergency department or call:  · Your local emergency services (911 in the [place]  · A suicide crisis helpline, such as the National Suicide Prevention Lifeline:  ? 1-296.325.6113. This is open 24 hours a day.  This information is not intended to replace advice given to you by your health care provider. Make sure you discuss any questions you have with your  health care provider.  Document Revised: 11/30/2018 Document Reviewed: 09/03/2017  [employer] Patient Education © 2020 [employer]

## 2021-03-29 ENCOUNTER — OFFICE VISIT (OUTPATIENT)
Dept: FAMILY MEDICINE CLINIC | Facility: CLINIC | Age: 26
End: 2021-03-29

## 2021-03-29 VITALS
SYSTOLIC BLOOD PRESSURE: 137 MMHG | RESPIRATION RATE: 20 BRPM | TEMPERATURE: 97.5 F | WEIGHT: 293 LBS | OXYGEN SATURATION: 98 % | HEART RATE: 75 BPM | BODY MASS INDEX: 41.95 KG/M2 | HEIGHT: 70 IN | DIASTOLIC BLOOD PRESSURE: 84 MMHG

## 2021-03-29 DIAGNOSIS — F41.9 ANXIETY: ICD-10-CM

## 2021-03-29 DIAGNOSIS — Z23 ENCOUNTER FOR IMMUNIZATION: ICD-10-CM

## 2021-03-29 DIAGNOSIS — E66.01 CLASS 3 SEVERE OBESITY WITHOUT SERIOUS COMORBIDITY WITH BODY MASS INDEX (BMI) OF 50.0 TO 59.9 IN ADULT, UNSPECIFIED OBESITY TYPE (HCC): ICD-10-CM

## 2021-03-29 DIAGNOSIS — G43.909 MIGRAINE WITHOUT STATUS MIGRAINOSUS, NOT INTRACTABLE, UNSPECIFIED MIGRAINE TYPE: Primary | ICD-10-CM

## 2021-03-29 PROBLEM — G43.009 MIGRAINE WITHOUT AURA AND WITHOUT STATUS MIGRAINOSUS, NOT INTRACTABLE: Status: ACTIVE | Noted: 2021-03-29

## 2021-03-29 PROBLEM — F17.200 SMOKER: Status: ACTIVE | Noted: 2021-03-29

## 2021-03-29 PROCEDURE — 90715 TDAP VACCINE 7 YRS/> IM: CPT | Performed by: FAMILY MEDICINE

## 2021-03-29 PROCEDURE — 90471 IMMUNIZATION ADMIN: CPT | Performed by: FAMILY MEDICINE

## 2021-03-29 PROCEDURE — 99213 OFFICE O/P EST LOW 20 MIN: CPT | Performed by: FAMILY MEDICINE

## 2021-03-29 RX ORDER — SUMATRIPTAN 25 MG/1
TABLET, FILM COATED ORAL
Qty: 20 TABLET | Refills: 0 | Status: SHIPPED | OUTPATIENT
Start: 2021-03-29 | End: 2021-06-29 | Stop reason: SDUPTHER

## 2021-03-29 NOTE — PROGRESS NOTES
"Chief Complaint  Obesity (follow-up) and Anxiety (follow-up)    Subjective          Kamryn Gross presents to [facility]  The patient is here for her 3-month follow-up regarding migraine headache, and anxiety.  The patient states that the medicine for migraine helps whenever she has an acute attack.  With regards to her weight the patient stated that she gained more weight but still refused to see the nutritionist, she stated that she will try hard to watch her diet exercise.  She also stated that she will consider going to a nutritionist if she has not lose weight in 3 months.  The patient states that she is doing well with her medications for anxiety and she is seeing the psychiatrist regularly.  She also went back to smoking after quitting for couple months.      Objective   Vital Signs:   /84 (BP Location: Left arm, Patient Position: Sitting, Cuff Size: Adult)   Pulse 75   Temp 97.5 °F (36.4 °C) (Temporal)   Resp 20   Ht 177.8 cm (70\")   Wt (!) 172 kg (378 lb 12.8 oz)   SpO2 98%   BMI 54.35 kg/m²     Physical Exam  Vitals and nursing note reviewed.   Constitutional:       Appearance: Normal appearance. She is obese.   HENT:      Head: Normocephalic and atraumatic.      Right Ear: Tympanic membrane and ear canal normal.      Left Ear: Tympanic membrane and ear canal normal.      Nose: Nose normal.      Mouth/Throat:      Mouth: Mucous membranes are moist.   Eyes:      Extraocular Movements: Extraocular movements intact.      Pupils: Pupils are equal, round, and reactive to light.   Cardiovascular:      Rate and Rhythm: Normal rate and regular rhythm.      Heart sounds: No murmur heard.     Pulmonary:      Effort: Pulmonary effort is normal.      Breath sounds: Normal breath sounds.   Abdominal:      General: Abdomen is protuberant.      Palpations: Abdomen is soft. There is no mass.      Tenderness: There is no abdominal tenderness. There is no guarding or rebound. "   Musculoskeletal:         General: Normal range of motion.      Cervical back: Normal range of motion and neck supple.   Skin:     Findings: No rash.   Neurological:      General: No focal deficit present.      Mental Status: She is alert and oriented to person, place, and time.      Sensory: Sensation is intact.      Motor: Motor function is intact.      Coordination: Coordination is intact.   Psychiatric:         Mood and Affect: Mood normal.         Speech: Speech normal.         Behavior: Behavior normal. Behavior is cooperative.         Thought Content: Thought content normal.         Cognition and Memory: Cognition normal.         Judgment: Judgment normal.        Result Review :                 Assessment and Plan    Diagnoses and all orders for this visit:    1. Migraine without status migrainosus, not intractable, unspecified migraine type (Primary)  -     SUMAtriptan (Imitrex) 25 MG tablet; Take one tablet at onset of headache. May repeat dose one time in 2 hours if headache not relieved.  Dispense: 20 tablet; Refill: 0    2. Class 3 severe obesity without serious comorbidity with body mass index (BMI) of 50.0 to 59.9 in adult, unspecified obesity type (CMS/HCC)    3. Encounter for immunization  -     Tdap Vaccine Greater Than or Equal To 8yo IM    4. Anxiety    Other orders  -     Cancel: ECG 12 Lead        Follow Up   Return in about 3 months (around 6/29/2021).  Patient was given instructions and counseling regarding her condition or for health maintenance advice. Please see specific information pulled into the AVS if appropriate.     We will update the patient's tetanus vaccine.  Will refill her Imitrex to use as needed for her headache.  Patient counseled on the importance of diet and exercise

## 2021-03-29 NOTE — PATIENT INSTRUCTIONS
Migraine Headache  A migraine headache is a very strong throbbing pain on one side or both sides of your head. This type of headache can also cause other symptoms. It can last from 4 hours to 3 days. Talk with your doctor about what things may bring on (trigger) this condition.  What are the causes?  The exact cause of this condition is not known. This condition may be triggered or caused by:  · Drinking alcohol.  · Smoking.  · Taking medicines, such as:  ? Medicine used to treat chest pain (nitroglycerin).  ? Birth control pills.  ? Estrogen.  ? Some blood pressure medicines.  · Eating or drinking certain products.  · Doing physical activity.  Other things that may trigger a migraine headache include:  · Having a menstrual period.  · Pregnancy.  · Hunger.  · Stress.  · Not getting enough sleep or getting too much sleep.  · Weather changes.  · Tiredness (fatigue).  What increases the risk?  · Being 25-55 years old.  · Being female.  · Having a family history of migraine headaches.  · Being .  · Having depression or anxiety.  · Being very overweight.  What are the signs or symptoms?  · A throbbing pain. This pain may:  ? Happen in any area of the head, such as on one side or both sides.  ? Make it hard to do daily activities.  ? Get worse with physical activity.  ? Get worse around bright lights or loud noises.  · Other symptoms may include:  ? Feeling sick to your stomach (nauseous).  ? Vomiting.  ? Dizziness.  ? Being sensitive to bright lights, loud noises, or smells.  · Before you get a migraine headache, you may get warning signs (an aura). An aura may include:  ? Seeing flashing lights or having blind spots.  ? Seeing bright spots, halos, or zigzag lines.  ? Having tunnel vision or blurred vision.  ? Having numbness or a tingling feeling.  ? Having trouble talking.  ? Having weak muscles.  · Some people have symptoms after a migraine headache (postdromal phase), such as:  ? Tiredness.  ? Trouble  thinking (concentrating).  How is this treated?  · Taking medicines that:  ? Relieve pain.  ? Relieve the feeling of being sick to your stomach.  ? Prevent migraine headaches.  · Treatment may also include:  ? Having acupuncture.  ? Avoiding foods that bring on migraine headaches.  ? Learning ways to control your body functions (biofeedback).  ? Therapy to help you know and deal with negative thoughts (cognitive behavioral therapy).  Follow these instructions at home:  Medicines  · Take over-the-counter and prescription medicines only as told by your doctor.  · Ask your doctor if the medicine prescribed to you:  ? Requires you to avoid driving or using heavy machinery.  ? Can cause trouble pooping (constipation). You may need to take these steps to prevent or treat trouble pooping:  § Drink enough fluid to keep your pee (urine) pale yellow.  § Take over-the-counter or prescription medicines.  § Eat foods that are high in fiber. These include beans, whole grains, and fresh fruits and vegetables.  § Limit foods that are high in fat and sugar. These include fried or sweet foods.  Lifestyle  · Do not drink alcohol.  · Do not use any products that contain nicotine or tobacco, such as cigarettes, e-cigarettes, and chewing tobacco. If you need help quitting, ask your doctor.  · Get at least 8 hours of sleep every night.  · Limit and deal with stress.  General instructions         · Keep a journal to find out what may bring on your migraine headaches. For example, write down:  ? What you eat and drink.  ? How much sleep you get.  ? Any change in what you eat or drink.  ? Any change in your medicines.  · If you have a migraine headache:  ? Avoid things that make your symptoms worse, such as bright lights.  ? It may help to lie down in a dark, quiet room.  ? Do not drive or use heavy machinery.  ? Ask your doctor what activities are safe for you.  · Keep all follow-up visits as told by your doctor. This is important.  Contact  a doctor if:  · You get a migraine headache that is different or worse than others you have had.  · You have more than 15 headache days in one month.  Get help right away if:  · Your migraine headache gets very bad.  · Your migraine headache lasts longer than 72 hours.  · You have a fever.  · You have a stiff neck.  · You have trouble seeing.  · Your muscles feel weak or like you cannot control them.  · You start to lose your balance a lot.  · You start to have trouble walking.  · You pass out (faint).  · You have a seizure.  Summary  · A migraine headache is a very strong throbbing pain on one side or both sides of your head. These headaches can also cause other symptoms.  · This condition may be treated with medicines and changes to your lifestyle.  · Keep a journal to find out what may bring on your migraine headaches.  · Contact a doctor if you get a migraine headache that is different or worse than others you have had.  · Contact your doctor if you have more than 15 headache days in a month.  This information is not intended to replace advice given to you by your health care provider. Make sure you discuss any questions you have with your health care provider.  Document Revised: 04/10/2020 Document Reviewed: 01/30/2020  [employer] Patient Education © 2021 [employer]    Obesity, Adult  Obesity is having too much body fat. Being obese means that your weight is more than what is healthy for you.  BMI is a number that explains how much body fat you have. If you have a BMI of 30 or more, you are obese. Obesity is often caused by eating or drinking more calories than your body uses. Changing your lifestyle can help you lose weight.  Obesity can cause serious health problems, such as:  · Stroke.  · Coronary artery disease (CAD).  · Type 2 diabetes.  · Some types of cancer, including cancers of the colon, breast, uterus, and gallbladder.  · Osteoarthritis.  · High blood pressure (hypertension).  · High  cholesterol.  · Sleep apnea.  · Gallbladder stones.  · Infertility problems.  What are the causes?  · Eating meals each day that are high in calories, sugar, and fat.  · Being born with genes that may make you more likely to become obese.  · Having a medical condition that causes obesity.  · Taking certain medicines.  · Sitting a lot (having a sedentary lifestyle).  · Not getting enough sleep.  · Drinking a lot of drinks that have sugar in them.  What increases the risk?  · Having a family history of obesity.  · Being an  woman.  · Being a  man.  · Living in an area with limited access to:  ? [place], recreation centers, or sidewalks.  ? Healthy food choices, such as grocery stores and farmers' markets.  What are the signs or symptoms?  The main sign is having too much body fat.  How is this treated?  · Treatment for this condition often includes changing your lifestyle. Treatment may include:  ? Changing your diet. This may include making a healthy meal plan.  ? Exercise. This may include activity that causes your heart to beat faster (aerobic exercise) and strength training. Work with your doctor to design a program that works for you.  ? Medicine to help you lose weight. This may be used if you are not able to lose 1 pound a week after 6 weeks of healthy eating and more exercise.  ? Treating conditions that cause the obesity.  ? Surgery. Options may include gastric banding and gastric bypass. This may be done if:  § Other treatments have not helped to improve your condition.  § You have a BMI of 40 or higher.  § You have life-threatening health problems related to obesity.  Follow these instructions at home:  Eating and drinking    · Follow advice from your doctor about what to eat and drink. Your doctor may tell you to:  ? Limit fast food, sweets, and processed snack foods.  ? Choose low-fat options. For example, choose low-fat milk instead of whole milk.  ? Eat 5 or more servings of fruits  or vegetables each day.  ? Eat at home more often. This gives you more control over what you eat.  ? Choose healthy foods when you eat out.  ? Learn to read food labels. This will help you learn how much food is in 1 serving.  ? Keep low-fat snacks available.  ? Avoid drinks that have a lot of sugar in them. These include soda, fruit juice, iced tea with sugar, and flavored milk.  · Drink enough water to keep your pee (urine) pale yellow.  · Do not go on fad diets.  Physical activity  · Exercise often, as told by your doctor. Most adults should get up to 150 minutes of moderate-intensity exercise every week.Ask your doctor:  ? What types of exercise are safe for you.  ? How often you should exercise.  · Warm up and stretch before being active.  · Do slow stretching after being active (cool down).  · Rest between times of being active.  Lifestyle  · Work with your doctor and a food expert (dietitian) to set a weight-loss goal that is best for you.  · Limit your screen time.  · Find ways to reward yourself that do not involve food.  · Do not drink alcohol if:  ? Your doctor tells you not to drink.  ? You are pregnant, may be pregnant, or are planning to become pregnant.  · If you drink alcohol:  ? Limit how much you use to:  § 0-1 drink a day for women.  § 0-2 drinks a day for men.  ? Be aware of how much alcohol is in your drink. In the [place] one drink equals one 12 oz bottle of beer (355 mL), one 5 oz glass of wine (148 mL), or one 1½ oz glass of hard liquor (44 mL).  General instructions  · Keep a weight-loss journal. This can help you keep track of:  ? The food that you eat.  ? How much exercise you get.  · Take over-the-counter and prescription medicines only as told by your doctor.  · Take vitamins and supplements only as told by your doctor.  · Think about joining a support group.  · Keep all follow-up visits as told by your doctor. This is important.  Contact a doctor if:  · You cannot meet your weight loss  goal after you have changed your diet and lifestyle for 6 weeks.  Get help right away if you:  · Are having trouble breathing.  · Are having thoughts of harming yourself.  Summary  · Obesity is having too much body fat.  · Being obese means that your weight is more than what is healthy for you.  · Work with your doctor to set a weight-loss goal.  · Get regular exercise as told by your doctor.  This information is not intended to replace advice given to you by your health care provider. Make sure you discuss any questions you have with your health care provider.  Document Revised: 08/22/2019 Document Reviewed: 08/22/2019  [employer] Patient Education © 2021 [employer]    Preventing Disease Through Immunization  Immunization means developing a lower risk of getting a disease due to improvements in the body's disease-fighting system (immune system). Immunization can happen through:  · Natural exposure to a disease.  · Getting shots (vaccination).  Vaccination involves putting a small amount of germs (vaccines) into the body. This may be done through one or more shots. Some vaccines can be given by mouth or as a nasal spray, instead of a shot. Vaccination helps to prevent:  · Serious diseases such as polio, measles, and whooping cough.  · Common infections, such as the flu.  Vaccination starts at birth. Teens and adults also need vaccines regularly. Talk with your health care provider about the immunization schedule that is best for you. Some vaccines need to be repeated when you are older.  How does immunization prevent disease?  Immunization occurs when the body is exposed to germs that cause a certain disease. The body responds to this exposure by forming certain proteins, called antibodies, to fight those germs. Germs in vaccines are dead or very weak, so they will not make you sick. However, the antibodies that your body makes will stay in your body for a long time. This improves the ability of your immune system  to fight the germs in the future. If you get exposed to the germs again, you may be able to resist them because you have developed immunity against them. This is because your antibodies may destroy the germs before you get sick.  Why should I prevent diseases through vaccination?  Vaccines can protect you from getting diseases that can cause harmful complications and even death. Getting vaccinated also helps to keep other people healthy. If you are vaccinated, you cannot spread disease to others, and that can make the disease become less common. If people keep getting vaccinated, certain diseases may become rare or go away. If people stop getting vaccinated, certain diseases could become more common.  Not everyone can get a vaccine. Very young babies, people who are very sick, or older people may not be able to get vaccines. By getting immunized, you help to protect people who are not able to be vaccinated.  Where to find more information  To learn more about immunization, visit:  · World Health Organization: www.who.int/topics/immunization/en  · Centers for Disease Control and Prevention: www.cdc.gov/vaccines/index.html  Summary  · Immunization occurs when the body is exposed to germs that cause a certain disease and responds by forming proteins (antibodies) to fight those germs.  · Getting vaccines is a safe and good way to develop immunity against specific germs and the diseases that they cause.  · Talk with your health care provider about your immunization schedule, and stay up to date with all of your shots.  This information is not intended to replace advice given to you by your health care provider. Make sure you discuss any questions you have with your health care provider.  Document Revised: 11/19/2020 Document Reviewed: 11/19/2020  [employer] Patient Education © 2021 [employer]

## 2021-04-19 ENCOUNTER — TELEMEDICINE (OUTPATIENT)
Dept: PSYCHIATRY | Facility: CLINIC | Age: 26
End: 2021-04-19

## 2021-04-19 DIAGNOSIS — F39 MOOD DISORDER (HCC): ICD-10-CM

## 2021-04-19 DIAGNOSIS — E66.01 CLASS 3 SEVERE OBESITY DUE TO EXCESS CALORIES WITH SERIOUS COMORBIDITY AND BODY MASS INDEX (BMI) OF 50.0 TO 59.9 IN ADULT (HCC): ICD-10-CM

## 2021-04-19 DIAGNOSIS — Z79.899 MEDICATION MANAGEMENT: ICD-10-CM

## 2021-04-19 DIAGNOSIS — G43.909 MIGRAINE WITHOUT STATUS MIGRAINOSUS, NOT INTRACTABLE, UNSPECIFIED MIGRAINE TYPE: ICD-10-CM

## 2021-04-19 DIAGNOSIS — F43.10 POST TRAUMATIC STRESS DISORDER (PTSD): ICD-10-CM

## 2021-04-19 DIAGNOSIS — F41.1 GENERALIZED ANXIETY DISORDER: ICD-10-CM

## 2021-04-19 DIAGNOSIS — F60.3 BORDERLINE PERSONALITY DISORDER (HCC): ICD-10-CM

## 2021-04-19 DIAGNOSIS — G47.00 INSOMNIA, UNSPECIFIED TYPE: ICD-10-CM

## 2021-04-19 DIAGNOSIS — F33.3 SEVERE EPISODE OF RECURRENT MAJOR DEPRESSIVE DISORDER, WITH PSYCHOTIC FEATURES (HCC): Primary | ICD-10-CM

## 2021-04-19 PROCEDURE — 99214 OFFICE O/P EST MOD 30 MIN: CPT | Performed by: NURSE PRACTITIONER

## 2021-04-19 RX ORDER — FLUOXETINE HYDROCHLORIDE 20 MG/1
CAPSULE ORAL
Qty: 30 CAPSULE | Refills: 0 | Status: SHIPPED | OUTPATIENT
Start: 2021-04-19 | End: 2021-05-18

## 2021-04-19 RX ORDER — FLUOXETINE HYDROCHLORIDE 40 MG/1
CAPSULE ORAL
Qty: 30 CAPSULE | Refills: 0 | Status: SHIPPED | OUTPATIENT
Start: 2021-04-19 | End: 2021-05-18

## 2021-04-19 RX ORDER — ARIPIPRAZOLE 5 MG/1
5 TABLET ORAL DAILY
Qty: 30 TABLET | Refills: 0 | Status: SHIPPED | OUTPATIENT
Start: 2021-04-19 | End: 2021-05-18 | Stop reason: SDUPTHER

## 2021-04-19 RX ORDER — BUPROPION HYDROCHLORIDE 450 MG/1
450 TABLET, FILM COATED, EXTENDED RELEASE ORAL DAILY
Qty: 30 TABLET | Refills: 0 | Status: SHIPPED | OUTPATIENT
Start: 2021-04-19 | End: 2021-05-18 | Stop reason: SDUPTHER

## 2021-04-19 RX ORDER — PRAZOSIN HYDROCHLORIDE 1 MG/1
1 CAPSULE ORAL NIGHTLY
Qty: 30 CAPSULE | Refills: 0 | Status: SHIPPED | OUTPATIENT
Start: 2021-04-19 | End: 2021-05-18 | Stop reason: SDUPTHER

## 2021-04-19 NOTE — PROGRESS NOTES
This provider is located at [facility], [address] [place] [address], [facility], [id number] using a telephone in a secure private environment. The Patient is seen remotely at their home address in [place], using a private telephone.  The patient is unable to be seen through a MyChart Video Visit through [facility] at today's encounter due to technical difficulties, therefore a telephone encounter was conducted. Patient is being evaluated/treated via telehealth by telephone, and stated they are in a secure environment for this session. The patient's condition being diagnosed/treated is appropriate for telemedicine. The provider identified herself as well as her credentials.   The patient, and/or patient's guardian, consent to be seen remotely, and when consent is given they understand that the consent allows for patient identifiable information to be sent to a third party as needed.   They may refuse to be seen remotely at any time. The electronic data is encrypted and password protected, and the patient and/or guardian has been advised of the potential risks to privacy not withstanding such measures.    You have chosen to receive care through a telephone visit. Do you consent to use a telephone visit for your medical care today? Yes  This visit has been rescheduled as a phone visit to comply with patient safety concerns in accordance with CDC recommendations.      Subjective   Kamryn Gross is a 25 y.o. female who presents today for follow up    Chief Complaint:  Depression    History of Present Illness:  Patient presents today for follow up via telephone visit. She reports depression has gotten worse over the past 3 weeks. States she has no energy or motivation to get out of bed. Reports hypersomnia, sleeping around 10-12 hours/night. Reports having thoughts of self harm but denies doing so, states she will play her video game as a way to distract herself.   Reports AVH has stopped, also reports decrease with anxiety. She rates  depression 8/10 with 10 being the worst. Rates anxiety 4/10 with 10 being the worst. She reports appetite is good, states she has been following a low calorie diet over the last week if efforts to lose weight. She denies SI/HI.    The following portions of the patient's history were reviewed and updated as appropriate: allergies, current medications, past family history, past medical history, past social history, past surgical history and problem list.      Past Medical History:  Past Medical History:   Diagnosis Date   • Anxiety    • Bipolar disorder (CMS/HCC)    • Depression    • Headache    • Obesity    • Panic disorder    • PTSD (post-traumatic stress disorder)    • Self-injurious behavior    • Suicide attempt (CMS/[facility])     attempted overdose in 2017       Social History:  Social History     Socioeconomic History   • Marital status: Single     Spouse name: Not on file   • Number of children: Not on file   • Years of education: Not on file   • Highest education level: Not on file   Tobacco Use   • Smoking status: Current Every Day Smoker     Packs/day: 1.50     Years: 4.00     Pack years: 6.00   • Smokeless tobacco: Never Used   • Tobacco comment: pt. reports she is not wanting to quit   Substance and Sexual Activity   • Alcohol use: Not Currently   • Drug use: Not Currently   • Sexual activity: Yes     Partners: Male     Birth control/protection: None       Family History:  Family History   Problem Relation Age of Onset   • Diabetes Mother    • Anxiety disorder Mother    • Depression Mother    • Anxiety disorder Father    • Depression Father    • Anxiety disorder Sister    • Depression Sister    • Depression Maternal Aunt    • Heart attack Paternal Aunt    • Anxiety disorder Paternal Aunt    • Depression Paternal Aunt    • No Known Problems Maternal Uncle    • No Known Problems Paternal Uncle    • Lung disease Maternal Grandfather    • Alzheimer's disease Maternal Grandmother    • Lung cancer Paternal  Grandfather    • Alzheimer's disease Paternal Grandmother    • Alcohol abuse Paternal Grandmother    • Anxiety disorder Sister    • Depression Sister    • Anxiety disorder Paternal Aunt    • Depression Paternal Aunt    • No Known Problems Maternal Uncle    • No Known Problems Paternal Uncle    • Heart attack Paternal Uncle    • Other Paternal Uncle        Past Surgical History:  Past Surgical History:   Procedure Laterality Date   • TONSILLECTOMY         Problem List:  Patient Active Problem List   Diagnosis   • Major depressive disorder, recurrent, moderate (CMS/HCC)   • Anxiety   • Class 3 severe obesity without serious comorbidity with body mass index (BMI) of 50.0 to 59.9 in adult (CMS/HCC)   • Smoker   • Migraine without aura and without status migrainosus, not intractable       Allergy:   No Known Allergies     Current Medications:   Current Outpatient Medications   Medication Sig Dispense Refill   • buPROPion XL (FORFIVO XL) 450 MG 24 hr tablet Take 1 tablet by mouth Daily. 30 tablet 0   • FLUoxetine (PROzac) 20 MG capsule Take one capsule daily with one 40 mg capsule to equal dosage of 60 mg daily 30 capsule 0   • FLUoxetine (PROzac) 40 MG capsule Take one capsule daily with one 20 mg capsule to equal dosage of 60 mg daily 30 capsule 0   • prazosin (MINIPRESS) 1 MG capsule Take 1 capsule by mouth Every Night. 30 capsule 0   • SUMAtriptan (Imitrex) 25 MG tablet Take one tablet at onset of headache. May repeat dose one time in 2 hours if headache not relieved. 20 tablet 0   • ARIPiprazole (ABILIFY) 5 MG tablet Take 1 tablet by mouth Daily. 30 tablet 0     No current facility-administered medications for this visit.       Review of Symptoms:    Review of Systems   Constitutional: Positive for fatigue.   HENT: Negative.    Eyes: Negative.    Respiratory: Negative.    Cardiovascular: Negative.    Gastrointestinal: Negative.    Genitourinary: Negative.    Musculoskeletal: Negative.    Neurological: Negative.     Psychiatric/Behavioral: Positive for depressed mood. Negative for suicidal ideas. The patient is nervous/anxious.          Physical Exam:   There were no vitals taken for this visit.   There is no height or weight on file to calculate BMI.    Due to extenuating circumstances and possible current health risks associated with the patient being present in a clinical setting (with current health restrictions in place in regards to possible COVID 19 transmission/exposure), the patient was seen remotely today via a telephone encounter.  Unable to obtain vital signs due to nature of remote visit.  Height stated at 70 inches.  Weight stated at 378 pounds.         Mental Status Exam:   Hygiene:   Unable to assess due to telephone visit  Cooperation:  Cooperative  Eye Contact:  Unable to assess due to telephone visit  Psychomotor Behavior:  Unable to assess due to telephone visit  Affect:  Appropriate  Mood: depressed  Hopelessness: 7  Speech:  Normal  Thought Process:  Goal directed and Linear  Thought Content:  Normal and Mood congruent  Suicidal:  None  Homicidal:  None  Hallucinations:  None  Delusion:  None  Memory:  Intact  Orientation:  Person, Place, Time and Situation  Reliability:  good  Insight:  Fair  Judgement:  Good  Impulse Control:  Fair  Physical/Medical Issues:  No      PHQ-Score Total:  PHQ-9 Total Score: 16   Patient screened positive for depression based on a PHQ-9 score of 16 on 4/19/2021. Follow-up recommendations include: Prescribed antidepressant medication treatment and Suicide Risk Assessment performed.          Lab Results:   No visits with results within 1 Month(s) from this visit.   Latest known visit with results is:   Orders Only on 12/18/2020   Component Date Value Ref Range Status   • Diagnosis 12/18/2020 Comment   Final    NEGATIVE FOR INTRAEPITHELIAL LESION OR MALIGNANCY.   • Specimen adequacy: 12/18/2020 Comment   Final    Comment: Satisfactory for evaluation.  Endocervical and/or  squamous metaplastic  cells (endocervical component) are present.     • Clinician Provided ICD-10: 12/18/2020 Comment   Final    Z12.4   • Performed by: 12/18/2020 Comment   Final    Arabella August Cytotechnologist (ASCP)   • . 12/18/2020 .   Final   • Note: 12/18/2020 Comment   Final    Comment: The Pap smear is a screening test designed to aid in the detection of  premalignant and malignant conditions of the uterine cervix.  It is not a  diagnostic procedure and should not be used as the sole means of detecting  cervical cancer.  Both false-positive and false-negative reports do occur.     • Method: 12/18/2020 Comment   Final    Comment: This liquid based ThinPrep(R) pap test was screened with the  use of an image guided system.     • Conv .conv 12/18/2020 Comment   Final    Comment: The HPV DNA reflex criteria were not met with this specimen result  therefore, no HPV testing was performed.         Assessment/Plan   Diagnoses and all orders for this visit:    1. Severe episode of recurrent major depressive disorder, with psychotic features (CMS/HCC) (Primary)  -     ARIPiprazole (ABILIFY) 5 MG tablet; Take 1 tablet by mouth Daily.  Dispense: 30 tablet; Refill: 0  -     buPROPion XL (FORFIVO XL) 450 MG 24 hr tablet; Take 1 tablet by mouth Daily.  Dispense: 30 tablet; Refill: 0  -     FLUoxetine (PROzac) 20 MG capsule; Take one capsule daily with one 40 mg capsule to equal dosage of 60 mg daily  Dispense: 30 capsule; Refill: 0  -     FLUoxetine (PROzac) 40 MG capsule; Take one capsule daily with one 20 mg capsule to equal dosage of 60 mg daily  Dispense: 30 capsule; Refill: 0  -     prazosin (MINIPRESS) 1 MG capsule; Take 1 capsule by mouth Every Night.  Dispense: 30 capsule; Refill: 0    2. Generalized anxiety disorder  -     ARIPiprazole (ABILIFY) 5 MG tablet; Take 1 tablet by mouth Daily.  Dispense: 30 tablet; Refill: 0  -     buPROPion XL (FORFIVO XL) 450 MG 24 hr tablet; Take 1 tablet by mouth Daily.  Dispense:  30 tablet; Refill: 0  -     FLUoxetine (PROzac) 20 MG capsule; Take one capsule daily with one 40 mg capsule to equal dosage of 60 mg daily  Dispense: 30 capsule; Refill: 0  -     FLUoxetine (PROzac) 40 MG capsule; Take one capsule daily with one 20 mg capsule to equal dosage of 60 mg daily  Dispense: 30 capsule; Refill: 0  -     prazosin (MINIPRESS) 1 MG capsule; Take 1 capsule by mouth Every Night.  Dispense: 30 capsule; Refill: 0    3. Mood disorder (CMS/[facility]) R/O Bipolar Spectrum Disorder    4. Insomnia, unspecified type  -     prazosin (MINIPRESS) 1 MG capsule; Take 1 capsule by mouth Every Night.  Dispense: 30 capsule; Refill: 0    5. Class 3 severe obesity due to excess calories with serious comorbidity and body mass index (BMI) of 50.0 to 59.9 in adult (CMS/HCC)    6. Post traumatic stress disorder (PTSD)    7. Medication management    8. Borderline personality disorder (CMS/HCC)    9. Migraine without status migrainosus, not intractable, unspecified migraine type      -Increase bupropion  mg daily for depression and anxiety  -Continue Fluoxetine 60 mg daily for anxiety and depression. Patient was educated concerning Black Box Warning of increased suicidal thoughts and behaviors with SSRIs   -Continue aripiprazole 5 mg daily for AVH. Lengthy discussion with patient on the possible side effects of antipsychotic medications including increased cholesterol, increased blood sugar, and possibility of weight gain.  Also discussed the need to monitor lab work associated with this.  The risk of muscle movement disorders with this class of medication was also discussed.  -Continue prazosin 1 mg nightly for nightmares  -Encouraged patient to continue therapy  -MARCUS reviewed and appropriate. Patient counseled on use of controlled substances.   -The benefits of a healthy diet and exercise were discussed with patient, especially the positive effects they have on mental health. Patient encouraged to consider  lifestyle modification regarding  diet and exercise patterns to maximize results of mental health treatment.  -Reviewed previous available documentation  -Reviewed most recent available labs   -Kamryn Gross  reports that she has been smoking. She has a 6.00 pack-year smoking history. She has never used smokeless tobacco.. I have educated her on the risk of diseases from using tobacco products such as cancer, COPD, heart disease, reproductive problems and low birth weight.   I advised her to quit and she is not willing to quit. I spent 4 minutes counseling the patient.         - Began at 8:45 AM and ended at 9:05 AM    Visit Diagnoses:    ICD-10-CM ICD-9-CM   1. Severe episode of recurrent major depressive disorder, with psychotic features (CMS/HCC)  F33.3 296.34   2. Generalized anxiety disorder  F41.1 300.02   3. Mood disorder (CMS/HCC) R/O Bipolar Spectrum Disorder  F39 296.90   4. Insomnia, unspecified type  G47.00 780.52   5. Class 3 severe obesity due to excess calories with serious comorbidity and body mass index (BMI) of 50.0 to 59.9 in adult (CMS/HCC)  E66.01 278.01    Z68.43 V85.43   6. Post traumatic stress disorder (PTSD)  F43.10 309.81   7. Medication management  Z79.899 V58.69   8. Borderline personality disorder (CMS/HCC)  F60.3 301.83   9. Migraine without status migrainosus, not intractable, unspecified migraine type  G43.909 346.90         GOALS:  Short Term Goals: Patient will be compliant with medication, and patient will have no significant medication related side effects.  Patient will be engaged in psychotherapy as indicated.  Patient will report subjective improvement of symptoms.  Long term goals: To stabilize mood and treat/improve subjective symptoms, the patient will stay out of the hospital, the patient will be at an optimal level of functioning, and the patient will take all medications as prescribed.  The patient/guardian verbalized understanding and agreement with goals that were  mutually set.      TREATMENT PLAN: Continue supportive psychotherapy efforts and medications as indicated. Pharmacological and Non-Pharmacological treatment options discussed during today's visit. Patient/Guardian acknowledged and verbally consented with current treatment plan and was educated on the importance of compliance with treatment and follow-up appointments.      MEDICATION ISSUES:    Discussed medication options and treatment plan of prescribed medication as well as the risks, benefits, any black box warnings, and side effects including potential falls, possible impaired driving, and metabolic adversities among others. Patient is agreeable to call the office with any worsening of symptoms or onset of side effects, or if any concerns or questions arise.  The contact information for the office is made available to the patient. Patient is agreeable to call 911 or go to the nearest ER should they begin having any SI/HI, or if any urgent concerns arise. No medication side effects or related complaints today.     MEDS ORDERED DURING VISIT:  New Medications Ordered This Visit   Medications   • ARIPiprazole (ABILIFY) 5 MG tablet     Sig: Take 1 tablet by mouth Daily.     Dispense:  30 tablet     Refill:  0   • buPROPion XL (FORFIVO XL) 450 MG 24 hr tablet     Sig: Take 1 tablet by mouth Daily.     Dispense:  30 tablet     Refill:  0   • FLUoxetine (PROzac) 20 MG capsule     Sig: Take one capsule daily with one 40 mg capsule to equal dosage of 60 mg daily     Dispense:  30 capsule     Refill:  0   • FLUoxetine (PROzac) 40 MG capsule     Sig: Take one capsule daily with one 20 mg capsule to equal dosage of 60 mg daily     Dispense:  30 capsule     Refill:  0   • prazosin (MINIPRESS) 1 MG capsule     Sig: Take 1 capsule by mouth Every Night.     Dispense:  30 capsule     Refill:  0       Return in about 3 weeks (around 5/10/2021), or if symptoms worsen or fail to improve.         Progress toward goal: Not at  goal    Functional Status: No impairment    Prognosis: Guarded with Ongoing Treatment          This document has been electronically signed by LEISE Griffiths  April 19, 2021 09:20 EDT    Part of this note may be an electronic transcription/translation of spoken language to printed text using the Dragon Dictation System.

## 2021-05-18 ENCOUNTER — TELEMEDICINE (OUTPATIENT)
Dept: PSYCHIATRY | Facility: CLINIC | Age: 26
End: 2021-05-18

## 2021-05-18 DIAGNOSIS — Z79.899 MEDICATION MANAGEMENT: ICD-10-CM

## 2021-05-18 DIAGNOSIS — F43.10 POST TRAUMATIC STRESS DISORDER (PTSD): ICD-10-CM

## 2021-05-18 DIAGNOSIS — F41.1 GENERALIZED ANXIETY DISORDER: ICD-10-CM

## 2021-05-18 DIAGNOSIS — E66.01 CLASS 3 SEVERE OBESITY DUE TO EXCESS CALORIES WITH SERIOUS COMORBIDITY AND BODY MASS INDEX (BMI) OF 50.0 TO 59.9 IN ADULT (HCC): ICD-10-CM

## 2021-05-18 DIAGNOSIS — F33.3 SEVERE EPISODE OF RECURRENT MAJOR DEPRESSIVE DISORDER, WITH PSYCHOTIC FEATURES (HCC): Primary | ICD-10-CM

## 2021-05-18 DIAGNOSIS — F39 MOOD DISORDER (HCC): ICD-10-CM

## 2021-05-18 DIAGNOSIS — G47.00 INSOMNIA, UNSPECIFIED TYPE: ICD-10-CM

## 2021-05-18 PROCEDURE — 99214 OFFICE O/P EST MOD 30 MIN: CPT | Performed by: NURSE PRACTITIONER

## 2021-05-18 RX ORDER — PRAZOSIN HYDROCHLORIDE 1 MG/1
1 CAPSULE ORAL NIGHTLY
Qty: 7 CAPSULE | Refills: 0 | Status: SHIPPED | OUTPATIENT
Start: 2021-05-18 | End: 2021-05-25 | Stop reason: SDUPTHER

## 2021-05-18 RX ORDER — BUPROPION HYDROCHLORIDE 450 MG/1
450 TABLET, FILM COATED, EXTENDED RELEASE ORAL DAILY
Qty: 7 TABLET | Refills: 0 | Status: SHIPPED | OUTPATIENT
Start: 2021-05-18 | End: 2021-05-25 | Stop reason: SDUPTHER

## 2021-05-18 RX ORDER — FLUOXETINE HYDROCHLORIDE 40 MG/1
40 CAPSULE ORAL DAILY
Qty: 7 CAPSULE | Refills: 0 | Status: SHIPPED | OUTPATIENT
Start: 2021-05-18 | End: 2021-05-25

## 2021-05-18 RX ORDER — ARIPIPRAZOLE 5 MG/1
7.5 TABLET ORAL DAILY
Qty: 45 TABLET | Refills: 0 | Status: SHIPPED | OUTPATIENT
Start: 2021-05-18 | End: 2021-06-29 | Stop reason: SDUPTHER

## 2021-05-18 NOTE — PROGRESS NOTES
"This provider is located at the [facility] (through [facility]), [address], [place], [address] using a secure [employer] Video Visit through [employer]. Patient is being seen remotely via telehealth at their home address in [place], and stated they are in a secure environment for this session. The patient's condition being diagnosed/treated is appropriate for telemedicine. The provider identified herself as well as her credentials.   The patient, and/or patients guardian, consent to be seen remotely, and when consent is given they understand that the consent allows for patient identifiable information to be sent to a third party as needed.   They may refuse to be seen remotely at any time. The electronic data is encrypted and password protected, and the patient and/or guardian has been advised of the potential risks to privacy not withstanding such measures.    Subjective   Kamryn Gross is a 25 y.o. female who presents today for follow up    Chief Complaint:  Depression    History of Present Illness: Patient presents as follow up via telehealth visit. States \"I've not been feeling the best\". Reports feeling sad, worthless and having suicidal thoughts. She does contribute some of her sadness to her relationship with her boyfriend of 3 years. States for the least year, he has displayed \"anger issues\", she denies any physical abuse, states he can be verbally abusive at times. States her relationship has been strained for the past year and she is not very happy with her relationship.  She reports having suicidal thoughts to cut her wrist within the past week, she adamantly and convincingly denies any today. She lists her mother, her boyfriend and her cats as protective factors. Patient states her mother has agreed to place knives in a secure area. She states \"if I'm not alone I can handle it\". She is agreeable to return in 1 week to office, if thoughts returns, she has " "verbalized agreement with going to nearest ED.  She reports sleeping \"too much\", averaging 10-12 hours/day. Reports appetite has increased, states she feels she is over eating. She denies HI/AVH.     The following portions of the patient's history were reviewed and updated as appropriate: allergies, current medications, past family history, past medical history, past social history, past surgical history and problem list.      Past Medical History:  Past Medical History:   Diagnosis Date   • Anxiety    • Bipolar disorder (CMS/[facility])    • Depression    • Headache    • Obesity    • Panic disorder    • PTSD (post-traumatic stress disorder)    • Self-injurious behavior    • Suicide attempt (CMS/[facility])     attempted overdose in 2017       Social History:  Social History     Socioeconomic History   • Marital status: Single     Spouse name: Not on file   • Number of children: Not on file   • Years of education: Not on file   • Highest education level: Not on file   Tobacco Use   • Smoking status: Current Every Day Smoker     Packs/day: 1.50     Years: 4.00     Pack years: 6.00   • Smokeless tobacco: Never Used   • Tobacco comment: pt. reports she is not wanting to quit   Substance and Sexual Activity   • Alcohol use: Not Currently   • Drug use: Not Currently   • Sexual activity: Yes     Partners: Male     Birth control/protection: None       Family History:  Family History   Problem Relation Age of Onset   • Diabetes Mother    • Anxiety disorder Mother    • Depression Mother    • Anxiety disorder Father    • Depression Father    • Anxiety disorder Sister    • Depression Sister    • Depression Maternal Aunt    • Heart attack Paternal Aunt    • Anxiety disorder Paternal Aunt    • Depression Paternal Aunt    • No Known Problems Maternal Uncle    • No Known Problems Paternal Uncle    • Lung disease Maternal Grandfather    • Alzheimer's disease Maternal Grandmother    • Lung cancer Paternal Grandfather    • Alzheimer's disease " Paternal Grandmother    • Alcohol abuse Paternal Grandmother    • Anxiety disorder Sister    • Depression Sister    • Anxiety disorder Paternal Aunt    • Depression Paternal Aunt    • No Known Problems Maternal Uncle    • No Known Problems Paternal Uncle    • Heart attack Paternal Uncle    • Other Paternal Uncle        Past Surgical History:  Past Surgical History:   Procedure Laterality Date   • TONSILLECTOMY         Problem List:  Patient Active Problem List   Diagnosis   • Major depressive disorder, recurrent, moderate (CMS/HCC)   • Anxiety   • Class 3 severe obesity without serious comorbidity with body mass index (BMI) of 50.0 to 59.9 in adult (CMS/HCC)   • Smoker   • Migraine without aura and without status migrainosus, not intractable        Allergy:   No Known Allergies     Current Medications:   Current Outpatient Medications   Medication Sig Dispense Refill   • ARIPiprazole (ABILIFY) 5 MG tablet Take 1.5 tablets by mouth Daily. 45 tablet 0   • buPROPion XL (FORFIVO XL) 450 MG 24 hr tablet Take 1 tablet by mouth Daily. 7 tablet 0   • FLUoxetine (PROzac) 40 MG capsule Take 1 capsule by mouth Daily. 7 capsule 0   • prazosin (MINIPRESS) 1 MG capsule Take 1 capsule by mouth Every Night. 7 capsule 0   • SUMAtriptan (Imitrex) 25 MG tablet Take one tablet at onset of headache. May repeat dose one time in 2 hours if headache not relieved. 20 tablet 0     No current facility-administered medications for this visit.       Review of Symptoms:    Review of Systems   Constitutional: Positive for fatigue.   HENT: Negative.    Eyes: Negative.    Respiratory: Negative.    Cardiovascular: Negative.    Gastrointestinal: Negative.    Genitourinary: Negative.    Musculoskeletal: Negative.    Skin: Negative.    Neurological: Negative.    Psychiatric/Behavioral: Positive for sleep disturbance, suicidal ideas, depressed mood and stress. The patient is nervous/anxious.          Physical Exam:   There were no vitals taken for this  visit.  There is no height or weight on file to calculate BMI.    Appearance: Obese female, appropriately dressed, appears stated age and in no acute distress  Gait, Station, Strength: Within normal limits    Mental Status Exam:   Hygiene:   good  Cooperation:  Guarded  Eye Contact:  Good  Psychomotor Behavior:  Appropriate  Affect:  Restricted  Mood: sad  Hopelessness: 6  Speech:  Normal  Thought Process:  Goal directed and Linear  Thought Content:  Normal and Mood congruent  Suicidal:  Suicidal thoughts this week, denies thoughts today  Homicidal:  None  Hallucinations:  None  Delusion:  None  Memory:  Intact  Orientation:  Person, Place, Time and Situation  Reliability:  fair  Insight:  Fair  Judgement:  Fair  Impulse Control:  Fair  Physical/Medical Issues:  No      PHQ-Score Total:  PHQ-9 Total Score: 19   Patient screened positive for depression based on a PHQ-9 score of 19 on 5/18/2021. Follow-up recommendations include: Prescribed antidepressant medication treatment and Suicide Risk Assessment performed.          Lab Results:   No visits with results within 1 Month(s) from this visit.   Latest known visit with results is:   Orders Only on 12/18/2020   Component Date Value Ref Range Status   • Diagnosis 12/18/2020 Comment   Final    NEGATIVE FOR INTRAEPITHELIAL LESION OR MALIGNANCY.   • Specimen adequacy: 12/18/2020 Comment   Final    Comment: Satisfactory for evaluation.  Endocervical and/or squamous metaplastic  cells (endocervical component) are present.     • Clinician Provided ICD-10: 12/18/2020 Comment   Final    Z12.4   • Performed by: 12/18/2020 Comment   Final    Arabella August Cytotechnologist (ASCP)   • . 12/18/2020 .   Final   • Note: 12/18/2020 Comment   Final    Comment: The Pap smear is a screening test designed to aid in the detection of  premalignant and malignant conditions of the uterine cervix.  It is not a  diagnostic procedure and should not be used as the sole means of  detecting  cervical cancer.  Both false-positive and false-negative reports do occur.     • Method: 12/18/2020 Comment   Final    Comment: This liquid based ThinPrep(R) pap test was screened with the  use of an image guided system.     • Conv .conv 12/18/2020 Comment   Final    Comment: The HPV DNA reflex criteria were not met with this specimen result  therefore, no HPV testing was performed.         Assessment/Plan   Diagnoses and all orders for this visit:    1. Severe episode of recurrent major depressive disorder, with psychotic features (CMS/HCC) (Primary)  -     ARIPiprazole (ABILIFY) 5 MG tablet; Take 1.5 tablets by mouth Daily.  Dispense: 45 tablet; Refill: 0  -     buPROPion XL (FORFIVO XL) 450 MG 24 hr tablet; Take 1 tablet by mouth Daily.  Dispense: 7 tablet; Refill: 0  -     FLUoxetine (PROzac) 40 MG capsule; Take 1 capsule by mouth Daily.  Dispense: 7 capsule; Refill: 0  -     prazosin (MINIPRESS) 1 MG capsule; Take 1 capsule by mouth Every Night.  Dispense: 7 capsule; Refill: 0    2. Generalized anxiety disorder  -     ARIPiprazole (ABILIFY) 5 MG tablet; Take 1.5 tablets by mouth Daily.  Dispense: 45 tablet; Refill: 0  -     buPROPion XL (FORFIVO XL) 450 MG 24 hr tablet; Take 1 tablet by mouth Daily.  Dispense: 7 tablet; Refill: 0  -     FLUoxetine (PROzac) 40 MG capsule; Take 1 capsule by mouth Daily.  Dispense: 7 capsule; Refill: 0  -     prazosin (MINIPRESS) 1 MG capsule; Take 1 capsule by mouth Every Night.  Dispense: 7 capsule; Refill: 0    3. Mood disorder (CMS/[facility]) R/O Bipolar Spectrum Disorder    4. Insomnia, unspecified type  -     prazosin (MINIPRESS) 1 MG capsule; Take 1 capsule by mouth Every Night.  Dispense: 7 capsule; Refill: 0    5. Class 3 severe obesity due to excess calories with serious comorbidity and body mass index (BMI) of 50.0 to 59.9 in adult (CMS/[facility])    6. Post traumatic stress disorder (PTSD)    7. Medication management      -Begin discontinuation of fluoxetine, decrease  to 40 mg daily for anxiety and depression. Patient was educated concerning Black Box Warning of increased suicidal thoughts and behaviors with SSRIs   -Increase aripiprazole 7.5 mg daily for mood. Lengthy discussion with patient on the possible side effects of antipsychotic medications including increased cholesterol, increased blood sugar, and possibility of weight gain.  Also discussed the need to monitor lab work associated with this.  The risk of muscle movement disorders with this class of medication was also discussed.  -Continue bupropion  mg daily for anxiety and depression  -Continue prazosin 1 mg nightly for nightmares  -Safety plan is developed with patient due to suicidal ideations. Patient lists family and animals as protective factors, her mother is agreeable to place knives away from patient and not to leave her alone. Patient verbalizes agreement to go to nearest ED of suicidal thoughts return. She is agreeable to return in 1 week, 7 days supply of medication sent to pharmacy  -Encouraged patient to continue psychotherapy   -At next visit, we will continue decrease of fluoxetine and begin Prestiq  -Order lab work at next visit   -MARCUS reviewed and appropriate. Patient counseled on use of controlled substances.   -The benefits of a healthy diet and exercise were discussed with patient, especially the positive effects they have on mental health. Patient encouraged to consider lifestyle modification regarding  diet and exercise patterns to maximize results of mental health treatment.  -Reviewed previous available documentation  -Reviewed most recent available labs   -Kamryn Gross  reports that she has been smoking. She has a 6.00 pack-year smoking history. She has never used smokeless tobacco.. I have educated her on the risk of diseases from using tobacco products such as cancer, COPD and heart disease. I advised her to quit and she is not willing to quit. I spent 3  minutes counseling the  patient.           -Session began at 12:35 PM and ended at 12:53 PM    Visit Diagnoses:    ICD-10-CM ICD-9-CM   1. Severe episode of recurrent major depressive disorder, with psychotic features (CMS/HCC)  F33.3 296.34   2. Generalized anxiety disorder  F41.1 300.02   3. Mood disorder (CMS/HCC) R/O Bipolar Spectrum Disorder  F39 296.90   4. Insomnia, unspecified type  G47.00 780.52   5. Class 3 severe obesity due to excess calories with serious comorbidity and body mass index (BMI) of 50.0 to 59.9 in adult (CMS/HCC)  E66.01 278.01    Z68.43 V85.43   6. Post traumatic stress disorder (PTSD)  F43.10 309.81   7. Medication management  Z79.899 V58.69       TREATMENT PLAN/GOALS: Continue supportive psychotherapy efforts and medications as indicated. Treatment and medication options discussed during today's visit. Patient acknowledged and verbally consented to continue with current treatment plan and was educated on the importance of compliance with treatment and follow-up appointments.    MEDICATION ISSUES:    Discussed medication options and treatment plan of prescribed medication as well as the risks, benefits, and side effects including potential falls, possible impaired driving and metabolic adversities among others. Patient is agreeable to call the office with any worsening of symptoms or onset of side effects. Patient is agreeable to call 911 or go to the nearest ER should he/she begin having SI/HI.     MEDS ORDERED DURING VISIT:  New Medications Ordered This Visit   Medications   • ARIPiprazole (ABILIFY) 5 MG tablet     Sig: Take 1.5 tablets by mouth Daily.     Dispense:  45 tablet     Refill:  0   • buPROPion XL (FORFIVO XL) 450 MG 24 hr tablet     Sig: Take 1 tablet by mouth Daily.     Dispense:  7 tablet     Refill:  0   • FLUoxetine (PROzac) 40 MG capsule     Sig: Take 1 capsule by mouth Daily.     Dispense:  7 capsule     Refill:  0   • prazosin (MINIPRESS) 1 MG capsule     Sig: Take 1 capsule by mouth Every  Night.     Dispense:  7 capsule     Refill:  0       Return in about 1 week (around 5/25/2021).               This document has been electronically signed by ELISE Griffiths  May 18, 2021 13:38 EDT    Part of this note may be an electronic transcription/translation of spoken language to printed text using the Dragon Dictation System.

## 2021-05-25 ENCOUNTER — TELEMEDICINE (OUTPATIENT)
Dept: PSYCHIATRY | Facility: CLINIC | Age: 26
End: 2021-05-25

## 2021-05-25 DIAGNOSIS — F33.3 SEVERE EPISODE OF RECURRENT MAJOR DEPRESSIVE DISORDER, WITH PSYCHOTIC FEATURES (HCC): Primary | ICD-10-CM

## 2021-05-25 DIAGNOSIS — G47.00 INSOMNIA, UNSPECIFIED TYPE: ICD-10-CM

## 2021-05-25 DIAGNOSIS — E66.01 CLASS 3 SEVERE OBESITY DUE TO EXCESS CALORIES WITH SERIOUS COMORBIDITY AND BODY MASS INDEX (BMI) OF 50.0 TO 59.9 IN ADULT (HCC): ICD-10-CM

## 2021-05-25 DIAGNOSIS — Z79.899 MEDICATION MANAGEMENT: ICD-10-CM

## 2021-05-25 DIAGNOSIS — F41.1 GENERALIZED ANXIETY DISORDER: ICD-10-CM

## 2021-05-25 DIAGNOSIS — F43.10 POST TRAUMATIC STRESS DISORDER (PTSD): ICD-10-CM

## 2021-05-25 DIAGNOSIS — F39 MOOD DISORDER (HCC): ICD-10-CM

## 2021-05-25 PROCEDURE — 99214 OFFICE O/P EST MOD 30 MIN: CPT | Performed by: NURSE PRACTITIONER

## 2021-05-25 RX ORDER — FLUOXETINE HYDROCHLORIDE 20 MG/1
20 CAPSULE ORAL DAILY
Qty: 5 CAPSULE | Refills: 0 | Status: SHIPPED | OUTPATIENT
Start: 2021-05-25 | End: 2021-06-29 | Stop reason: ALTCHOICE

## 2021-05-25 RX ORDER — BUPROPION HYDROCHLORIDE 450 MG/1
450 TABLET, FILM COATED, EXTENDED RELEASE ORAL DAILY
Qty: 14 TABLET | Refills: 0 | Status: SHIPPED | OUTPATIENT
Start: 2021-05-25 | End: 2021-06-29 | Stop reason: SDUPTHER

## 2021-05-25 RX ORDER — PRAZOSIN HYDROCHLORIDE 1 MG/1
1 CAPSULE ORAL NIGHTLY
Qty: 14 CAPSULE | Refills: 0 | Status: SHIPPED | OUTPATIENT
Start: 2021-05-25 | End: 2021-08-09 | Stop reason: SDUPTHER

## 2021-05-25 RX ORDER — DESVENLAFAXINE SUCCINATE 50 MG/1
50 TABLET, EXTENDED RELEASE ORAL DAILY
Qty: 30 TABLET | Refills: 0 | Status: SHIPPED | OUTPATIENT
Start: 2021-05-25 | End: 2021-06-29 | Stop reason: SDUPTHER

## 2021-05-25 NOTE — PROGRESS NOTES
This provider is located at [facility], [address] [place] [address], [facility], [id number] using a telephone in a secure private environment. The Patient is seen remotely at their home address in [place], using a private telephone.  The patient is unable to be seen through a MyChart Video Visit through [employer] at today's encounter due to technical difficulties, therefore a telephone encounter was conducted. Patient is being evaluated/treated via telehealth by telephone, and stated they are in a secure environment for this session. The patient's condition being diagnosed/treated is appropriate for telemedicine. The provider identified herself as well as her credentials.   The patient, and/or patient's guardian, consent to be seen remotely, and when consent is given they understand that the consent allows for patient identifiable information to be sent to a third party as needed.   They may refuse to be seen remotely at any time. The electronic data is encrypted and password protected, and the patient and/or guardian has been advised of the potential risks to privacy not withstanding such measures.    You have chosen to receive care through a telephone visit. Do you consent to use a telephone visit for your medical care today? Yes  This visit has been rescheduled as a phone visit to comply with patient safety concerns in accordance with CDC recommendations.      Subjective   Kamryn Gross is a 25 y.o. female who presents today for follow up    Chief Complaint:  Depression    History of Present Illness:  Patient presents as follow up via telephone visit due technical difficulties connecting with [employer]. She reports no improvement with depression. Reports suicidal ideations has decreased since last visit. She adamantly denies intent, states she does not allow herself to be alone as this triggers thoughts of self harm. States she is continuing to struggle with lack pf energy and motivation.   States she did have a conversation with  her fiance concerning the way he speaks to her and how it affects her. States his attitude has improved.  She reports sleep is excessive, states she experiences fatigue regardless of the hours she sleeps. Reports appetite is good. She reports suicidal ideations although not daily. She adamantly denies intent. States her mother is supportive and has removed and locked up knives in the home. Patient is agreeable to visit nearest ED if thoughts worsen or increase. She denies HI/AVH.    The following portions of the patient's history were reviewed and updated as appropriate: allergies, current medications, past family history, past medical history, past social history, past surgical history and problem list.      Past Medical History:  Past Medical History:   Diagnosis Date   • Anxiety    • Bipolar disorder (CMS/HCC)    • Depression    • Headache    • Obesity    • Panic disorder    • PTSD (post-traumatic stress disorder)    • Self-injurious behavior    • Suicide attempt (CMS/[facility])     attempted overdose in 2017       Social History:  Social History     Socioeconomic History   • Marital status: Single     Spouse name: Not on file   • Number of children: Not on file   • Years of education: Not on file   • Highest education level: Not on file   Tobacco Use   • Smoking status: Current Every Day Smoker     Packs/day: 1.50     Years: 4.00     Pack years: 6.00   • Smokeless tobacco: Never Used   • Tobacco comment: pt. reports she is not wanting to quit   Substance and Sexual Activity   • Alcohol use: Not Currently   • Drug use: Not Currently   • Sexual activity: Yes     Partners: Male     Birth control/protection: None       Family History:  Family History   Problem Relation Age of Onset   • Diabetes Mother    • Anxiety disorder Mother    • Depression Mother    • Anxiety disorder Father    • Depression Father    • Anxiety disorder Sister    • Depression Sister    • Depression Maternal Aunt    • Heart attack Paternal Aunt    •  Anxiety disorder Paternal Aunt    • Depression Paternal Aunt    • No Known Problems Maternal Uncle    • No Known Problems Paternal Uncle    • Lung disease Maternal Grandfather    • Alzheimer's disease Maternal Grandmother    • Lung cancer Paternal Grandfather    • Alzheimer's disease Paternal Grandmother    • Alcohol abuse Paternal Grandmother    • Anxiety disorder Sister    • Depression Sister    • Anxiety disorder Paternal Aunt    • Depression Paternal Aunt    • No Known Problems Maternal Uncle    • No Known Problems Paternal Uncle    • Heart attack Paternal Uncle    • Other Paternal Uncle        Past Surgical History:  Past Surgical History:   Procedure Laterality Date   • TONSILLECTOMY         Problem List:  Patient Active Problem List   Diagnosis   • Major depressive disorder, recurrent, moderate (CMS/HCC)   • Anxiety   • Class 3 severe obesity without serious comorbidity with body mass index (BMI) of 50.0 to 59.9 in adult (CMS/HCC)   • Smoker   • Migraine without aura and without status migrainosus, not intractable       Allergy:   No Known Allergies     Current Medications:   Current Outpatient Medications   Medication Sig Dispense Refill   • ARIPiprazole (ABILIFY) 5 MG tablet Take 1.5 tablets by mouth Daily. 45 tablet 0   • buPROPion XL (FORFIVO XL) 450 MG 24 hr tablet Take 1 tablet by mouth Daily. 14 tablet 0   • prazosin (MINIPRESS) 1 MG capsule Take 1 capsule by mouth Every Night. 14 capsule 0   • SUMAtriptan (Imitrex) 25 MG tablet Take one tablet at onset of headache. May repeat dose one time in 2 hours if headache not relieved. 20 tablet 0   • desvenlafaxine (PRISTIQ) 50 MG 24 hr tablet Take 1 tablet by mouth Daily. 30 tablet 0   • FLUoxetine (PROzac) 20 MG capsule Take 1 capsule by mouth Daily. Take 1 capsule by mouth daily for 5 days and discontinue 5 capsule 0     No current facility-administered medications for this visit.       Review of Symptoms:    Review of Systems   Constitutional: Positive for  fatigue.   HENT: Negative.    Eyes: Negative.    Respiratory: Negative.    Cardiovascular: Negative.    Gastrointestinal: Negative.    Genitourinary: Negative.    Musculoskeletal: Negative.    Skin: Negative.    Neurological: Negative.    Psychiatric/Behavioral: Positive for dysphoric mood, sleep disturbance, suicidal ideas, depressed mood and stress. The patient is nervous/anxious.          Physical Exam:   There were no vitals taken for this visit.   There is no height or weight on file to calculate BMI.    Due to extenuating circumstances and possible current health risks associated with the patient being present in a clinical setting (with current health restrictions in place in regards to possible COVID 19 transmission/exposure), the patient was seen remotely today via a telephone encounter.  Unable to obtain vital signs due to nature of remote visit.  Height stated at 70 inches.  Weight stated at 378 pounds.         Mental Status Exam:   Hygiene:   Unable to assess due to telephone visit  Cooperation:  Cooperative  Eye Contact:  Unable to assess due to telephone visit  Psychomotor Behavior:  Unable to assess due to telephone visit  Affect:  Blunted  Mood: depressed  Hopelessness: 6  Speech:  Normal  Thought Process:  Goal directed and Linear  Thought Content:  Normal and Mood congruent  Suicidal:  Suicidal Ideation, fleeting  Homicidal:  None  Hallucinations:  None  Delusion:  None  Memory:  Intact  Orientation:  Person, Place, Time and Situation  Reliability:  good  Insight:  Good  Judgement:  Fair  Impulse Control:  Good  Physical/Medical Issues:  No      PHQ-Score Total:  PHQ-9 Total Score: 19   Patient screened positive for depression based on a PHQ-9 score of 19 on 5/25/2021. Follow-up recommendations include: Prescribed antidepressant medication treatment and Suicide Risk Assessment performed.          Lab Results:   No visits with results within 1 Month(s) from this visit.   Latest known visit with  results is:   Orders Only on 12/18/2020   Component Date Value Ref Range Status   • Diagnosis 12/18/2020 Comment   Final    NEGATIVE FOR INTRAEPITHELIAL LESION OR MALIGNANCY.   • Specimen adequacy: 12/18/2020 Comment   Final    Comment: Satisfactory for evaluation.  Endocervical and/or squamous metaplastic  cells (endocervical component) are present.     • Clinician Provided ICD-10: 12/18/2020 Comment   Final    Z12.4   • Performed by: 12/18/2020 Comment   Final    Arabella August, Cytotechnologist (ASCP)   • . 12/18/2020 .   Final   • Note: 12/18/2020 Comment   Final    Comment: The Pap smear is a screening test designed to aid in the detection of  premalignant and malignant conditions of the uterine cervix.  It is not a  diagnostic procedure and should not be used as the sole means of detecting  cervical cancer.  Both false-positive and false-negative reports do occur.     • Method: 12/18/2020 Comment   Final    Comment: This liquid based ThinPrep(R) pap test was screened with the  use of an image guided system.     • Conv .conv 12/18/2020 Comment   Final    Comment: The HPV DNA reflex criteria were not met with this specimen result  therefore, no HPV testing was performed.         Assessment/Plan   Diagnoses and all orders for this visit:    1. Severe episode of recurrent major depressive disorder, with psychotic features (CMS/HCC) (Primary)  -     buPROPion XL (FORFIVO XL) 450 MG 24 hr tablet; Take 1 tablet by mouth Daily.  Dispense: 14 tablet; Refill: 0  -     prazosin (MINIPRESS) 1 MG capsule; Take 1 capsule by mouth Every Night.  Dispense: 14 capsule; Refill: 0  -     FLUoxetine (PROzac) 20 MG capsule; Take 1 capsule by mouth Daily. Take 1 capsule by mouth daily for 5 days and discontinue  Dispense: 5 capsule; Refill: 0  -     desvenlafaxine (PRISTIQ) 50 MG 24 hr tablet; Take 1 tablet by mouth Daily.  Dispense: 30 tablet; Refill: 0    2. Generalized anxiety disorder  -     buPROPion XL (FORFIVO XL) 450 MG 24 hr  tablet; Take 1 tablet by mouth Daily.  Dispense: 14 tablet; Refill: 0  -     prazosin (MINIPRESS) 1 MG capsule; Take 1 capsule by mouth Every Night.  Dispense: 14 capsule; Refill: 0  -     FLUoxetine (PROzac) 20 MG capsule; Take 1 capsule by mouth Daily. Take 1 capsule by mouth daily for 5 days and discontinue  Dispense: 5 capsule; Refill: 0  -     desvenlafaxine (PRISTIQ) 50 MG 24 hr tablet; Take 1 tablet by mouth Daily.  Dispense: 30 tablet; Refill: 0    3. Mood disorder (CMS/[facility]) R/O Bipolar Spectrum Disorder    4. Insomnia, unspecified type  -     prazosin (MINIPRESS) 1 MG capsule; Take 1 capsule by mouth Every Night.  Dispense: 14 capsule; Refill: 0    5. Class 3 severe obesity due to excess calories with serious comorbidity and body mass index (BMI) of 50.0 to 59.9 in adult (CMS/[facility])    6. Post traumatic stress disorder (PTSD)    7. Medication management      -Continue taper of fluoxetine, decrease 20 mg daily for 5 days then discontinue  -Begin desvenlafaxine 50 mg daily for depression and anxiety. Patient was educated concerning Black Box Warning of increased suicidal thoughts and behaviors with SSRIs   -Continue bupropion  mg daily for anxiety and depression  -Continue aripiprazole 7.5 mg daily for anxiety, depression and mood. Lengthy discussion with patient on the possible side effects of antipsychotic medications including increased cholesterol, increased blood sugar, and possibility of weight gain.  Also discussed the need to monitor lab work associated with this.  The risk of muscle movement disorders with this class of medication was also discussed.  -Continue prazosin 1 mg nightly for nightmares  -Encouraged patient to continue psychotherapy  -Discussed safety plan with patient. Mother is agreeable to keep knives locked away. Patient lists boyfriend, mother and cats as safety factors. Patient states she feels safe at home, she verbalizes agreement to go to nearest ED if SI worsens or increases.    -Follow up in 2 weeks to reevaluate  -The benefits of a healthy diet and exercise were discussed with patient, especially the positive effects they have on mental health. Patient encouraged to consider lifestyle modification regarding  diet and exercise patterns to maximize results of mental health treatment.  -Reviewed previous available documentation  -Reviewed most recent available labs   -Kamryn Gross  reports that she has been smoking. She has a 6.00 pack-year smoking history. She has never used smokeless tobacco.. I have educated her on the risk of diseases from using tobacco products such as cancer, COPD, heart disease and reproductive problems. I advised her to quit and she is not willing to quit. I spent 3  minutes counseling the patient.         - Began at 8:04 AM and ended at 8:18 AM    Visit Diagnoses:    ICD-10-CM ICD-9-CM   1. Severe episode of recurrent major depressive disorder, with psychotic features (CMS/HCC)  F33.3 296.34   2. Generalized anxiety disorder  F41.1 300.02   3. Mood disorder (CMS/[facility]) R/O Bipolar Spectrum Disorder  F39 296.90   4. Insomnia, unspecified type  G47.00 780.52   5. Class 3 severe obesity due to excess calories with serious comorbidity and body mass index (BMI) of 50.0 to 59.9 in adult (CMS/[facility])  E66.01 278.01    Z68.43 V85.43   6. Post traumatic stress disorder (PTSD)  F43.10 309.81   7. Medication management  Z79.899 V58.69         GOALS:  Short Term Goals: Patient will be compliant with medication, and patient will have no significant medication related side effects.  Patient will be engaged in psychotherapy as indicated.  Patient will report subjective improvement of symptoms.  Long term goals: To stabilize mood and treat/improve subjective symptoms, the patient will stay out of the hospital, the patient will be at an optimal level of functioning, and the patient will take all medications as prescribed.  The patient/guardian verbalized understanding and agreement  with goals that were mutually set.      TREATMENT PLAN: Continue supportive psychotherapy efforts and medications as indicated.  Pharmacological and Non-Pharmacological treatment options discussed during today's visit. Patient/Guardian acknowledged and verbally consented with current treatment plan and was educated on the importance of compliance with treatment and follow-up appointments.      MEDICATION ISSUES:    Discussed medication options and treatment plan of prescribed medication as well as the risks, benefits, any black box warnings, and side effects including potential falls, possible impaired driving, and metabolic adversities among others. Patient is agreeable to call the office with any worsening of symptoms or onset of side effects, or if any concerns or questions arise.  The contact information for the office is made available to the patient. Patient is agreeable to call 911 or go to the nearest ER should they begin having any SI/HI, or if any urgent concerns arise. No medication side effects or related complaints today.     MEDS ORDERED DURING VISIT:  New Medications Ordered This Visit   Medications   • buPROPion XL (FORFIVO XL) 450 MG 24 hr tablet     Sig: Take 1 tablet by mouth Daily.     Dispense:  14 tablet     Refill:  0   • prazosin (MINIPRESS) 1 MG capsule     Sig: Take 1 capsule by mouth Every Night.     Dispense:  14 capsule     Refill:  0   • FLUoxetine (PROzac) 20 MG capsule     Sig: Take 1 capsule by mouth Daily. Take 1 capsule by mouth daily for 5 days and discontinue     Dispense:  5 capsule     Refill:  0   • desvenlafaxine (PRISTIQ) 50 MG 24 hr tablet     Sig: Take 1 tablet by mouth Daily.     Dispense:  30 tablet     Refill:  0       Return in about 2 weeks (around 6/8/2021), or if symptoms worsen or fail to improve.         Progress toward goal: Not at goal    Functional Status: No impairment    Prognosis: Guarded with Ongoing Treatment          This document has been electronically  signed by ELISE Griffiths  May 25, 2021 09:52 EDT    Part of this note may be an electronic transcription/translation of spoken language to printed text using the Dragon Dictation System.

## 2021-06-29 ENCOUNTER — OFFICE VISIT (OUTPATIENT)
Dept: FAMILY MEDICINE CLINIC | Facility: CLINIC | Age: 26
End: 2021-06-29

## 2021-06-29 VITALS
HEART RATE: 92 BPM | DIASTOLIC BLOOD PRESSURE: 72 MMHG | BODY MASS INDEX: 41.95 KG/M2 | OXYGEN SATURATION: 98 % | WEIGHT: 293 LBS | RESPIRATION RATE: 20 BRPM | SYSTOLIC BLOOD PRESSURE: 118 MMHG | HEIGHT: 70 IN | TEMPERATURE: 97.9 F

## 2021-06-29 DIAGNOSIS — G43.909 MIGRAINE WITHOUT STATUS MIGRAINOSUS, NOT INTRACTABLE, UNSPECIFIED MIGRAINE TYPE: Primary | ICD-10-CM

## 2021-06-29 DIAGNOSIS — F33.3 SEVERE EPISODE OF RECURRENT MAJOR DEPRESSIVE DISORDER, WITH PSYCHOTIC FEATURES (HCC): ICD-10-CM

## 2021-06-29 DIAGNOSIS — F33.1 MAJOR DEPRESSIVE DISORDER, RECURRENT, MODERATE (HCC): ICD-10-CM

## 2021-06-29 DIAGNOSIS — F41.1 GENERALIZED ANXIETY DISORDER: ICD-10-CM

## 2021-06-29 PROBLEM — F41.9 ANXIETY: Status: RESOLVED | Noted: 2020-10-15 | Resolved: 2021-06-29

## 2021-06-29 PROCEDURE — 99213 OFFICE O/P EST LOW 20 MIN: CPT | Performed by: FAMILY MEDICINE

## 2021-06-29 RX ORDER — ARIPIPRAZOLE 10 MG/1
10 TABLET ORAL DAILY
Qty: 30 TABLET | Refills: 0 | Status: SHIPPED | OUTPATIENT
Start: 2021-06-29 | End: 2021-07-29

## 2021-06-29 RX ORDER — DESVENLAFAXINE SUCCINATE 50 MG/1
50 TABLET, EXTENDED RELEASE ORAL DAILY
Qty: 30 TABLET | Refills: 0 | Status: SHIPPED | OUTPATIENT
Start: 2021-06-29 | End: 2021-08-09 | Stop reason: SDUPTHER

## 2021-06-29 RX ORDER — BUPROPION HYDROCHLORIDE 450 MG/1
450 TABLET, FILM COATED, EXTENDED RELEASE ORAL DAILY
Qty: 30 TABLET | Refills: 0 | Status: SHIPPED | OUTPATIENT
Start: 2021-06-29 | End: 2021-08-09 | Stop reason: SDUPTHER

## 2021-06-29 RX ORDER — SUMATRIPTAN 25 MG/1
TABLET, FILM COATED ORAL
Qty: 20 TABLET | Refills: 0 | Status: SHIPPED | OUTPATIENT
Start: 2021-06-29 | End: 2022-01-12 | Stop reason: SDUPTHER

## 2021-06-29 NOTE — PROGRESS NOTES
"Chief Complaint  Follow-up (med RF R/T migraines, needing referral to psychiatrist)    Subjective          Kamryn Gross presents to [facility]  The patient is here for follow up on her Migraine headaches, she states that her imitrex helps break the headaches before it gets worse. The patient was not able to follow up with the psychiatrist because she went on vacation and they discharge her because she missed her appointment. She is running out of her psych meds and needing a referral to a psychiatrist here in town.      Objective   Vital Signs:   /72   Pulse 92   Temp 97.9 °F (36.6 °C) (Temporal)   Resp 20   Ht 177.8 cm (70\")   Wt (!) 171 kg (378 lb)   SpO2 98%   BMI 54.24 kg/m²     Physical Exam  Vitals and nursing note reviewed.   Constitutional:       Appearance: Normal appearance. She is obese.   HENT:      Head: Normocephalic and atraumatic.      Right Ear: Tympanic membrane and ear canal normal.      Left Ear: Tympanic membrane and ear canal normal.      Nose: Nose normal.      Mouth/Throat:      Mouth: Mucous membranes are moist.   Eyes:      Extraocular Movements: Extraocular movements intact.      Pupils: Pupils are equal, round, and reactive to light.   Cardiovascular:      Rate and Rhythm: Normal rate and regular rhythm.  No extrasystoles are present.     Heart sounds: Normal heart sounds. No murmur heard.     Pulmonary:      Effort: Pulmonary effort is normal.      Breath sounds: Normal breath sounds. No decreased breath sounds, wheezing or rhonchi.   Abdominal:      General: Abdomen is protuberant.      Palpations: Abdomen is soft. There is no mass.      Tenderness: There is no abdominal tenderness. There is no right CVA tenderness, left CVA tenderness, guarding or rebound.   Musculoskeletal:         General: Normal range of motion.      Cervical back: Normal range of motion and neck supple.      Right lower leg: No edema.      Left lower leg: No edema. "   Skin:     Findings: No rash.   Neurological:      General: No focal deficit present.      Mental Status: She is alert and oriented to person, place, and time.      Cranial Nerves: Cranial nerves are intact.      Sensory: Sensation is intact.      Motor: Motor function is intact.      Coordination: Coordination is intact.      Gait: Gait is intact.      Deep Tendon Reflexes: Reflexes are normal and symmetric.   Psychiatric:         Attention and Perception: Attention and perception normal.         Mood and Affect: Mood and affect normal.         Speech: Speech normal.         Behavior: Behavior normal. Behavior is cooperative.         Thought Content: Thought content normal.         Cognition and Memory: Cognition normal.         Judgment: Judgment normal.        Result Review :                 Assessment and Plan    Diagnoses and all orders for this visit:    1. Migraine without status migrainosus, not intractable, unspecified migraine type (Primary)  -     SUMAtriptan (Imitrex) 25 MG tablet; Take one tablet at onset of headache. May repeat dose one time in 2 hours if headache not relieved.  Dispense: 20 tablet; Refill: 0    2. Major depressive disorder, recurrent, moderate (CMS/HCC)  -     Ambulatory Referral to Psychiatry    3. Severe episode of recurrent major depressive disorder, with psychotic features (CMS/HCC)  -     desvenlafaxine (PRISTIQ) 50 MG 24 hr tablet; Take 1 tablet by mouth Daily.  Dispense: 30 tablet; Refill: 0  -     buPROPion XL (FORFIVO XL) 450 MG 24 hr tablet; Take 1 tablet by mouth Daily for 30 days.  Dispense: 30 tablet; Refill: 0  -     ARIPiprazole (ABILIFY) 10 MG tablet; Take 1 tablet by mouth Daily for 30 days.  Dispense: 30 tablet; Refill: 0    4. Generalized anxiety disorder  -     Ambulatory Referral to Psychiatry  -     desvenlafaxine (PRISTIQ) 50 MG 24 hr tablet; Take 1 tablet by mouth Daily.  Dispense: 30 tablet; Refill: 0  -     buPROPion XL (FORFIVO XL) 450 MG 24 hr tablet; Take  1 tablet by mouth Daily for 30 days.  Dispense: 30 tablet; Refill: 0  -     ARIPiprazole (ABILIFY) 10 MG tablet; Take 1 tablet by mouth Daily for 30 days.  Dispense: 30 tablet; Refill: 0        Follow Up   Return in about 1 month (around 7/29/2021).  Patient was given instructions and counseling regarding her condition or for health maintenance advice. Please see specific information pulled into the AVS if appropriate.     Will refill her Imitrex. Will also refill her Pristiq, Bupropion and Ariprazole for this month and will refer her to a different Psychiatrist in town. The patient was counseled on the importance of diet, exercise and compliance.            This document has been electronically signed by Osmany Mathur MD  June 29, 2021 16:57 EDT

## 2021-06-29 NOTE — PATIENT INSTRUCTIONS
Chronic Migraine Headache  A migraine headache is throbbing pain that is usually on one side of the head. Migraines that keep coming back are called recurring migraines. A migraine is called a chronic migraine if it happens at least 15 days in a month for more than 3 months.  Talk with your doctor about what things may bring on (trigger) your migraines.  What are the causes?  The exact cause of this condition is not known. A migraine may be caused when nerves in the brain become irritated and release chemicals that cause irritation and swelling (inflammation) of blood vessels. The irritation and swelling of the blood vessels causes pain.  Migraines may be brought on or caused by:  · Smoking.  · Foods and drinks, such as:  ? Cheese.  ? Chocolate.  ? Alcohol.  ? Caffeine.  · Certain substances in some foods or drinks.  · Some medicines.  Other things that may bring on a migraine include:  · Periods, for women.  · Stress.  · Not enough sleep or too much sleep.  · Feeling very tired.  · Bright lights or loud noises.  · Smells  · Weather changes and being at high altitude.  What increases the risk?  The following factors may make you more likely to have chronic migraine:  · Having migraines or family members who have them.  · Being very sad (depressed) or feeling worried or nervous (anxious).  · Taking a lot of pain medicine.  · Having problems sleeping.  · Having heart disease, diabetes, or being very overweight (obese).  What are the signs or symptoms?  Symptoms of this condition include:  · Pain that feels like it throbs.  · Pain that is usually only on one side of the head. In some cases, the pain may be on both sides of the head or around the head or neck.  · Very bad pain that keeps you from doing daily activities.  · Pain that gets worse with activity.  · Feeling like you may vomit (feeling nauseous) or vomiting.  · Pain when you are around bright lights, loud noises, or activity.  · Being sensitive to bright  lights, loud noises, or smells.  · Feeling dizzy.  How is this treated?  This condition is treated with:  · Medicines. These help to:  ? Lessen pain and the feeling like you may vomit.  ? Prevent migraines.  · Changes to your diet or sleep.  · Therapy. This might include:  ? Relaxation training.  ? Biofeedback. This is a treatment that teaches you to relax, use your brain to lower your heart rate, and control your breathing.  ? Cognitive behavioral therapy (CBT). This therapy helps you set goals and follow up on the changes that you make.  · Acupuncture.  · Using a device that provides electrical stimulation to your nerves, which can help take away pain.  · Surgery, if the other treatments do not work.  Follow these instructions at home:  Medicines  · Take over-the-counter and prescription medicines only as told by your doctor.  · Ask your doctor if the medicine prescribed to you requires you to avoid driving or using machinery.  Lifestyle    · Do not use any products that contain nicotine or tobacco, such as cigarettes, e-cigarettes, and chewing tobacco. If you need help quitting, ask your doctor.  · Do not drink alcohol.  · Get 7-9 hours of sleep each night.  · Lower the stress in your life. Ask your doctor about ways to do this.  · Stay at a healthy weight. Talk with your doctor if you need help losing weight.  · Get regular exercise.  General instructions    · Keep a journal to find out if certain things bring on migraines. For example, write down:  ? What you eat and drink.  ? How much sleep you get.  ? Any change to your diet or medicines.  · Lie down in a dark, quiet room when you have a migraine.  · Try placing a cool towel over your head when you have a migraine.  · Keep lights dim if bright lights bother you or make your migraines worse.  · Keep all follow-up visits as told by your doctor. This is important.  Where to find more information  · Coalition for Headache and Migraine Patients (CHAMP):  "headachemigraine.org  · American Migraine Foundation: americanmigrainefoundation.org  · National Headache Foundation: headaches.org  Contact a doctor if:  · Medicine does not help your migraine.  · Your pain keeps coming back.  Get help right away if:  · Your migraine becomes really bad and medicine does not help.  · You have a stiff neck and fever.  · You have trouble seeing.  · Your muscles are weak or you lose control of them.  · You lose your balance or have trouble walking.  · You feel like you will faint or you faint.  · You start having sudden, very bad headaches.  · You have a seizure.  Summary  · A migraine headache is very bad, throbbing pain that is usually on one side of the head.  · A chronic migraine is a migraine that happens 15 days in a month for more than 3 months.  · Talk with your doctor about what things may bring on your migraines.  · Lie down in a dark, quiet room when you have a migraine.  · Keep a journal. This can help you find out if certain things make you have migraines.  This information is not intended to replace advice given to you by your health care provider. Make sure you discuss any questions you have with your health care provider.  Document Revised: 02/03/2021 Document Reviewed: 02/03/2021  [employer] Patient Education © 2021 [employer]    [email or web address].NIH.[email or web address]">   Generalized Anxiety Disorder, Adult  Generalized anxiety disorder (WILLY) is a mental health condition. Unlike normal worries, anxiety related to WILLY is not triggered by a specific event. These worries do not fade or get better with time. WILLY interferes with relationships, work, and school.  WILLY symptoms can vary from mild to severe. People with severe WILLY can have intense waves of anxiety with physical symptoms that are similar to panic attacks.  What are the causes?  The exact cause of WILLY is not known, but the following are believed to have an impact:  · Differences in natural brain chemicals.  · Genes passed down " from parents to children.  · Differences in the way threats are perceived.  · Development during childhood.  · Personality.  What increases the risk?  The following factors may make you more likely to develop this condition:  · Being female.  · Having a family history of anxiety disorders.  · Being very shy.  · Experiencing very stressful life events, such as the death of a loved one.  · Having a very stressful family environment.  What are the signs or symptoms?  People with WILLY often worry excessively about many things in their lives, such as their health and family. Symptoms may also include:  · Mental and emotional symptoms:  ? Worrying excessively about natural disasters.  ? Fear of being late.  ? Difficulty concentrating.  ? Fears that others are judging your performance.  · Physical symptoms:  ? Fatigue.  ? Headaches, muscle tension, muscle twitches, trembling, or feeling shaky.  ? Feeling like your heart is pounding or beating very fast.  ? Feeling out of breath or like you cannot take a deep breath.  ? Having trouble falling asleep or staying asleep, or experiencing restlessness.  ? Sweating.  ? Nausea, diarrhea, or irritable bowel syndrome (IBS).  · Behavioral symptoms:  ? Experiencing erratic moods or irritability.  ? Avoidance of new situations.  ? Avoidance of people.  ? Extreme difficulty making decisions.  How is this diagnosed?  This condition is diagnosed based on your symptoms and medical history. You will also have a physical exam. Your health care provider may perform tests to rule out other possible causes of your symptoms.  To be diagnosed with WILLY, a person must have anxiety that:  · Is out of his or her control.  · Affects several different aspects of his or her life, such as work and relationships.  · Causes distress that makes him or her unable to take part in normal activities.  · Includes at least three symptoms of WILLY, such as restlessness, fatigue, trouble concentrating, irritability,  muscle tension, or sleep problems.  Before your health care provider can confirm a diagnosis of WILLY, these symptoms must be present more days than they are not, and they must last for 6 months or longer.  How is this treated?  This condition may be treated with:  · Medicine. Antidepressant medicine is usually prescribed for long-term daily control. Anti-anxiety medicines may be added in severe cases, especially when panic attacks occur.  · Talk therapy (psychotherapy). Certain types of talk therapy can be helpful in treating WILLY by providing support, education, and guidance. Options include:  ? Cognitive behavioral therapy (CBT). People learn coping skills and self-calming techniques to ease their physical symptoms. They learn to identify unrealistic thoughts and behaviors and to replace them with more appropriate thoughts and behaviors.  ? Acceptance and commitment therapy (ACT). This treatment teaches people how to be mindful as a way to cope with unwanted thoughts and feelings.  ? Biofeedback. This process trains you to manage your body's response (physiological response) through breathing techniques and relaxation methods. You will work with a therapist while machines are used to monitor your physical symptoms.  · Stress management techniques. These include yoga, meditation, and exercise.  A mental health specialist can help determine which treatment is best for you. Some people see improvement with one type of therapy. However, other people require a combination of therapies.  Follow these instructions at home:  Lifestyle  · Maintain a consistent routine and schedule.  · Anticipate stressful situations. Create a plan, and allow extra time to work with your plan.  · Practice stress management or self-calming techniques that you have learned from your therapist or your health care provider.  General instructions  · Take over-the-counter and prescription medicines only as told by your health care  provider.  · Understand that you are likely to have setbacks. Accept this and be kind to yourself as you persist to take better care of yourself.  · Recognize and accept your accomplishments, even if you  them as small.  · Keep all follow-up visits as told by your health care provider. This is important.  Contact a health care provider if:  · Your symptoms do not get better.  · Your symptoms get worse.  · You have signs of depression, such as:  ? A persistently sad or irritable mood.  ? Loss of enjoyment in activities that used to bring you narinder.  ? Change in weight or eating.  ? Changes in sleeping habits.  ? Avoiding friends or family members.  ? Loss of energy for normal tasks.  ? Feelings of guilt or worthlessness.  Get help right away if:  · You have serious thoughts about hurting yourself or others.  If you ever feel like you may hurt yourself or others, or have thoughts about taking your own life, get help right away. Go to your nearest emergency department or:  · Call your local emergency services (911 in the [place]  · Call a suicide crisis helpline, such as the National Suicide Prevention Lifeline at 1-869.652.7008. This is open 24 hours a day in the [place]  · Text the Crisis Text Line at 025808 (in the [place]  Summary  · Generalized anxiety disorder (WILLY) is a mental health condition that involves worry that is not triggered by a specific event.  · People with WILLY often worry excessively about many things in their lives, such as their health and family.  · WILLY may cause symptoms such as restlessness, trouble concentrating, sleep problems, frequent sweating, nausea, diarrhea, headaches, and trembling or muscle twitching.  · A mental health specialist can help determine which treatment is best for you. Some people see improvement with one type of therapy. However, other people require a combination of therapies.  This information is not intended to replace advice given to you by your health care provider.  Make sure you discuss any questions you have with your health care provider.  Document Revised: 10/07/2020 Document Reviewed: 10/07/2020  [employer] Patient Education ©  [employer]  Depression and Anxiety  You will learn about mood disorders, how these conditions can occur in people with heart disease, and why it is important to treat these conditions.  To view the content, go to this web address:  [email or web address]    This video will  on: 2023. If you need access to this video following this date, please reach out to the healthcare provider who assigned it to you.  This information is not intended to replace advice given to you by your health care provider. Make sure you discuss any questions you have with your health care provider.  [employer] Patient Education ©  [employer]

## 2021-08-09 ENCOUNTER — TELEMEDICINE (OUTPATIENT)
Dept: PSYCHIATRY | Facility: CLINIC | Age: 26
End: 2021-08-09

## 2021-08-09 DIAGNOSIS — G47.9 SLEEPING DIFFICULTIES: ICD-10-CM

## 2021-08-09 DIAGNOSIS — F41.9 ANXIETY DISORDER, UNSPECIFIED TYPE: Chronic | ICD-10-CM

## 2021-08-09 DIAGNOSIS — F33.1 MAJOR DEPRESSIVE DISORDER, RECURRENT EPISODE, MODERATE (HCC): Primary | Chronic | ICD-10-CM

## 2021-08-09 DIAGNOSIS — F51.5 NIGHTMARES: ICD-10-CM

## 2021-08-09 DIAGNOSIS — Z86.59 HISTORY OF BORDERLINE PERSONALITY DISORDER: ICD-10-CM

## 2021-08-09 DIAGNOSIS — F43.10 POST TRAUMATIC STRESS DISORDER (PTSD): Chronic | ICD-10-CM

## 2021-08-09 PROCEDURE — 90792 PSYCH DIAG EVAL W/MED SRVCS: CPT | Performed by: NURSE PRACTITIONER

## 2021-08-09 RX ORDER — PRAZOSIN HYDROCHLORIDE 1 MG/1
1 CAPSULE ORAL NIGHTLY
Qty: 30 CAPSULE | Refills: 0 | Status: SHIPPED | OUTPATIENT
Start: 2021-08-09 | End: 2022-02-22 | Stop reason: SDUPTHER

## 2021-08-09 RX ORDER — ARIPIPRAZOLE 10 MG/1
10 TABLET ORAL DAILY
Qty: 30 TABLET | Refills: 0 | Status: SHIPPED | OUTPATIENT
Start: 2021-08-09 | End: 2022-01-12 | Stop reason: SDUPTHER

## 2021-08-09 RX ORDER — BUPROPION HYDROCHLORIDE 450 MG/1
450 TABLET, FILM COATED, EXTENDED RELEASE ORAL DAILY
Qty: 30 TABLET | Refills: 0 | Status: SHIPPED | OUTPATIENT
Start: 2021-08-09 | End: 2021-12-17 | Stop reason: SDUPTHER

## 2021-08-09 RX ORDER — DESVENLAFAXINE 100 MG/1
100 TABLET, EXTENDED RELEASE ORAL DAILY
Qty: 30 TABLET | Refills: 0 | Status: SHIPPED | OUTPATIENT
Start: 2021-08-09 | End: 2022-02-22 | Stop reason: SDUPTHER

## 2021-08-09 NOTE — PROGRESS NOTES
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 risks, any off label use of medication, any black box warnings, and any side effects in their own words. The patient verbalized understanding and agreed to comply with the safety plan discussed in their own words.  Patient given the number to the office. Number also available to the 24- hour suicide hotline.        MEDS ORDERED DURING VISIT:  New Medications Ordered This Visit   Medications   • buPROPion XL (FORFIVO XL) 450 MG 24 hr tablet     Sig: Take 1 tablet by mouth Daily for 30 days.     Dispense:  30 tablet     Refill:  0   • desvenlafaxine (PRISTIQ) 100 MG 24 hr tablet     Sig: Take 1 tablet by mouth Daily.     Dispense:  30 tablet     Refill:  0   • prazosin (MINIPRESS) 1 MG capsule     Sig: Take 1 capsule by mouth Every Night.     Dispense:  30 capsule     Refill:  0   • ARIPiprazole (Abilify) 10 MG tablet     Sig: Take 1 tablet by mouth Daily.     Dispense:  30 tablet     Refill:  0       Return in about 4 weeks (around 9/6/2021), or if symptoms worsen or fail to improve, for Next scheduled follow up and Recheck.         Functional Status: Moderate impairment     Prognosis: Fair with Ongoing Treatment             This document has been electronically signed by ELISE Dye  August 9, 2021 16:04 EDT      Please note that portions of this note were completed with a voice recognition program.  Efforts were made to edit dictation, but occasionally words are mistranscribed.

## 2021-08-13 ENCOUNTER — TELEMEDICINE (OUTPATIENT)
Dept: PSYCHIATRY | Facility: CLINIC | Age: 26
End: 2021-08-13

## 2021-08-13 DIAGNOSIS — F33.1 MAJOR DEPRESSIVE DISORDER, RECURRENT EPISODE, MODERATE (HCC): Primary | ICD-10-CM

## 2021-08-13 DIAGNOSIS — F43.10 POST TRAUMATIC STRESS DISORDER (PTSD): ICD-10-CM

## 2021-08-13 PROCEDURE — 90791 PSYCH DIAGNOSTIC EVALUATION: CPT | Performed by: SOCIAL WORKER
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
## 2021-10-07 ENCOUNTER — CLINICAL SUPPORT (OUTPATIENT)
Dept: FAMILY MEDICINE CLINIC | Facility: CLINIC | Age: 26
End: 2021-10-07

## 2021-10-07 DIAGNOSIS — Z20.822 EXPOSURE TO COVID-19 VIRUS: Primary | ICD-10-CM

## 2021-10-07 PROCEDURE — U0004 COV-19 TEST NON-CDC HGH THRU: HCPCS | Performed by: PSYCHOLOGIST

## 2021-10-08 LAB — SARS-COV-2 RNA NOSE QL NAA+PROBE: NOT DETECTED

## 2021-12-17 ENCOUNTER — OFFICE VISIT (OUTPATIENT)
Dept: FAMILY MEDICINE CLINIC | Facility: CLINIC | Age: 26
End: 2021-12-17

## 2021-12-17 VITALS
HEART RATE: 104 BPM | OXYGEN SATURATION: 96 % | HEIGHT: 70 IN | SYSTOLIC BLOOD PRESSURE: 150 MMHG | WEIGHT: 293 LBS | BODY MASS INDEX: 41.95 KG/M2 | TEMPERATURE: 98.6 F | DIASTOLIC BLOOD PRESSURE: 74 MMHG

## 2021-12-17 DIAGNOSIS — N39.0 URINARY TRACT INFECTION WITH HEMATURIA, SITE UNSPECIFIED: Primary | ICD-10-CM

## 2021-12-17 DIAGNOSIS — R31.9 URINARY TRACT INFECTION WITH HEMATURIA, SITE UNSPECIFIED: Primary | ICD-10-CM

## 2021-12-17 DIAGNOSIS — Z23 ENCOUNTER FOR IMMUNIZATION: ICD-10-CM

## 2021-12-17 LAB
BILIRUB BLD-MCNC: NEGATIVE MG/DL
CLARITY, POC: CLEAR
COLOR UR: YELLOW
GLUCOSE UR STRIP-MCNC: NEGATIVE MG/DL
KETONES UR QL: NEGATIVE
LEUKOCYTE EST, POC: ABNORMAL
NITRITE UR-MCNC: POSITIVE MG/ML
PH UR: 6 [PH] (ref 5–8)
PROT UR STRIP-MCNC: ABNORMAL MG/DL
RBC # UR STRIP: ABNORMAL /UL
SP GR UR: 1.03 (ref 1–1.03)
UROBILINOGEN UR QL: NORMAL

## 2021-12-17 PROCEDURE — 99213 OFFICE O/P EST LOW 20 MIN: CPT | Performed by: FAMILY MEDICINE

## 2021-12-17 PROCEDURE — 90471 IMMUNIZATION ADMIN: CPT | Performed by: FAMILY MEDICINE

## 2021-12-17 PROCEDURE — 90686 IIV4 VACC NO PRSV 0.5 ML IM: CPT | Performed by: FAMILY MEDICINE

## 2021-12-17 RX ORDER — BUPROPION HYDROCHLORIDE 300 MG/1
300 TABLET ORAL EVERY MORNING
COMMUNITY
Start: 2021-10-12 | End: 2022-02-22 | Stop reason: SDUPTHER

## 2021-12-17 RX ORDER — BUPROPION HYDROCHLORIDE 150 MG/1
150 TABLET ORAL EVERY MORNING
COMMUNITY
Start: 2021-10-12 | End: 2022-02-22 | Stop reason: SDUPTHER

## 2021-12-17 RX ORDER — SULFAMETHOXAZOLE AND TRIMETHOPRIM 800; 160 MG/1; MG/1
1 TABLET ORAL 2 TIMES DAILY
Qty: 14 TABLET | Refills: 0 | Status: SHIPPED | OUTPATIENT
Start: 2021-12-17 | End: 2021-12-24

## 2021-12-17 RX ORDER — PHENAZOPYRIDINE HYDROCHLORIDE 100 MG/1
100 TABLET, FILM COATED ORAL 3 TIMES DAILY PRN
Qty: 6 TABLET | Refills: 0 | Status: SHIPPED | OUTPATIENT
Start: 2021-12-17 | End: 2021-12-19

## 2021-12-17 NOTE — PROGRESS NOTES
"Chief Complaint  Difficulty Urinating (x 1 week for all symptoms), Back Pain, and Urinary Urgency    Subjective          Kamryn Gross presents to [facility]  The patient is here because of urinary urgency, back pain and occasional dysuria x 1 week. No fever or chills. The patient was not able to get back for her annual physical because she moved to [place], but she stated that she is back now for good.  The patient states that she is still on the same medications that she was taking before.      Objective   Vital Signs:   /74 (BP Location: Right arm, Patient Position: Sitting, Cuff Size: Large Adult)   Pulse 104   Temp 98.6 °F (37 °C) (Temporal)   Ht 177.8 cm (70\")   Wt (!) 182 kg (401 lb)   SpO2 96%   BMI 57.54 kg/m²     Physical Exam  Vitals and nursing note reviewed.   Constitutional:       Appearance: Normal appearance.   HENT:      Head: Normocephalic and atraumatic.      Right Ear: Tympanic membrane and ear canal normal.      Left Ear: Tympanic membrane and ear canal normal.      Nose: Nose normal.      Mouth/Throat:      Mouth: Mucous membranes are moist.   Eyes:      Extraocular Movements: Extraocular movements intact.      Pupils: Pupils are equal, round, and reactive to light.   Cardiovascular:      Rate and Rhythm: Normal rate and regular rhythm.  No extrasystoles are present.     Heart sounds: Normal heart sounds. No murmur heard.      Pulmonary:      Effort: Pulmonary effort is normal.      Breath sounds: Normal breath sounds. No decreased breath sounds, wheezing or rhonchi.   Abdominal:      Palpations: Abdomen is soft. There is no mass.      Tenderness: There is no abdominal tenderness. There is left CVA tenderness. There is no right CVA tenderness, guarding or rebound.   Musculoskeletal:         General: Normal range of motion.      Cervical back: Normal range of motion and neck supple.      Right lower leg: No edema.      Left lower leg: No edema. "   Skin:     Findings: No rash.   Neurological:      General: No focal deficit present.      Mental Status: She is alert and oriented to person, place, and time.      Cranial Nerves: Cranial nerves are intact.      Sensory: Sensation is intact.      Motor: Motor function is intact.      Coordination: Coordination is intact.      Gait: Gait is intact.      Deep Tendon Reflexes: Reflexes are normal and symmetric.   Psychiatric:         Attention and Perception: Attention and perception normal.         Mood and Affect: Mood normal.         Speech: Speech normal.         Behavior: Behavior normal. Behavior is cooperative.         Thought Content: Thought content normal.        Result Review :                 Assessment and Plan    Diagnoses and all orders for this visit:    1. Urinary tract infection with hematuria, site unspecified (Primary)  -     sulfamethoxazole-trimethoprim (Bactrim DS) 800-160 MG per tablet; Take 1 tablet by mouth 2 (Two) Times a Day for 7 days.  Dispense: 14 tablet; Refill: 0  -     phenazopyridine (Pyridium) 100 MG tablet; Take 1 tablet by mouth 3 (Three) Times a Day As Needed for Bladder Spasms for up to 2 days.  Dispense: 6 tablet; Refill: 0    2. Encounter for immunization  -     FluLaval/Fluarix/Fluzone >6 Months ([id number])        Follow Up   Return in about 26 days (around 1/12/2022) for Annual physical.  Patient was given instructions and counseling regarding her condition or for health maintenance advice. Please see specific information pulled into the AVS if appropriate.     We will start the patient on Bactrim DS 1 p.o. twice daily for 7 days and Pyridium 100 mg 1 p.o. 3 times daily as needed.  We will also update the patient's flu vaccine today.  The patient was also advised to return to the clinic or go to the emergency room if symptoms worsen or fail to improve.              This document has been electronically signed by Osmany Mathur MD  December 17, 2021 14:35 EST

## 2022-01-12 ENCOUNTER — OFFICE VISIT (OUTPATIENT)
Dept: FAMILY MEDICINE CLINIC | Facility: CLINIC | Age: 27
End: 2022-01-12

## 2022-01-12 VITALS
SYSTOLIC BLOOD PRESSURE: 143 MMHG | RESPIRATION RATE: 20 BRPM | DIASTOLIC BLOOD PRESSURE: 82 MMHG | HEART RATE: 89 BPM | TEMPERATURE: 96.8 F | OXYGEN SATURATION: 100 % | WEIGHT: 293 LBS | BODY MASS INDEX: 41.95 KG/M2 | HEIGHT: 70 IN

## 2022-01-12 DIAGNOSIS — Z86.69 HISTORY OF SLEEP APNEA: ICD-10-CM

## 2022-01-12 DIAGNOSIS — F41.9 ANXIETY DISORDER, UNSPECIFIED TYPE: Chronic | ICD-10-CM

## 2022-01-12 DIAGNOSIS — E66.01 CLASS 3 SEVERE OBESITY WITHOUT SERIOUS COMORBIDITY WITH BODY MASS INDEX (BMI) OF 50.0 TO 59.9 IN ADULT, UNSPECIFIED OBESITY TYPE: ICD-10-CM

## 2022-01-12 DIAGNOSIS — Z00.00 ROUTINE GENERAL MEDICAL EXAMINATION AT A HEALTH CARE FACILITY: Primary | ICD-10-CM

## 2022-01-12 DIAGNOSIS — G43.909 MIGRAINE WITHOUT STATUS MIGRAINOSUS, NOT INTRACTABLE, UNSPECIFIED MIGRAINE TYPE: ICD-10-CM

## 2022-01-12 DIAGNOSIS — F33.1 MAJOR DEPRESSIVE DISORDER, RECURRENT EPISODE, MODERATE: Chronic | ICD-10-CM

## 2022-01-12 DIAGNOSIS — F41.1 GENERALIZED ANXIETY DISORDER: ICD-10-CM

## 2022-01-12 DIAGNOSIS — F33.1 MAJOR DEPRESSIVE DISORDER, RECURRENT, MODERATE: ICD-10-CM

## 2022-01-12 LAB
ALBUMIN SERPL-MCNC: 4.23 G/DL (ref 3.5–5.2)
ALBUMIN/GLOB SERPL: 1.3 G/DL
ALP SERPL-CCNC: 82 U/L (ref 39–117)
ALT SERPL W P-5'-P-CCNC: 32 U/L (ref 1–33)
ANION GAP SERPL CALCULATED.3IONS-SCNC: 8.7 MMOL/L (ref 5–15)
AST SERPL-CCNC: 20 U/L (ref 1–32)
BASOPHILS # BLD AUTO: 0.02 10*3/MM3 (ref 0–0.2)
BASOPHILS NFR BLD AUTO: 0.2 % (ref 0–1.5)
BILIRUB BLD-MCNC: NEGATIVE MG/DL
BILIRUB SERPL-MCNC: 0.3 MG/DL (ref 0–1.2)
BUN SERPL-MCNC: 9 MG/DL (ref 6–20)
BUN/CREAT SERPL: 8.9 (ref 7–25)
CALCIUM SPEC-SCNC: 9.2 MG/DL (ref 8.6–10.5)
CHLORIDE SERPL-SCNC: 106 MMOL/L (ref 98–107)
CHOLEST SERPL-MCNC: 145 MG/DL (ref 0–200)
CLARITY, POC: CLEAR
CO2 SERPL-SCNC: 27.3 MMOL/L (ref 22–29)
COLOR UR: ABNORMAL
CREAT SERPL-MCNC: 1.01 MG/DL (ref 0.57–1)
DEPRECATED RDW RBC AUTO: 46.5 FL (ref 37–54)
EOSINOPHIL # BLD AUTO: 0.33 10*3/MM3 (ref 0–0.4)
EOSINOPHIL NFR BLD AUTO: 3.4 % (ref 0.3–6.2)
ERYTHROCYTE [DISTWIDTH] IN BLOOD BY AUTOMATED COUNT: 16.5 % (ref 12.3–15.4)
GFR SERPL CREATININE-BSD FRML MDRD: 67 ML/MIN/1.73
GLOBULIN UR ELPH-MCNC: 3.3 GM/DL
GLUCOSE SERPL-MCNC: 95 MG/DL (ref 65–99)
GLUCOSE UR STRIP-MCNC: NEGATIVE MG/DL
HCT VFR BLD AUTO: 42 % (ref 34–46.6)
HDLC SERPL-MCNC: 33 MG/DL (ref 40–60)
HGB BLD-MCNC: 12.5 G/DL (ref 12–15.9)
IMM GRANULOCYTES # BLD AUTO: 0.02 10*3/MM3 (ref 0–0.05)
IMM GRANULOCYTES NFR BLD AUTO: 0.2 % (ref 0–0.5)
KETONES UR QL: NEGATIVE
LDLC SERPL CALC-MCNC: 93 MG/DL (ref 0–100)
LDLC/HDLC SERPL: 2.79 {RATIO}
LEUKOCYTE EST, POC: NEGATIVE
LYMPHOCYTES # BLD AUTO: 3 10*3/MM3 (ref 0.7–3.1)
LYMPHOCYTES NFR BLD AUTO: 31.3 % (ref 19.6–45.3)
MCH RBC QN AUTO: 23.5 PG (ref 26.6–33)
MCHC RBC AUTO-ENTMCNC: 29.8 G/DL (ref 31.5–35.7)
MCV RBC AUTO: 79.1 FL (ref 79–97)
MONOCYTES # BLD AUTO: 0.61 10*3/MM3 (ref 0.1–0.9)
MONOCYTES NFR BLD AUTO: 6.4 % (ref 5–12)
NEUTROPHILS NFR BLD AUTO: 5.59 10*3/MM3 (ref 1.7–7)
NEUTROPHILS NFR BLD AUTO: 58.5 % (ref 42.7–76)
NITRITE UR-MCNC: POSITIVE MG/ML
NRBC BLD AUTO-RTO: 0 /100 WBC (ref 0–0.2)
PH UR: 6 [PH] (ref 5–8)
PLATELET # BLD AUTO: 347 10*3/MM3 (ref 140–450)
PMV BLD AUTO: 10.8 FL (ref 6–12)
POTASSIUM SERPL-SCNC: 4.2 MMOL/L (ref 3.5–5.2)
PROT SERPL-MCNC: 7.5 G/DL (ref 6–8.5)
PROT UR STRIP-MCNC: ABNORMAL MG/DL
RBC # BLD AUTO: 5.31 10*6/MM3 (ref 3.77–5.28)
RBC # UR STRIP: ABNORMAL /UL
SODIUM SERPL-SCNC: 142 MMOL/L (ref 136–145)
SP GR UR: 1.03 (ref 1–1.03)
TRIGL SERPL-MCNC: 100 MG/DL (ref 0–150)
TSH SERPL DL<=0.05 MIU/L-ACNC: 2.79 UIU/ML (ref 0.27–4.2)
UROBILINOGEN UR QL: NORMAL
VLDLC SERPL-MCNC: 19 MG/DL (ref 5–40)
WBC NRBC COR # BLD: 9.57 10*3/MM3 (ref 3.4–10.8)

## 2022-01-12 PROCEDURE — 3008F BODY MASS INDEX DOCD: CPT | Performed by: FAMILY MEDICINE

## 2022-01-12 PROCEDURE — 85025 COMPLETE CBC W/AUTO DIFF WBC: CPT | Performed by: FAMILY MEDICINE

## 2022-01-12 PROCEDURE — 81002 URINALYSIS NONAUTO W/O SCOPE: CPT | Performed by: FAMILY MEDICINE

## 2022-01-12 PROCEDURE — 84443 ASSAY THYROID STIM HORMONE: CPT | Performed by: FAMILY MEDICINE

## 2022-01-12 PROCEDURE — 80061 LIPID PANEL: CPT | Performed by: FAMILY MEDICINE

## 2022-01-12 PROCEDURE — 80053 COMPREHEN METABOLIC PANEL: CPT | Performed by: FAMILY MEDICINE

## 2022-01-12 PROCEDURE — 2014F MENTAL STATUS ASSESS: CPT | Performed by: FAMILY MEDICINE

## 2022-01-12 PROCEDURE — 99395 PREV VISIT EST AGE 18-39: CPT | Performed by: FAMILY MEDICINE

## 2022-01-12 RX ORDER — SUMATRIPTAN 25 MG/1
TABLET, FILM COATED ORAL
Qty: 20 TABLET | Refills: 0 | Status: SHIPPED | OUTPATIENT
Start: 2022-01-12 | End: 2022-07-29 | Stop reason: SDUPTHER

## 2022-01-12 RX ORDER — ARIPIPRAZOLE 10 MG/1
10 TABLET ORAL DAILY
Qty: 30 TABLET | Refills: 0 | Status: SHIPPED | OUTPATIENT
Start: 2022-01-12 | End: 2022-02-22 | Stop reason: SDUPTHER

## 2022-01-12 NOTE — PROGRESS NOTES
"Chief Complaint  Annual Exam (pt needing to discuss referrals)    Subjective          Kamryn Gross presents to [facility]  The patient is here for her annual physical, she stated that she moved to [place] for a few months with her fiance but then decided to comeback. She has a history of Depression, anxiety, PTSD, Migraine headaches, Obesity, sleep apnea and she still smokes about 5 cigarettes a day. The patient has not had a chance to get reestablished with behavioral health and she is needing a prescription for some of her medications.  The patient also started to get she will be starting her diet plan today with Nutrisysytem.      Objective   Vital Signs:   /82 (BP Location: Right arm, Patient Position: Sitting, Cuff Size: Adult)   Pulse 89   Temp 96.8 °F (36 °C) (Temporal)   Resp 20   Ht 177.8 cm (70\")   Wt (!) 184 kg (406 lb 3.2 oz)   SpO2 100%   BMI 58.28 kg/m²     Physical Exam  Vitals and nursing note reviewed.   Constitutional:       Appearance: Normal appearance. She is obese.   HENT:      Head: Normocephalic and atraumatic.      Right Ear: Tympanic membrane and ear canal normal.      Left Ear: Tympanic membrane and ear canal normal.      Nose: Nose normal.      Mouth/Throat:      Mouth: Mucous membranes are moist.   Eyes:      Extraocular Movements: Extraocular movements intact.      Pupils: Pupils are equal, round, and reactive to light.   Cardiovascular:      Rate and Rhythm: Normal rate and regular rhythm.  No extrasystoles are present.     Heart sounds: Normal heart sounds. No murmur heard.      Pulmonary:      Effort: Pulmonary effort is normal.      Breath sounds: Normal breath sounds. No decreased breath sounds, wheezing or rhonchi.   Chest:   Breasts:      Right: No axillary adenopathy or supraclavicular adenopathy.      Left: No axillary adenopathy or supraclavicular adenopathy.       Abdominal:      Palpations: Abdomen is soft. There is no mass. "      Tenderness: There is no abdominal tenderness. There is no right CVA tenderness, left CVA tenderness, guarding or rebound.   Musculoskeletal:         General: Normal range of motion.      Cervical back: Normal range of motion and neck supple.      Right lower leg: No edema.      Left lower leg: No edema.   Lymphadenopathy:      Head:      Right side of head: No submental, submandibular, preauricular, posterior auricular or occipital adenopathy.      Left side of head: No submental, submandibular, preauricular, posterior auricular or occipital adenopathy.      Cervical: No cervical adenopathy.      Upper Body:      Right upper body: No supraclavicular or axillary adenopathy.      Left upper body: No supraclavicular or axillary adenopathy.   Skin:     Findings: No rash.   Neurological:      General: No focal deficit present.      Mental Status: She is alert and oriented to person, place, and time.      Cranial Nerves: Cranial nerves are intact.      Sensory: Sensation is intact.      Motor: Motor function is intact.      Coordination: Coordination is intact.      Gait: Gait is intact.      Deep Tendon Reflexes: Reflexes are normal and symmetric.   Psychiatric:         Attention and Perception: Attention and perception normal.         Mood and Affect: Mood normal.         Speech: Speech normal.         Behavior: Behavior normal. Behavior is cooperative.         Thought Content: Thought content normal.         Cognition and Memory: Cognition normal.         Judgment: Judgment normal.        Result Review :                 Assessment and Plan    Diagnoses and all orders for this visit:    1. Routine general medical examination at a health care facility (Primary)  -     CBC & Differential  -     Comprehensive Metabolic Panel  -     Lipid Panel  -     TSH  -     POC Urinalysis Dipstick    2. Major depressive disorder, recurrent, moderate (HCC)    3. Migraine without status migrainosus, not intractable, unspecified  migraine type  -     SUMAtriptan (Imitrex) 25 MG tablet; Take one tablet at onset of headache. May repeat dose one time in 2 hours if headache not relieved.  Dispense: 20 tablet; Refill: 0    4. Major depressive disorder, recurrent episode, moderate (HCC)  -     ARIPiprazole (Abilify) 10 MG tablet; Take 1 tablet by mouth Daily.  Dispense: 30 tablet; Refill: 0    5. Anxiety disorder, unspecified type  -     ARIPiprazole (Abilify) 10 MG tablet; Take 1 tablet by mouth Daily.  Dispense: 30 tablet; Refill: 0    6. Generalized anxiety disorder    7. Class 3 severe obesity without serious comorbidity with body mass index (BMI) of 50.0 to 59.9 in adult, unspecified obesity type (HCC)    8. History of sleep apnea  -     Ambulatory Referral to Pulmonology        Follow Up   Return in about 3 months (around 4/12/2022).  Patient was given instructions and counseling regarding her condition or for health maintenance advice. Please see specific information pulled into the AVS if appropriate.     The preventive exam has been reviewed in detail.  The patient has been fully counseled on preventative guidelines for vaccines, cancer screenings, and other health maintenance needs.   The patient has been counseled on guidelines for maintaining a lifestyle to promote good health and to minimize chronic diseases.  The patient has been assisted with scheduling these healthcare procedures for the coming year and given a written document of health maintenance and anticipatory guidance for age with the AVS.    Will refill the patient's Abilify for now until she gets reestablished with the behavioral health clinic.  We will also refill her Imitrex for her migraine to take as needed.  The patient was advised to continue all her regular medications as prescribed.  We will also refer the patient to the pulmonologist for reevaluation of her sleep apnea.  The patient was again counseled regarding the importance of quitting smoking, diet, exercise  and medication compliance.            This document has been electronically signed by Osmany Mathur MD  January 12, 2022 16:26 EST

## 2022-02-22 ENCOUNTER — TELEMEDICINE (OUTPATIENT)
Dept: PSYCHIATRY | Facility: CLINIC | Age: 27
End: 2022-02-22

## 2022-02-22 DIAGNOSIS — F33.1 MAJOR DEPRESSIVE DISORDER, RECURRENT EPISODE, MODERATE: Chronic | ICD-10-CM

## 2022-02-22 DIAGNOSIS — F51.5 NIGHTMARES: ICD-10-CM

## 2022-02-22 DIAGNOSIS — F43.10 POST TRAUMATIC STRESS DISORDER (PTSD): Chronic | ICD-10-CM

## 2022-02-22 DIAGNOSIS — F41.9 ANXIETY DISORDER, UNSPECIFIED TYPE: Chronic | ICD-10-CM

## 2022-02-22 PROCEDURE — 90792 PSYCH DIAG EVAL W/MED SRVCS: CPT | Performed by: NURSE PRACTITIONER

## 2022-02-22 RX ORDER — BUPROPION HYDROCHLORIDE 300 MG/1
300 TABLET ORAL EVERY MORNING
Qty: 30 TABLET | Refills: 0 | Status: SHIPPED | OUTPATIENT
Start: 2022-02-22 | End: 2022-03-22 | Stop reason: SDUPTHER

## 2022-02-22 RX ORDER — PRAZOSIN HYDROCHLORIDE 1 MG/1
1 CAPSULE ORAL NIGHTLY
Qty: 30 CAPSULE | Refills: 0 | Status: SHIPPED | OUTPATIENT
Start: 2022-02-22 | End: 2022-03-22 | Stop reason: SDUPTHER

## 2022-02-22 RX ORDER — DESVENLAFAXINE 100 MG/1
100 TABLET, EXTENDED RELEASE ORAL DAILY
Qty: 30 TABLET | Refills: 0 | Status: SHIPPED | OUTPATIENT
Start: 2022-02-22 | End: 2022-03-22 | Stop reason: SDUPTHER

## 2022-02-22 RX ORDER — ARIPIPRAZOLE 15 MG/1
7.5 TABLET ORAL DAILY
Qty: 15 TABLET | Refills: 0 | Status: SHIPPED | OUTPATIENT
Start: 2022-02-22 | End: 2022-03-22 | Stop reason: SDUPTHER

## 2022-02-22 RX ORDER — BUPROPION HYDROCHLORIDE 150 MG/1
150 TABLET ORAL EVERY MORNING
Qty: 30 TABLET | Refills: 0 | Status: SHIPPED | OUTPATIENT
Start: 2022-02-22 | End: 2022-03-22 | Stop reason: SDUPTHER

## 2022-02-22 NOTE — PROGRESS NOTES
This provider is located at the [facility] (through [facility]), [address], [place], [address] using a secure [employer] Video Visit through [employer]. Patient is being seen remotely via telehealth at their home address in [place], and stated they are in a secure environment for this session. The patient's condition being diagnosed/treated is appropriate for telemedicine. The provider identified herself as well as her credentials.   The patient, and/or patients guardian, consent to be seen remotely, and when consent is given they understand that the consent allows for patient identifiable information to be sent to a third party as needed.   They may refuse to be seen remotely at any time. The electronic data is encrypted and password protected, and the patient and/or guardian has been advised of the potential risks to privacy not withstanding such measures.    You have chosen to receive care through a telehealth visit.  Do you consent to use a video/audio connection for your medical care today? Yes        Subjective   Kamryn Gross is a 26 y.o. female who presents today for follow up    Chief Complaint: Depression, anxiety, sleeping difficulties, and nightmares    Accompanied by: The patient reports she is alone at today's encounter    History of Present Illness:   The patient has not been seen by a psychiatric mental health care provider in 6 months.  The patient states she feels her moods have been doing good since she was last seen by behavioral health.  The patient reports her primary care provider has been sending in refills of her psychotropic medications, and her moods have been stable.  The patient rates her symptoms of depression at a 4/10 on a 0-10 scale, with 10 being the worst.  The patient reports her symptoms of depression as a lack of motivation and sadness.  The patient rates her anxiety at a 4/10 on a 0-10 scale, with 10 being the worst.  The patient  reports her symptoms of anxiety as very minimal, and sometimes just excessive worry.  The patient reports her appetite as good.  The patient reports she has gained more weight since her last encounter with behavioral health.  This APRN discussed with the patient healthy diet and lifestyle, and also side effects of medications that can include weight gain.  The patient reports as the weather starts to get nicer she plans to restart her diet and being more physically active.  The patient reports her sleep as good.  The patient states she averages 8-10 hours of sleep each night.  The patient reports only occasional nightmares, and nothing like before she started taking prazosin. The patient reports that since her last encounter with behavioral health she has been diagnosed with sleep apnea and now wears a CPAP.  The patient reports the use of CPAP seems to help.  The patient reports compliance with her current psychotropic medication regimen, and reports her primary care provider has been providing refills on her medications.  The patient denies any side effects from her current medication regimen.  The patient denies any abnormal muscle movements or tics.   The patient would like to adjust her medications at this visit as she reports her moods have been doing good, and she would like to trial a decrease of her Abilify due to her reported weight gain.  The patient denies any suicidal or homicidal ideations, plans, or intent at today's encounter and is convincing.  The patient denies any auditory hallucinations or visual hallucinations.  The patient does not endorse any significant symptoms consistent with matt or psychosis at today's encounter.        Prior Psychiatric Medications:  Prozac  Zoloft  Trileptal  Paxil  Hydroxyzine  Prazosin  Desvenlafaxine  Wellbutrin XL  Abilify        Last Menstrual Period:  3-4 weeks ago, states due.  The patient was educated that her prescribed medications can have potential risk to  a developing fetus. The patient is advised to contact this APRN/this office if she becomes pregnant or plans to become pregnant.  Pt verbalizes understanding and acknowledged agreement with this plan in her own words.        The following portions of the patient's history were reviewed and updated as appropriate: allergies, current medications, past family history, past medical history, past social history, past surgical history and problem list.          Past Medical History:  Past Medical History:   Diagnosis Date   • Anxiety    • Bipolar disorder (HCC)    • Depression    • Headache    • Obesity    • Panic disorder    • PTSD (post-traumatic stress disorder)    • Self-injurious behavior    • Sleep apnea    • Suicide attempt (HCC)     attempted overdose in 2017       Social History:  Social History     Socioeconomic History   • Marital status: Single   Tobacco Use   • Smoking status: Current Every Day Smoker     Packs/day: 0.50     Years: 8.00     Pack years: 4.00     Start date: 2014   • Smokeless tobacco: Never Used   Vaping Use   • Vaping Use: Never used   Substance and Sexual Activity   • Alcohol use: Not Currently   • Drug use: Not Currently   • Sexual activity: Yes     Partners: Male     Birth control/protection: None       Family History:  Family History   Problem Relation Age of Onset   • Diabetes Mother    • Anxiety disorder Mother    • Depression Mother    • Anxiety disorder Father    • Depression Father    • Anxiety disorder Sister    • Depression Sister    • Depression Maternal Aunt    • Heart attack Paternal Aunt    • Anxiety disorder Paternal Aunt    • Depression Paternal Aunt    • No Known Problems Maternal Uncle    • No Known Problems Paternal Uncle    • Lung disease Maternal Grandfather    • Alzheimer's disease Maternal Grandmother    • Lung cancer Paternal Grandfather    • Alzheimer's disease Paternal Grandmother    • Alcohol abuse Paternal Grandmother    • Anxiety disorder Sister    • Depression  Sister    • Anxiety disorder Paternal Aunt    • Depression Paternal Aunt    • No Known Problems Maternal Uncle    • No Known Problems Paternal Uncle    • Heart attack Paternal Uncle    • Other Paternal Uncle        Past Surgical History:  Past Surgical History:   Procedure Laterality Date   • TONSILLECTOMY         Problem List:  Patient Active Problem List   Diagnosis   • Major depressive disorder, recurrent, moderate (HCC)   • Class 3 severe obesity without serious comorbidity with body mass index (BMI) of 50.0 to 59.9 in adult (formerly [facility])   • Smoker   • Migraine without aura and without status migrainosus, not intractable   • Generalized anxiety disorder   • History of sleep apnea       Allergy:   No Known Allergies     Current Medications:   Current Outpatient Medications   Medication Sig Dispense Refill   • ARIPiprazole (Abilify) 15 MG tablet Take 0.5 tablets by mouth Daily. 15 tablet 0   • buPROPion XL (WELLBUTRIN XL) 150 MG 24 hr tablet Take 1 tablet by mouth Every Morning. 30 tablet 0   • buPROPion XL (WELLBUTRIN XL) 300 MG 24 hr tablet Take 1 tablet by mouth Every Morning. 30 tablet 0   • desvenlafaxine (PRISTIQ) 100 MG 24 hr tablet Take 1 tablet by mouth Daily. 30 tablet 0   • prazosin (MINIPRESS) 1 MG capsule Take 1 capsule by mouth Every Night. 30 capsule 0   • SUMAtriptan (Imitrex) 25 MG tablet Take one tablet at onset of headache. May repeat dose one time in 2 hours if headache not relieved. 20 tablet 0     No current facility-administered medications for this visit.       Review of Symptoms:    Review of Systems   Constitutional: Positive for activity change, chills, fatigue and fever. Negative for appetite change and diaphoresis.   HENT: Positive for congestion, rhinorrhea and sinus pressure.    Eyes: Negative.    Respiratory: Positive for cough.    Cardiovascular: Negative.    Gastrointestinal: Negative.    Genitourinary: Negative.    Musculoskeletal: Positive for arthralgias and myalgias.   Skin:  Negative.    Neurological: Negative.    Psychiatric/Behavioral: Positive for depressed mood and stress. Negative for agitation, behavioral problems, decreased concentration, dysphoric mood, hallucinations, self-injury, sleep disturbance, suicidal ideas and negative for hyperactivity. The patient is nervous/anxious.          Physical Exam:   There were no vitals taken for this visit. There is no height or weight on file to calculate BMI.   Due to the remote nature of this encounter (virtual encounter), vitals were unable to be obtained.  Height stated at 69 inches.  Weight stated at 400 pounds.      Physical Exam  Constitutional:       Appearance: Normal appearance.   Neurological:      Mental Status: She is alert and oriented to person, place, and time.   Psychiatric:         Attention and Perception: Attention normal.         Mood and Affect: Mood normal. Affect is blunt.         Speech: Speech normal.         Behavior: Behavior normal. Behavior is cooperative.         Thought Content: Thought content normal. Thought content is not paranoid or delusional. Thought content does not include homicidal or suicidal ideation. Thought content does not include homicidal or suicidal plan.         Cognition and Memory: Cognition and memory normal.           Mental Status Exam:   Hygiene:   good  Cooperation:  Cooperative  Eye Contact:  Good  Psychomotor Behavior:  Appropriate  Affect:  Blunted  Mood: normal  Hopelessness: Denies  Speech:  Monotone  Thought Process:  Linear  Thought Content:  Mood congruent  Suicidal:  None  Homicidal:  None  Hallucinations:  None  Delusion:  None  Memory:  Intact  Orientation:  Person, Place, Time and Situation  Reliability:  fair  Insight:  Fair  Judgement:  Fair  Impulse Control:  Fair  Physical/Medical Issues:  No        Previous Provider notes and available records reviewed by this APRN at today's encounter.           Lab Results:   No visits with results within 1 Month(s) from this  visit.   Latest known visit with results is:   Office Visit on 01/12/2022   Component Date Value Ref Range Status   • Glucose 01/12/2022 95  65 - 99 mg/dL Final   • BUN 01/12/2022 9  6 - 20 mg/dL Final   • Creatinine 01/12/2022 1.01* 0.57 - 1.00 mg/dL Final   • Sodium 01/12/2022 142  136 - 145 mmol/L Final   • Potassium 01/12/2022 4.2  3.5 - 5.2 mmol/L Final   • Chloride 01/12/2022 106  98 - 107 mmol/L Final   • CO2 01/12/2022 27.3  22.0 - 29.0 mmol/L Final   • Calcium 01/12/2022 9.2  8.6 - 10.5 mg/dL Final   • Total Protein 01/12/2022 7.5  6.0 - 8.5 g/dL Final   • Albumin 01/12/2022 4.23  3.50 - 5.20 g/dL Final   • ALT (SGPT) 01/12/2022 32  1 - 33 U/L Final   • AST (SGOT) 01/12/2022 20  1 - 32 U/L Final   • Alkaline Phosphatase 01/12/2022 82  39 - 117 U/L Final   • Total Bilirubin 01/12/2022 0.3  0.0 - 1.2 mg/dL Final   • eGFR Non  Amer 01/12/2022 67  >60 mL/min/1.73 Final   • Globulin 01/12/2022 3.3  gm/dL Final   • A/G Ratio 01/12/2022 1.3  g/dL Final   • BUN/Creatinine Ratio 01/12/2022 8.9  7.0 - 25.0 Final   • Anion Gap 01/12/2022 8.7  5.0 - 15.0 mmol/L Final   • Total Cholesterol 01/12/2022 145  0 - 200 mg/dL Final   • Triglycerides 01/12/2022 100  0 - 150 mg/dL Final   • HDL Cholesterol 01/12/2022 33* 40 - 60 mg/dL Final   • LDL Cholesterol  01/12/2022 93  0 - 100 mg/dL Final   • VLDL Cholesterol 01/12/2022 19  5 - 40 mg/dL Final   • LDL/HDL Ratio 01/12/2022 2.79   Final   • TSH 01/12/2022 2.790  0.270 - 4.200 uIU/mL Final   • Color 01/12/2022 Dark Yellow  Yellow, Straw, Dark Yellow, Kizzy Final   • Clarity, UA 01/12/2022 Clear  Clear Final   • Glucose, UA 01/12/2022 Negative  Negative, 1000 mg/dL (3+) mg/dL Final   • Bilirubin 01/12/2022 Negative  Negative Final   • Ketones, UA 01/12/2022 Negative  Negative Final   • Specific Gravity  01/12/2022 1.030  1.005 - 1.030 Final   • Blood, UA 01/12/2022 3+* Negative Final   • pH, Urine 01/12/2022 6.0  5.0 - 8.0 Final   • Protein, POC 01/12/2022 Trace*  Negative mg/dL Final   • Urobilinogen, UA 01/12/2022 Normal  Normal Final   • Leukocytes 01/12/2022 Negative  Negative Final   • Nitrite, UA 01/12/2022 Positive* Negative Final   • WBC 01/12/2022 9.57  3.40 - 10.80 10*3/mm3 Final   • RBC 01/12/2022 5.31* 3.77 - 5.28 10*6/mm3 Final   • Hemoglobin 01/12/2022 12.5  12.0 - 15.9 g/dL Final   • Hematocrit 01/12/2022 42.0  34.0 - 46.6 % Final   • MCV 01/12/2022 79.1  79.0 - 97.0 fL Final   • MCH 01/12/2022 23.5* 26.6 - 33.0 pg Final   • MCHC 01/12/2022 29.8* 31.5 - 35.7 g/dL Final   • RDW 01/12/2022 16.5* 12.3 - 15.4 % Final   • RDW-SD 01/12/2022 46.5  37.0 - 54.0 fl Final   • MPV 01/12/2022 10.8  6.0 - 12.0 fL Final   • Platelets 01/12/2022 347  140 - 450 10*3/mm3 Final   • Neutrophil % 01/12/2022 58.5  42.7 - 76.0 % Final   • Lymphocyte % 01/12/2022 31.3  19.6 - 45.3 % Final   • Monocyte % 01/12/2022 6.4  5.0 - 12.0 % Final   • Eosinophil % 01/12/2022 3.4  0.3 - 6.2 % Final   • Basophil % 01/12/2022 0.2  0.0 - 1.5 % Final   • Immature Grans % 01/12/2022 0.2  0.0 - 0.5 % Final   • Neutrophils, Absolute 01/12/2022 5.59  1.70 - 7.00 10*3/mm3 Final   • Lymphocytes, Absolute 01/12/2022 3.00  0.70 - 3.10 10*3/mm3 Final   • Monocytes, Absolute 01/12/2022 0.61  0.10 - 0.90 10*3/mm3 Final   • Eosinophils, Absolute 01/12/2022 0.33  0.00 - 0.40 10*3/mm3 Final   • Basophils, Absolute 01/12/2022 0.02  0.00 - 0.20 10*3/mm3 Final   • Immature Grans, Absolute 01/12/2022 0.02  0.00 - 0.05 10*3/mm3 Final   • nRBC 01/12/2022 0.0  0.0 - 0.2 /100 WBC Final         Assessment/Plan   Problems Addressed this Visit     None      Visit Diagnoses     Major depressive disorder, recurrent episode, moderate (HCC)  (Chronic)       Relevant Medications    desvenlafaxine (PRISTIQ) 100 MG 24 hr tablet    buPROPion XL (WELLBUTRIN XL) 300 MG 24 hr tablet    buPROPion XL (WELLBUTRIN XL) 150 MG 24 hr tablet    ARIPiprazole (Abilify) 15 MG tablet    Anxiety disorder, unspecified type  (Chronic)       Relevant  Medications    desvenlafaxine (PRISTIQ) 100 MG 24 hr tablet    buPROPion XL (WELLBUTRIN XL) 300 MG 24 hr tablet    buPROPion XL (WELLBUTRIN XL) 150 MG 24 hr tablet    ARIPiprazole (Abilify) 15 MG tablet    Post traumatic stress disorder (PTSD)  (Chronic)       Relevant Medications    prazosin (MINIPRESS) 1 MG capsule    desvenlafaxine (PRISTIQ) 100 MG 24 hr tablet    buPROPion XL (WELLBUTRIN XL) 300 MG 24 hr tablet    buPROPion XL (WELLBUTRIN XL) 150 MG 24 hr tablet    ARIPiprazole (Abilify) 15 MG tablet    Nightmares        Relevant Medications    prazosin (MINIPRESS) 1 MG capsule    desvenlafaxine (PRISTIQ) 100 MG 24 hr tablet    buPROPion XL (WELLBUTRIN XL) 300 MG 24 hr tablet    buPROPion XL (WELLBUTRIN XL) 150 MG 24 hr tablet    ARIPiprazole (Abilify) 15 MG tablet      Diagnoses       Codes Comments    Major depressive disorder, recurrent episode, moderate (HCC)     ICD-10-CM: F33.1  ICD-9-CM: 296.32     Anxiety disorder, unspecified type     ICD-10-CM: F41.9  ICD-9-CM: 300.00     Post traumatic stress disorder (PTSD)     ICD-10-CM: F43.10  ICD-9-CM: 309.81     Nightmares     ICD-10-CM: F51.5  ICD-9-CM: 307.47           Visit Diagnoses:    ICD-10-CM ICD-9-CM   1. Major depressive disorder, recurrent episode, moderate (HCC)  F33.1 296.32   2. Anxiety disorder, unspecified type  F41.9 300.00   3. Post traumatic stress disorder (PTSD)  F43.10 309.81   4. Nightmares  F51.5 307.47          GOALS:  Short Term Goals: Patient will be compliant with medication, and patient will have no significant medication related side effects.  Patient will be engaged in psychotherapy as indicated.  Patient will report subjective improvement of symptoms.  Long term goals: To stabilize mood and treat/improve subjective symptoms, the patient will stay out of the hospital, the patient will be at an optimal level of functioning, and the patient will take all medications as prescribed.  The patient verbalized understanding and agreement  with goals that were mutually set.      TREATMENT PLAN: RE-START supportive psychotherapy efforts and take medications as indicated.  Medication and treatment options, both pharmacological and non-pharmacological treatment options, discussed during today's visit, including any off label use of medication. Patient acknowledged and verbally consented with current treatment plan and was educated on the importance of compliance with treatment and follow-up appointments.      - Decrease Abilify to 7.5 mg by mouth once daily for mood.  - Continue Wellbutrin  mg by mouth once daily for mood.  - Continue prazosin 1 mg by mouth once daily at bedtime for PTSD nightmares.  - Continue Pristiq 100 mg by mouth once daily for mood.      MEDICATION ISSUES:  Discussed medication options and treatment plan of prescribed medication, any off label use of medication, as well as the risks, benefits, any black box warnings including increased suicidality, and side effects including but not limited to potential falls, dizziness, possible impaired driving, GI side effects (change in appetite, abdominal discomfort, nausea, vomiting, diarrhea, and/or constipation), dry mouth, somnolence, sedation, insomnia, activation, agitation, irritation, tremors, abnormal muscle movements or disorders, tardive dyskinesia, akathisia, asthenia, headache, sweating, possible bruising or rare bleeding, electrolyte and/or fluid abnormalities, change in blood pressure/heart rate/and or heart rhythm, hypotension, sexual dysfunction, rare impulse control problems, rare seizures, rare neuroleptic malignant syndrome, increased risk of death and cerebrovascular events, change in blood glucose and increased risk for diabetes, change in triglycerides and cholesterol and increased risk for dyslipidemia,  weight gain, weight gain that can become problematic to health, skin conditions and reactions, and metabolic adversities among others. Patient and/or guardian are  agreeable to call the office with any worsening of symptoms or onset of side effects, or if any concerns or questions arise.  The contact information for the office is made available to the patient and/or guardian. Patient and/or guardian are agreeable to call 911 or go to the nearest ER should they begin having any SI/HI, or if any urgent concerns arise.      SUICIDE RISK ASSESSMENT AND SAFETY PLAN: Unalterable demographics and a history of mental health intervention indicate this patient is in a high risk category compared to the general population. At present, the patient denies active SI/HI, intentions, or plans at this time and agrees to seek immediate care should such thoughts develop. The patient verbalizes understanding of how to access emergency care if needed and agrees to do so. Consideration of suicide risk and protective factors such as history, current presentation, individual strengths and weaknesses, psychosocial and environmental stressors and variables, psychiatric illness and symptoms, medical conditions and pain, took place in this interview. Based on those considerations, the patient is determined: within individual baseline and presenting no imminent risk for suicide or homicide. Other recommendations: The patient does not meet the criteria for inpatient admission and is not a safety risk to self or others at today's visit. Inpatient treatment offers no significant advantages over outpatient treatment for this patient at today's visit.  The patient was given ample time for questions and fully participated in treatment planning.  The patient was encouraged to call the clinic with any questions or concerns.  The patient was informed of access to emergency care. If patient were to develop any significant symptomatology, suicidal ideation, homicidal ideation, any concerns, or feel unsafe at any time they are to call the clinic and if unable to get immediate assistance should immediately call 911 or  go to the nearest emergency room.  Patient contracted verbally for the following: If you are experiencing an emotional crisis or have thoughts of harming yourself or others, please go to your nearest local emergency room or call 911. Will continue to re-assess medication response and side effects frequently to establish efficacy and ensure safety. Risks, any black box warnings, side effects, off label usage, and benefits of medication and treatment discussed with patient, along with potential adverse side effects of current and/or newly prescribed medication, alternative treatment options, and OTC medications.  Patient verbalized understanding of potential risks, any off label use of medication, any black box warnings, and any side effects in their own words. The patient verbalized understanding and agreed to comply with the safety plan discussed in their own words.  Patient given the number to the office. Number also discussed of the 24- hour suicide hotline.         MEDS ORDERED DURING VISIT:  New Medications Ordered This Visit   Medications   • prazosin (MINIPRESS) 1 MG capsule     Sig: Take 1 capsule by mouth Every Night.     Dispense:  30 capsule     Refill:  0   • desvenlafaxine (PRISTIQ) 100 MG 24 hr tablet     Sig: Take 1 tablet by mouth Daily.     Dispense:  30 tablet     Refill:  0   • buPROPion XL (WELLBUTRIN XL) 300 MG 24 hr tablet     Sig: Take 1 tablet by mouth Every Morning.     Dispense:  30 tablet     Refill:  0   • buPROPion XL (WELLBUTRIN XL) 150 MG 24 hr tablet     Sig: Take 1 tablet by mouth Every Morning.     Dispense:  30 tablet     Refill:  0   • ARIPiprazole (Abilify) 15 MG tablet     Sig: Take 0.5 tablets by mouth Daily.     Dispense:  15 tablet     Refill:  0       Return in about 4 weeks (around 3/22/2022), or if symptoms worsen or fail to improve, for Next scheduled follow up and Recheck.         Functional Status: Moderate impairment     Prognosis: Fair with Ongoing Treatment              This document has been electronically signed by ELISE Dye  February 22, 2022 09:58 EST    Some of the data in this electronic note has been brought forward from a previous encounter, any necessary changes have been made, it has been reviewed by this APRN, and it is accurate.    Please note that portions of this note were completed with a voice recognition program.

## 2022-03-22 ENCOUNTER — TELEMEDICINE (OUTPATIENT)
Dept: PSYCHIATRY | Facility: CLINIC | Age: 27
End: 2022-03-22

## 2022-03-22 DIAGNOSIS — F33.1 MAJOR DEPRESSIVE DISORDER, RECURRENT EPISODE, MODERATE: Primary | Chronic | ICD-10-CM

## 2022-03-22 DIAGNOSIS — F41.9 ANXIETY DISORDER, UNSPECIFIED TYPE: Chronic | ICD-10-CM

## 2022-03-22 DIAGNOSIS — F51.5 NIGHTMARES: ICD-10-CM

## 2022-03-22 DIAGNOSIS — F43.10 POST TRAUMATIC STRESS DISORDER (PTSD): Chronic | ICD-10-CM

## 2022-03-22 PROCEDURE — 99214 OFFICE O/P EST MOD 30 MIN: CPT | Performed by: NURSE PRACTITIONER

## 2022-03-22 RX ORDER — PRAZOSIN HYDROCHLORIDE 1 MG/1
1 CAPSULE ORAL NIGHTLY
Qty: 30 CAPSULE | Refills: 0 | Status: SHIPPED | OUTPATIENT
Start: 2022-03-22 | End: 2022-04-11

## 2022-03-22 RX ORDER — DESVENLAFAXINE 100 MG/1
100 TABLET, EXTENDED RELEASE ORAL DAILY
Qty: 30 TABLET | Refills: 0 | Status: SHIPPED | OUTPATIENT
Start: 2022-03-22 | End: 2022-04-18 | Stop reason: SDUPTHER

## 2022-03-22 RX ORDER — BUPROPION HYDROCHLORIDE 150 MG/1
150 TABLET ORAL EVERY MORNING
Qty: 30 TABLET | Refills: 0 | Status: SHIPPED | OUTPATIENT
Start: 2022-03-22 | End: 2022-04-18 | Stop reason: SDUPTHER

## 2022-03-22 RX ORDER — ARIPIPRAZOLE 15 MG/1
7.5 TABLET ORAL DAILY
Qty: 15 TABLET | Refills: 0 | Status: SHIPPED | OUTPATIENT
Start: 2022-03-22 | End: 2022-04-18 | Stop reason: SDUPTHER

## 2022-03-22 RX ORDER — BUPROPION HYDROCHLORIDE 300 MG/1
300 TABLET ORAL EVERY MORNING
Qty: 30 TABLET | Refills: 0 | Status: SHIPPED | OUTPATIENT
Start: 2022-03-22 | End: 2022-04-18 | Stop reason: SDUPTHER

## 2022-03-22 NOTE — PROGRESS NOTES
"This provider is located at the [facility] (through [facility]), [address], [facility], [address] using a secure [employer] Video Visit through [employer]. Patient is being seen remotely via telehealth at their home address in [place], and stated they are in a secure environment for this session. The patient's condition being diagnosed/treated is appropriate for telemedicine. The provider identified herself as well as her credentials.   The patient, and/or patients guardian, consent to be seen remotely, and when consent is given they understand that the consent allows for patient identifiable information to be sent to a third party as needed.   They may refuse to be seen remotely at any time. The electronic data is encrypted and password protected, and the patient and/or guardian has been advised of the potential risks to privacy not withstanding such measures.    You have chosen to receive care through a telehealth visit.  Do you consent to use a video/audio connection for your medical care today? Yes        Subjective   Kamryn Gross is a 26 y.o. female who presents today for follow up    Chief Complaint: Depression, anxiety, sleeping difficulties, and nightmares    Accompanied by: The patient reports she is alone at today's encounter    History of Present Illness:   The patient describes her mood as \"pretty good\" since her last encounter with this APRN.  The patient states she feels her moods have remained stable since decreasing Abilify recently.  The patient rates her symptoms of depression at a 3/10 on a 0-10 scale, with 10 being the worst.  The patient reports her symptoms of depression as \"not wanting to do anything\" and crying easily, which she reports does not happen often.  The patient rates her symptoms of anxiety at a 6/10 on a 0-10 scale, with 10 being the worst.  The patient reports her symptoms of anxiety as biting her nails and gums, shaking her leg, and heart " racing at times.  The patient reports her appetite as good.  The patient states she does not feel she is eating as much as she previously was, and her eating has leveled out.  She states she does not know if she has lost any weight recently as she does not have a scale at home.  The patient reports her sleep as fair.  The patient reports 6-7 hours of sleep at night, but she does not feel it is enough sleep.  The patient reports sometimes it is hard to fall asleep because of her thoughts and mind racing at bedtime.  She denies any recent nightmares.  She reports daytime fatigue/sleepiness.  The patient denies any new medical problems or changes in medications since last appointment with this facility.  The patient reports compliance with her current medication regimen.  The patient denies any side effects or concerns from her current medication regimen, no abnormal musculoskeletal movements are reported.   The patient would like to not adjust or change her medications at this visit, as she feels her moods are at her typical baseline.  The patient denies any suicidal or homicidal ideations, plans, or intent at today's encounter and is convincing.  The patient does verbally contract for safety at today's encounter.  The patient denies any auditory hallucinations or visual hallucinations.  The patient does not endorse any significant symptoms consistent with matt or psychosis at today's encounter.        Prior Psychiatric Medications:  Prozac  Zoloft  Trileptal  Paxil  Hydroxyzine  Prazosin  Desvenlafaxine  Wellbutrin XL  Abilify        Last Menstrual Period:  3 weeks ago.  The patient was educated that her prescribed medications can have potential risk to a developing fetus. The patient is advised to contact this APRN/this office if she becomes pregnant or plans to become pregnant.  Pt verbalizes understanding and acknowledged agreement with this plan in her own words.        The following portions of the patient's  history were reviewed and updated as appropriate: allergies, current medications, past family history, past medical history, past social history, past surgical history and problem list.          Past Medical History:  Past Medical History:   Diagnosis Date   • Anxiety    • Bipolar disorder (HCC)    • Depression    • Headache    • Obesity    • Panic disorder    • PTSD (post-traumatic stress disorder)    • Self-injurious behavior    • Sleep apnea    • Suicide attempt (HCC)     attempted overdose in 2017       Social History:  Social History     Socioeconomic History   • Marital status: Single   Tobacco Use   • Smoking status: Current Every Day Smoker     Packs/day: 0.50     Years: 8.00     Pack years: 4.00     Start date: 2014   • Smokeless tobacco: Never Used   Vaping Use   • Vaping Use: Never used   Substance and Sexual Activity   • Alcohol use: Not Currently   • Drug use: Not Currently   • Sexual activity: Yes     Partners: Male     Birth control/protection: None       Family History:  Family History   Problem Relation Age of Onset   • Diabetes Mother    • Anxiety disorder Mother    • Depression Mother    • Anxiety disorder Father    • Depression Father    • Anxiety disorder Sister    • Depression Sister    • Depression Maternal Aunt    • Heart attack Paternal Aunt    • Anxiety disorder Paternal Aunt    • Depression Paternal Aunt    • No Known Problems Maternal Uncle    • No Known Problems Paternal Uncle    • Lung disease Maternal Grandfather    • Alzheimer's disease Maternal Grandmother    • Lung cancer Paternal Grandfather    • Alzheimer's disease Paternal Grandmother    • Alcohol abuse Paternal Grandmother    • Anxiety disorder Sister    • Depression Sister    • Anxiety disorder Paternal Aunt    • Depression Paternal Aunt    • No Known Problems Maternal Uncle    • No Known Problems Paternal Uncle    • Heart attack Paternal Uncle    • Other Paternal Uncle        Past Surgical History:  Past Surgical History:    Procedure Laterality Date   • TONSILLECTOMY         Problem List:  Patient Active Problem List   Diagnosis   • Major depressive disorder, recurrent, moderate ([facility])   • Class 3 severe obesity without serious comorbidity with body mass index (BMI) of 50.0 to 59.9 in adult ([facility])   • Smoker   • Migraine without aura and without status migrainosus, not intractable   • Generalized anxiety disorder   • History of sleep apnea       Allergy:   No Known Allergies     Current Medications:   Current Outpatient Medications   Medication Sig Dispense Refill   • ARIPiprazole (Abilify) 15 MG tablet Take 0.5 tablets by mouth Daily. 15 tablet 0   • buPROPion XL (WELLBUTRIN XL) 150 MG 24 hr tablet Take 1 tablet by mouth Every Morning. 30 tablet 0   • buPROPion XL (WELLBUTRIN XL) 300 MG 24 hr tablet Take 1 tablet by mouth Every Morning. 30 tablet 0   • desvenlafaxine (PRISTIQ) 100 MG 24 hr tablet Take 1 tablet by mouth Daily. 30 tablet 0   • prazosin (MINIPRESS) 1 MG capsule Take 1 capsule by mouth Every Night. 30 capsule 0   • SUMAtriptan (Imitrex) 25 MG tablet Take one tablet at onset of headache. May repeat dose one time in 2 hours if headache not relieved. 20 tablet 0     No current facility-administered medications for this visit.       Review of Symptoms:    Review of Systems   Constitutional: Positive for fatigue.   HENT: Negative.    Eyes: Negative.    Respiratory: Negative.    Cardiovascular: Negative.    Gastrointestinal: Negative.    Genitourinary: Negative.    Musculoskeletal: Negative.    Skin: Negative.    Neurological: Negative.    Psychiatric/Behavioral: Positive for decreased concentration, sleep disturbance, depressed mood and stress. Negative for agitation, behavioral problems, dysphoric mood, hallucinations, self-injury, suicidal ideas and negative for hyperactivity. The patient is nervous/anxious.          Physical Exam:   There were no vitals taken for this visit. There is no height or weight on file to calculate  BMI.   Due to the remote nature of this encounter (virtual encounter), vitals were unable to be obtained.  Height stated at 69 inches.  Weight stated at 400 pounds.      Physical Exam  Constitutional:       Appearance: Normal appearance.   Neurological:      Mental Status: She is alert and oriented to person, place, and time.   Psychiatric:         Attention and Perception: Attention normal.         Mood and Affect: Mood normal. Affect is blunt.         Speech: Speech normal.         Behavior: Behavior normal. Behavior is cooperative.         Thought Content: Thought content normal. Thought content is not paranoid or delusional. Thought content does not include homicidal or suicidal ideation. Thought content does not include homicidal or suicidal plan.         Cognition and Memory: Cognition and memory normal.           Mental Status Exam:   Hygiene:   good  Cooperation:  Cooperative  Eye Contact:  Good  Psychomotor Behavior:  Appropriate  Affect:  Blunted  Mood: normal  Hopelessness: Denies  Speech:  Monotone  Thought Process:  Linear  Thought Content:  Mood congruent  Suicidal:  None  Homicidal:  None  Hallucinations:  None  Delusion:  None  Memory:  Intact  Orientation:  Person, Place, Time and Situation  Reliability:  good  Insight:  Good  Judgement:  Good  Impulse Control:  Good  Physical/Medical Issues:  No           Lab Results:   No visits with results within 1 Month(s) from this visit.   Latest known visit with results is:   Office Visit on 01/12/2022   Component Date Value Ref Range Status   • Glucose 01/12/2022 95  65 - 99 mg/dL Final   • BUN 01/12/2022 9  6 - 20 mg/dL Final   • Creatinine 01/12/2022 1.01 (A) 0.57 - 1.00 mg/dL Final   • Sodium 01/12/2022 142  136 - 145 mmol/L Final   • Potassium 01/12/2022 4.2  3.5 - 5.2 mmol/L Final   • Chloride 01/12/2022 106  98 - 107 mmol/L Final   • CO2 01/12/2022 27.3  22.0 - 29.0 mmol/L Final   • Calcium 01/12/2022 9.2  8.6 - 10.5 mg/dL Final   • Total Protein  01/12/2022 7.5  6.0 - 8.5 g/dL Final   • Albumin 01/12/2022 4.23  3.50 - 5.20 g/dL Final   • ALT (SGPT) 01/12/2022 32  1 - 33 U/L Final   • AST (SGOT) 01/12/2022 20  1 - 32 U/L Final   • Alkaline Phosphatase 01/12/2022 82  39 - 117 U/L Final   • Total Bilirubin 01/12/2022 0.3  0.0 - 1.2 mg/dL Final   • eGFR Non  Amer 01/12/2022 67  >60 mL/min/1.73 Final   • Globulin 01/12/2022 3.3  gm/dL Final   • A/G Ratio 01/12/2022 1.3  g/dL Final   • BUN/Creatinine Ratio 01/12/2022 8.9  7.0 - 25.0 Final   • Anion Gap 01/12/2022 8.7  5.0 - 15.0 mmol/L Final   • Total Cholesterol 01/12/2022 145  0 - 200 mg/dL Final   • Triglycerides 01/12/2022 100  0 - 150 mg/dL Final   • HDL Cholesterol 01/12/2022 33 (A) 40 - 60 mg/dL Final   • LDL Cholesterol  01/12/2022 93  0 - 100 mg/dL Final   • VLDL Cholesterol 01/12/2022 19  5 - 40 mg/dL Final   • LDL/HDL Ratio 01/12/2022 2.79   Final   • TSH 01/12/2022 2.790  0.270 - 4.200 uIU/mL Final   • Color 01/12/2022 Dark Yellow  Yellow, Straw, Dark Yellow, Kizzy Final   • Clarity, UA 01/12/2022 Clear  Clear Final   • Glucose, UA 01/12/2022 Negative  Negative, 1000 mg/dL (3+) mg/dL Final   • Bilirubin 01/12/2022 Negative  Negative Final   • Ketones, UA 01/12/2022 Negative  Negative Final   • Specific Gravity  01/12/2022 1.030  1.005 - 1.030 Final   • Blood, UA 01/12/2022 3+ (A) Negative Final   • pH, Urine 01/12/2022 6.0  5.0 - 8.0 Final   • Protein, POC 01/12/2022 Trace (A) Negative mg/dL Final   • Urobilinogen, UA 01/12/2022 Normal  Normal Final   • Leukocytes 01/12/2022 Negative  Negative Final   • Nitrite, UA 01/12/2022 Positive (A) Negative Final   • WBC 01/12/2022 9.57  3.40 - 10.80 10*3/mm3 Final   • RBC 01/12/2022 5.31 (A) 3.77 - 5.28 10*6/mm3 Final   • Hemoglobin 01/12/2022 12.5  12.0 - 15.9 g/dL Final   • Hematocrit 01/12/2022 42.0  34.0 - 46.6 % Final   • MCV 01/12/2022 79.1  79.0 - 97.0 fL Final   • MCH 01/12/2022 23.5 (A) 26.6 - 33.0 pg Final   • MCHC 01/12/2022 29.8 (A) 31.5 -  35.7 g/dL Final   • RDW 01/12/2022 16.5 (A) 12.3 - 15.4 % Final   • RDW-SD 01/12/2022 46.5  37.0 - 54.0 fl Final   • MPV 01/12/2022 10.8  6.0 - 12.0 fL Final   • Platelets 01/12/2022 347  140 - 450 10*3/mm3 Final   • Neutrophil % 01/12/2022 58.5  42.7 - 76.0 % Final   • Lymphocyte % 01/12/2022 31.3  19.6 - 45.3 % Final   • Monocyte % 01/12/2022 6.4  5.0 - 12.0 % Final   • Eosinophil % 01/12/2022 3.4  0.3 - 6.2 % Final   • Basophil % 01/12/2022 0.2  0.0 - 1.5 % Final   • Immature Grans % 01/12/2022 0.2  0.0 - 0.5 % Final   • Neutrophils, Absolute 01/12/2022 5.59  1.70 - 7.00 10*3/mm3 Final   • Lymphocytes, Absolute 01/12/2022 3.00  0.70 - 3.10 10*3/mm3 Final   • Monocytes, Absolute 01/12/2022 0.61  0.10 - 0.90 10*3/mm3 Final   • Eosinophils, Absolute 01/12/2022 0.33  0.00 - 0.40 10*3/mm3 Final   • Basophils, Absolute 01/12/2022 0.02  0.00 - 0.20 10*3/mm3 Final   • Immature Grans, Absolute 01/12/2022 0.02  0.00 - 0.05 10*3/mm3 Final   • nRBC 01/12/2022 0.0  0.0 - 0.2 /100 WBC Final         Assessment/Plan   Problems Addressed this Visit    None     Visit Diagnoses     Major depressive disorder, recurrent episode, moderate (HCC)  (Chronic)   -  Primary    Relevant Medications    desvenlafaxine (PRISTIQ) 100 MG 24 hr tablet    buPROPion XL (WELLBUTRIN XL) 300 MG 24 hr tablet    buPROPion XL (WELLBUTRIN XL) 150 MG 24 hr tablet    ARIPiprazole (Abilify) 15 MG tablet    Anxiety disorder, unspecified type  (Chronic)       Relevant Medications    desvenlafaxine (PRISTIQ) 100 MG 24 hr tablet    buPROPion XL (WELLBUTRIN XL) 300 MG 24 hr tablet    buPROPion XL (WELLBUTRIN XL) 150 MG 24 hr tablet    ARIPiprazole (Abilify) 15 MG tablet    Post traumatic stress disorder (PTSD)  (Chronic)       Relevant Medications    desvenlafaxine (PRISTIQ) 100 MG 24 hr tablet    buPROPion XL (WELLBUTRIN XL) 300 MG 24 hr tablet    buPROPion XL (WELLBUTRIN XL) 150 MG 24 hr tablet    ARIPiprazole (Abilify) 15 MG tablet    Nightmares        Relevant  Medications    prazosin (MINIPRESS) 1 MG capsule    desvenlafaxine (PRISTIQ) 100 MG 24 hr tablet    buPROPion XL (WELLBUTRIN XL) 300 MG 24 hr tablet    buPROPion XL (WELLBUTRIN XL) 150 MG 24 hr tablet    ARIPiprazole (Abilify) 15 MG tablet      Diagnoses       Codes Comments    Major depressive disorder, recurrent episode, moderate (HCC)    -  Primary ICD-10-CM: F33.1  ICD-9-CM: 296.32     Anxiety disorder, unspecified type     ICD-10-CM: F41.9  ICD-9-CM: 300.00     Post traumatic stress disorder (PTSD)     ICD-10-CM: F43.10  ICD-9-CM: 309.81     Nightmares     ICD-10-CM: F51.5  ICD-9-CM: 307.47           Visit Diagnoses:    ICD-10-CM ICD-9-CM   1. Major depressive disorder, recurrent episode, moderate (HCC)  F33.1 296.32   2. Anxiety disorder, unspecified type  F41.9 300.00   3. Post traumatic stress disorder (PTSD)  F43.10 309.81   4. Nightmares  F51.5 307.47          GOALS:  Short Term Goals: Patient will be compliant with medication, and patient will have no significant medication related side effects.  Patient will be engaged in psychotherapy as indicated.  Patient will report subjective improvement of symptoms.  Long term goals: To stabilize mood and treat/improve subjective symptoms, the patient will stay out of the hospital, the patient will be at an optimal level of functioning, and the patient will take all medications as prescribed.  The patient verbalized understanding and agreement with goals that were mutually set.      TREATMENT PLAN: Restart supportive psychotherapy efforts and TAKE  medications as indicated.  The patient declines restarting psychotherapy at today's encounter.  Medication and treatment options, both pharmacological and non-pharmacological treatment options, discussed during today's visit, including any off label use of medication. Patient acknowledged and verbally consented with current treatment plan and was educated on the importance of compliance with treatment and follow-up  appointments.      - Continue Abilify 7.5 mg by mouth once daily for mood.  - Continue Wellbutrin  mg by mouth once daily for mood.  - Continue prazosin 1 mg by mouth once daily at bedtime for PTSD nightmares.  - Continue Pristiq 100 mg by mouth once daily for mood.      MEDICATION ISSUES:  Discussed medication options and treatment plan of prescribed medication, any off label use of medication, as well as the risks, benefits, any black box warnings including increased suicidality, and side effects including but not limited to potential falls, dizziness, possible impaired driving, GI side effects (change in appetite, abdominal discomfort, nausea, vomiting, diarrhea, and/or constipation), dry mouth, somnolence, sedation, insomnia, activation, agitation, irritation, tremors, abnormal muscle movements or disorders, tardive dyskinesia, akathisia, asthenia, headache, sweating, possible bruising or rare bleeding, electrolyte and/or fluid abnormalities, change in blood pressure/heart rate/and or heart rhythm, hypotension, sexual dysfunction, rare impulse control problems, rare seizures, rare neuroleptic malignant syndrome, increased risk of death and cerebrovascular events, change in blood glucose and increased risk for diabetes, change in triglycerides and cholesterol and increased risk for dyslipidemia,  weight gain, weight gain that can become problematic to health, skin conditions and reactions, and metabolic adversities among others. Patient and/or guardian are agreeable to call the office with any worsening of symptoms or onset of side effects, or if any concerns or questions arise.  The contact information for the office is made available to the patient and/or guardian. Patient and/or guardian are agreeable to call 911 or go to the nearest ER should they begin having any SI/HI, or if any urgent concerns arise.      SUICIDE RISK ASSESSMENT AND SAFETY PLAN: Unalterable demographics and a history of mental health  intervention indicate this patient is in a high risk category compared to the general population. At present, the patient denies active SI/HI, intentions, or plans at this time and agrees to seek immediate care should such thoughts develop. The patient verbalizes understanding of how to access emergency care if needed and agrees to do so. Consideration of suicide risk and protective factors such as history, current presentation, individual strengths and weaknesses, psychosocial and environmental stressors and variables, psychiatric illness and symptoms, medical conditions and pain, took place in this interview. Based on those considerations, the patient is determined: within individual baseline and presenting no imminent risk for suicide or homicide. Other recommendations: The patient does not meet the criteria for inpatient admission and is not a safety risk to self or others at today's visit. Inpatient treatment offers no significant advantages over outpatient treatment for this patient at today's visit.  The patient was given ample time for questions and fully participated in treatment planning.  The patient was encouraged to call the clinic with any questions or concerns.  The patient was informed of access to emergency care. If patient were to develop any significant symptomatology, suicidal ideation, homicidal ideation, any concerns, or feel unsafe at any time they are to call the clinic and if unable to get immediate assistance should immediately call 911 or go to the nearest emergency room.  Patient contracted verbally for the following: If you are experiencing an emotional crisis or have thoughts of harming yourself or others, please go to your nearest local emergency room or call 911. Will continue to re-assess medication response and side effects frequently to establish efficacy and ensure safety. Risks, any black box warnings, side effects, off label usage, and benefits of medication and treatment  discussed with patient, along with potential adverse side effects of current and/or newly prescribed medication, alternative treatment options, and OTC medications.  Patient verbalized understanding of potential risks, any off label use of medication, any black box warnings, and any side effects in their own words. The patient verbalized understanding and agreed to comply with the safety plan discussed in their own words.  Patient given the number to the office. Number also discussed of the 24- hour suicide hotline.         MEDS ORDERED DURING VISIT:  New Medications Ordered This Visit   Medications   • prazosin (MINIPRESS) 1 MG capsule     Sig: Take 1 capsule by mouth Every Night.     Dispense:  30 capsule     Refill:  0   • desvenlafaxine (PRISTIQ) 100 MG 24 hr tablet     Sig: Take 1 tablet by mouth Daily.     Dispense:  30 tablet     Refill:  0   • buPROPion XL (WELLBUTRIN XL) 300 MG 24 hr tablet     Sig: Take 1 tablet by mouth Every Morning.     Dispense:  30 tablet     Refill:  0   • buPROPion XL (WELLBUTRIN XL) 150 MG 24 hr tablet     Sig: Take 1 tablet by mouth Every Morning.     Dispense:  30 tablet     Refill:  0   • ARIPiprazole (Abilify) 15 MG tablet     Sig: Take 0.5 tablets by mouth Daily.     Dispense:  15 tablet     Refill:  0       Return in about 4 weeks (around 4/19/2022), or if symptoms worsen or fail to improve, for Next scheduled follow up and Recheck.         Functional Status: Moderate impairment     Prognosis: Fair with Ongoing Treatment             This document has been electronically signed by ELISE Dye  March 22, 2022 12:22 EDT    Some of the data in this electronic note has been brought forward from a previous encounter, any necessary changes have been made, it has been reviewed by this APRN, and it is accurate.    Please note that portions of this note were completed with a voice recognition program.

## 2022-04-08 DIAGNOSIS — F51.5 NIGHTMARES: ICD-10-CM

## 2022-04-11 RX ORDER — PRAZOSIN HYDROCHLORIDE 1 MG/1
1 CAPSULE ORAL NIGHTLY
Qty: 30 CAPSULE | Refills: 0 | Status: SHIPPED | OUTPATIENT
Start: 2022-04-11 | End: 2022-04-18 | Stop reason: SDUPTHER

## 2022-04-15 ENCOUNTER — HOSPITAL ENCOUNTER (EMERGENCY)
Dept: HOSPITAL 79 - ER1 | Age: 27
Discharge: HOME | End: 2022-04-15
Payer: COMMERCIAL

## 2022-04-15 DIAGNOSIS — R31.9: ICD-10-CM

## 2022-04-15 DIAGNOSIS — N39.0: Primary | ICD-10-CM

## 2022-04-15 DIAGNOSIS — F17.200: ICD-10-CM

## 2022-04-15 LAB
BUN/CREATININE RATIO: 15 (ref 0–10)
HGB BLD-MCNC: 12.7 GM/DL (ref 12.3–15.3)
RED BLOOD COUNT: 5.24 M/UL (ref 4–5.1)
WHITE BLOOD COUNT: 12.7 K/UL (ref 4.5–11)

## 2022-04-18 ENCOUNTER — TELEMEDICINE (OUTPATIENT)
Dept: PSYCHIATRY | Facility: CLINIC | Age: 27
End: 2022-04-18

## 2022-04-18 DIAGNOSIS — F51.5 NIGHTMARES: ICD-10-CM

## 2022-04-18 DIAGNOSIS — F43.10 POST TRAUMATIC STRESS DISORDER (PTSD): Chronic | ICD-10-CM

## 2022-04-18 DIAGNOSIS — F33.1 MAJOR DEPRESSIVE DISORDER, RECURRENT EPISODE, MODERATE: Primary | Chronic | ICD-10-CM

## 2022-04-18 DIAGNOSIS — F41.9 ANXIETY DISORDER, UNSPECIFIED TYPE: Chronic | ICD-10-CM

## 2022-04-18 PROCEDURE — 99214 OFFICE O/P EST MOD 30 MIN: CPT | Performed by: NURSE PRACTITIONER

## 2022-04-18 RX ORDER — BUPROPION HYDROCHLORIDE 300 MG/1
300 TABLET ORAL EVERY MORNING
Qty: 30 TABLET | Refills: 0 | Status: SHIPPED | OUTPATIENT
Start: 2022-04-18 | End: 2022-06-07 | Stop reason: SDUPTHER

## 2022-04-18 RX ORDER — BUPROPION HYDROCHLORIDE 150 MG/1
150 TABLET ORAL EVERY MORNING
Qty: 30 TABLET | Refills: 0 | Status: SHIPPED | OUTPATIENT
Start: 2022-04-18 | End: 2022-06-07 | Stop reason: SDUPTHER

## 2022-04-18 RX ORDER — PRAZOSIN HYDROCHLORIDE 1 MG/1
1 CAPSULE ORAL NIGHTLY
Qty: 30 CAPSULE | Refills: 0 | Status: SHIPPED | OUTPATIENT
Start: 2022-04-18 | End: 2022-06-07 | Stop reason: SDUPTHER

## 2022-04-18 RX ORDER — ARIPIPRAZOLE 15 MG/1
7.5 TABLET ORAL DAILY
Qty: 15 TABLET | Refills: 0 | Status: SHIPPED | OUTPATIENT
Start: 2022-04-18 | End: 2022-06-07 | Stop reason: SDUPTHER

## 2022-04-18 RX ORDER — DESVENLAFAXINE 100 MG/1
100 TABLET, EXTENDED RELEASE ORAL DAILY
Qty: 30 TABLET | Refills: 0 | Status: SHIPPED | OUTPATIENT
Start: 2022-04-18 | End: 2022-06-07 | Stop reason: SDUPTHER

## 2022-04-18 NOTE — PROGRESS NOTES
This provider is located at the [facility] (through [facility]), [address], [facility], [address] using a secure [employer] Video Visit through [employer]. Patient is being seen remotely via telehealth at their home address in [place], and stated they are in a secure environment for this session. The patient's condition being diagnosed/treated is appropriate for telemedicine. The provider identified herself as well as her credentials.   The patient, and/or patients guardian, consent to be seen remotely, and when consent is given they understand that the consent allows for patient identifiable information to be sent to a third party as needed.   They may refuse to be seen remotely at any time. The electronic data is encrypted and password protected, and the patient and/or guardian has been advised of the potential risks to privacy not withstanding such measures.    You have chosen to receive care through a telehealth visit.  Do you consent to use a video/audio connection for your medical care today? Yes        Subjective   Kamryn Gross is a 26 y.o. female who presents today for follow up    Chief Complaint: Depression, anxiety, sleeping difficulties, and history of nightmares    Accompanied by: The patient reports she is alone at today's encounter    History of Present Illness:   The patient describes her mood as doing good overall since her last encounter with this APRN.  The patient states she has had some increased stress and anxiety over looking for a job, but has not an interview later today at a [employer] she is excited about..  The patient rates her symptoms of depression at a 3/10 on a 0-10 scale, with 10 being the worst.  The patient rates her symptoms of anxiety at a 6/10 on a 0-10 scale, with 10 being the worst.  The patient reports her anxiety symptoms is higher due to looking for a new job.  The patient reports her appetite as good.  The patient reports her  "sleep as fair.  The patient states she is supposed to wear a cpap machine, but doesn't wear it because it \"suffocates me\".  This APRN and the patient discussed talking to her provider and the medical supply company regarding possibly trying a different mask as the patient reports she has not been able to tolerate her current full face mask, and this APRN has encouraged CPAP use.  The risks of untreated sleep apnea are discussed with the patient, including both medical and psychological effects.  The patient denies any nightmares.  She reports she actually has not had any dreams at all recently.  The patient denies any other new medical problems or changes in medications since last appointment with this facility.  The patient reports compliance with her current medication regimen.  The patient denies any side effects or concerns from her current medication regimen.   The patient would like to not adjust or change her medications at this visit as she feels her current psychiatric symptoms and moods are controlled on her current medication regimen, and she feels she is at her typical baseline.  The patient denies any suicidal or homicidal ideations, plans, or intent at today's encounter and is convincing.  The patient reports she occasionally sees a shadowy dark figure out of the corner of her eye, but this is much less frequent than it used to be.  The patient denies any other auditory hallucinations or visual hallucinations.  The patient does not endorse any significant symptoms consistent with matt or psychosis at today's encounter.      Prior Psychiatric Medications:  Prozac  Zoloft  Trileptal  Paxil  Hydroxyzine  Prazosin  Desvenlafaxine  Wellbutrin XL  Abilify        Last Menstrual Period:  None in a couple of months, the patient denies any chance of pregnancy at this time.  The patient was educated that her prescribed medications can have potential risk to a developing fetus. The patient is advised to contact " this APRN/this office if she becomes pregnant or plans to become pregnant.  Pt verbalizes understanding and acknowledged agreement with this plan in her own words.        The following portions of the patient's history were reviewed and updated as appropriate: allergies, current medications, past family history, past medical history, past social history, past surgical history and problem list.          Past Medical History:  Past Medical History:   Diagnosis Date   • Anxiety    • Bipolar disorder (HCC)    • Depression    • Headache    • Obesity    • Panic disorder    • PTSD (post-traumatic stress disorder)    • Self-injurious behavior    • Sleep apnea    • Suicide attempt (HCC)     attempted overdose in 2017       Social History:  Social History     Socioeconomic History   • Marital status: Single   Tobacco Use   • Smoking status: Current Every Day Smoker     Packs/day: 0.50     Years: 8.00     Pack years: 4.00     Start date: 2014   • Smokeless tobacco: Never Used   Vaping Use   • Vaping Use: Never used   Substance and Sexual Activity   • Alcohol use: Not Currently   • Drug use: Not Currently   • Sexual activity: Yes     Partners: Male     Birth control/protection: None       Family History:  Family History   Problem Relation Age of Onset   • Diabetes Mother    • Anxiety disorder Mother    • Depression Mother    • Anxiety disorder Father    • Depression Father    • Anxiety disorder Sister    • Depression Sister    • Depression Maternal Aunt    • Heart attack Paternal Aunt    • Anxiety disorder Paternal Aunt    • Depression Paternal Aunt    • No Known Problems Maternal Uncle    • No Known Problems Paternal Uncle    • Lung disease Maternal Grandfather    • Alzheimer's disease Maternal Grandmother    • Lung cancer Paternal Grandfather    • Alzheimer's disease Paternal Grandmother    • Alcohol abuse Paternal Grandmother    • Anxiety disorder Sister    • Depression Sister    • Anxiety disorder Paternal Aunt    •  Depression Paternal Aunt    • No Known Problems Maternal Uncle    • No Known Problems Paternal Uncle    • Heart attack Paternal Uncle    • Other Paternal Uncle        Past Surgical History:  Past Surgical History:   Procedure Laterality Date   • TONSILLECTOMY         Problem List:  Patient Active Problem List   Diagnosis   • Major depressive disorder, recurrent, moderate ([facility])   • Class 3 severe obesity without serious comorbidity with body mass index (BMI) of 50.0 to 59.9 in adult ([facility])   • Smoker   • Migraine without aura and without status migrainosus, not intractable   • Generalized anxiety disorder   • History of sleep apnea       Allergy:   No Known Allergies     Current Medications:   Current Outpatient Medications   Medication Sig Dispense Refill   • ARIPiprazole (Abilify) 15 MG tablet Take 0.5 tablets by mouth Daily. 15 tablet 0   • buPROPion XL (WELLBUTRIN XL) 150 MG 24 hr tablet Take 1 tablet by mouth Every Morning. 30 tablet 0   • buPROPion XL (WELLBUTRIN XL) 300 MG 24 hr tablet Take 1 tablet by mouth Every Morning. 30 tablet 0   • desvenlafaxine (PRISTIQ) 100 MG 24 hr tablet Take 1 tablet by mouth Daily. 30 tablet 0   • prazosin (MINIPRESS) 1 MG capsule Take 1 capsule by mouth Every Night. 30 capsule 0   • SUMAtriptan (Imitrex) 25 MG tablet Take one tablet at onset of headache. May repeat dose one time in 2 hours if headache not relieved. 20 tablet 0     No current facility-administered medications for this visit.       Review of Symptoms:    Review of Systems   Constitutional: Positive for fatigue.   HENT: Negative.    Eyes: Negative.    Respiratory: Negative.    Cardiovascular: Negative.    Gastrointestinal: Negative.    Genitourinary: Negative.    Musculoskeletal: Negative.    Skin: Negative.    Neurological: Negative.    Psychiatric/Behavioral: Positive for sleep disturbance, depressed mood and stress. Negative for agitation, behavioral problems, decreased concentration, dysphoric mood, self-injury,  suicidal ideas and negative for hyperactivity. The patient is nervous/anxious.          Physical Exam:   There were no vitals taken for this visit. There is no height or weight on file to calculate BMI.   Due to the remote nature of this encounter (virtual encounter), vitals were unable to be obtained.  Height stated at 69 inches.  Weight stated at 350-400, but most recently at 400 pounds.      Physical Exam  Constitutional:       Appearance: Normal appearance.   Neurological:      Mental Status: She is alert and oriented to person, place, and time.   Psychiatric:         Attention and Perception: Attention normal.         Mood and Affect: Mood and affect normal.         Speech: Speech normal.         Behavior: Behavior normal. Behavior is cooperative.         Thought Content: Thought content normal. Thought content is not paranoid or delusional. Thought content does not include homicidal or suicidal ideation. Thought content does not include homicidal or suicidal plan.         Cognition and Memory: Cognition and memory normal.         Judgment: Judgment normal.           Mental Status Exam:   Hygiene:   good  Cooperation:  Cooperative  Eye Contact:  Good  Psychomotor Behavior:  Appropriate  Affect:  Appropriate  Mood: normal  Hopelessness: Denies  Speech:  Monotone  Thought Process:  Linear  Thought Content:  Mood congruent  Suicidal:  None  Homicidal:  None  Hallucinations:  None  Delusion:  None  Memory:  Intact  Orientation:  Person, Place, Time and Situation  Reliability:  good  Insight:  Good  Judgement:  Good  Impulse Control:  Good  Physical/Medical Issues:  No           Lab Results:   No visits with results within 1 Month(s) from this visit.   Latest known visit with results is:   Office Visit on 01/12/2022   Component Date Value Ref Range Status   • Glucose 01/12/2022 95  65 - 99 mg/dL Final   • BUN 01/12/2022 9  6 - 20 mg/dL Final   • Creatinine 01/12/2022 1.01 (A) 0.57 - 1.00 mg/dL Final   • Sodium  01/12/2022 142  136 - 145 mmol/L Final   • Potassium 01/12/2022 4.2  3.5 - 5.2 mmol/L Final   • Chloride 01/12/2022 106  98 - 107 mmol/L Final   • CO2 01/12/2022 27.3  22.0 - 29.0 mmol/L Final   • Calcium 01/12/2022 9.2  8.6 - 10.5 mg/dL Final   • Total Protein 01/12/2022 7.5  6.0 - 8.5 g/dL Final   • Albumin 01/12/2022 4.23  3.50 - 5.20 g/dL Final   • ALT (SGPT) 01/12/2022 32  1 - 33 U/L Final   • AST (SGOT) 01/12/2022 20  1 - 32 U/L Final   • Alkaline Phosphatase 01/12/2022 82  39 - 117 U/L Final   • Total Bilirubin 01/12/2022 0.3  0.0 - 1.2 mg/dL Final   • eGFR Non  Amer 01/12/2022 67  >60 mL/min/1.73 Final   • Globulin 01/12/2022 3.3  gm/dL Final   • A/G Ratio 01/12/2022 1.3  g/dL Final   • BUN/Creatinine Ratio 01/12/2022 8.9  7.0 - 25.0 Final   • Anion Gap 01/12/2022 8.7  5.0 - 15.0 mmol/L Final   • Total Cholesterol 01/12/2022 145  0 - 200 mg/dL Final   • Triglycerides 01/12/2022 100  0 - 150 mg/dL Final   • HDL Cholesterol 01/12/2022 33 (A) 40 - 60 mg/dL Final   • LDL Cholesterol  01/12/2022 93  0 - 100 mg/dL Final   • VLDL Cholesterol 01/12/2022 19  5 - 40 mg/dL Final   • LDL/HDL Ratio 01/12/2022 2.79   Final   • TSH 01/12/2022 2.790  0.270 - 4.200 uIU/mL Final   • Color 01/12/2022 Dark Yellow  Yellow, Straw, Dark Yellow, Kizzy Final   • Clarity, UA 01/12/2022 Clear  Clear Final   • Glucose, UA 01/12/2022 Negative  Negative, 1000 mg/dL (3+) mg/dL Final   • Bilirubin 01/12/2022 Negative  Negative Final   • Ketones, UA 01/12/2022 Negative  Negative Final   • Specific Gravity  01/12/2022 1.030  1.005 - 1.030 Final   • Blood, UA 01/12/2022 3+ (A) Negative Final   • pH, Urine 01/12/2022 6.0  5.0 - 8.0 Final   • Protein, POC 01/12/2022 Trace (A) Negative mg/dL Final   • Urobilinogen, UA 01/12/2022 Normal  Normal Final   • Leukocytes 01/12/2022 Negative  Negative Final   • Nitrite, UA 01/12/2022 Positive (A) Negative Final   • WBC 01/12/2022 9.57  3.40 - 10.80 10*3/mm3 Final   • RBC 01/12/2022 5.31 (A) 3.77  - 5.28 10*6/mm3 Final   • Hemoglobin 01/12/2022 12.5  12.0 - 15.9 g/dL Final   • Hematocrit 01/12/2022 42.0  34.0 - 46.6 % Final   • MCV 01/12/2022 79.1  79.0 - 97.0 fL Final   • MCH 01/12/2022 23.5 (A) 26.6 - 33.0 pg Final   • MCHC 01/12/2022 29.8 (A) 31.5 - 35.7 g/dL Final   • RDW 01/12/2022 16.5 (A) 12.3 - 15.4 % Final   • RDW-SD 01/12/2022 46.5  37.0 - 54.0 fl Final   • MPV 01/12/2022 10.8  6.0 - 12.0 fL Final   • Platelets 01/12/2022 347  140 - 450 10*3/mm3 Final   • Neutrophil % 01/12/2022 58.5  42.7 - 76.0 % Final   • Lymphocyte % 01/12/2022 31.3  19.6 - 45.3 % Final   • Monocyte % 01/12/2022 6.4  5.0 - 12.0 % Final   • Eosinophil % 01/12/2022 3.4  0.3 - 6.2 % Final   • Basophil % 01/12/2022 0.2  0.0 - 1.5 % Final   • Immature Grans % 01/12/2022 0.2  0.0 - 0.5 % Final   • Neutrophils, Absolute 01/12/2022 5.59  1.70 - 7.00 10*3/mm3 Final   • Lymphocytes, Absolute 01/12/2022 3.00  0.70 - 3.10 10*3/mm3 Final   • Monocytes, Absolute 01/12/2022 0.61  0.10 - 0.90 10*3/mm3 Final   • Eosinophils, Absolute 01/12/2022 0.33  0.00 - 0.40 10*3/mm3 Final   • Basophils, Absolute 01/12/2022 0.02  0.00 - 0.20 10*3/mm3 Final   • Immature Grans, Absolute 01/12/2022 0.02  0.00 - 0.05 10*3/mm3 Final   • nRBC 01/12/2022 0.0  0.0 - 0.2 /100 WBC Final         Assessment/Plan   Problems Addressed this Visit    None     Visit Diagnoses     Major depressive disorder, recurrent episode, moderate (HCC)  (Chronic)   -  Primary    Relevant Medications    desvenlafaxine (PRISTIQ) 100 MG 24 hr tablet    buPROPion XL (WELLBUTRIN XL) 300 MG 24 hr tablet    buPROPion XL (WELLBUTRIN XL) 150 MG 24 hr tablet    ARIPiprazole (Abilify) 15 MG tablet    Anxiety disorder, unspecified type  (Chronic)       Relevant Medications    desvenlafaxine (PRISTIQ) 100 MG 24 hr tablet    buPROPion XL (WELLBUTRIN XL) 300 MG 24 hr tablet    buPROPion XL (WELLBUTRIN XL) 150 MG 24 hr tablet    ARIPiprazole (Abilify) 15 MG tablet    Post traumatic stress disorder  (PTSD)  (Chronic)       Relevant Medications    desvenlafaxine (PRISTIQ) 100 MG 24 hr tablet    buPROPion XL (WELLBUTRIN XL) 300 MG 24 hr tablet    buPROPion XL (WELLBUTRIN XL) 150 MG 24 hr tablet    ARIPiprazole (Abilify) 15 MG tablet    Nightmares        Relevant Medications    prazosin (MINIPRESS) 1 MG capsule    desvenlafaxine (PRISTIQ) 100 MG 24 hr tablet    buPROPion XL (WELLBUTRIN XL) 300 MG 24 hr tablet    buPROPion XL (WELLBUTRIN XL) 150 MG 24 hr tablet    ARIPiprazole (Abilify) 15 MG tablet      Diagnoses       Codes Comments    Major depressive disorder, recurrent episode, moderate (HCC)    -  Primary ICD-10-CM: F33.1  ICD-9-CM: 296.32     Anxiety disorder, unspecified type     ICD-10-CM: F41.9  ICD-9-CM: 300.00     Post traumatic stress disorder (PTSD)     ICD-10-CM: F43.10  ICD-9-CM: 309.81     Nightmares     ICD-10-CM: F51.5  ICD-9-CM: 307.47           Visit Diagnoses:    ICD-10-CM ICD-9-CM   1. Major depressive disorder, recurrent episode, moderate (HCC)  F33.1 296.32   2. Anxiety disorder, unspecified type  F41.9 300.00   3. Post traumatic stress disorder (PTSD)  F43.10 309.81   4. Nightmares  F51.5 307.47          GOALS:  Short Term Goals: Patient will be compliant with medication, and patient will have no significant medication related side effects.  Patient will be engaged in psychotherapy as indicated.  Patient will report subjective improvement of symptoms.  Long term goals: To stabilize mood and treat/improve subjective symptoms, the patient will stay out of the hospital, the patient will be at an optimal level of functioning, and the patient will take all medications as prescribed.  The patient verbalized understanding and agreement with goals that were mutually set.      TREATMENT PLAN: Restart supportive psychotherapy efforts and TAKE  medications as indicated.  The patient declines restarting psychotherapy at today's encounter.  Medication and treatment options, both pharmacological and  non-pharmacological treatment options, discussed during today's visit, including any off label use of medication. Patient acknowledged and verbally consented with current treatment plan and was educated on the importance of compliance with treatment and follow-up appointments.      - Continue Abilify 7.5 mg by mouth once daily for mood.  - Continue Wellbutrin  mg by mouth once daily for mood.  - Continue prazosin 1 mg by mouth once daily at bedtime for PTSD nightmares.  - Continue Pristiq 100 mg by mouth once daily for mood.      MEDICATION ISSUES:  Discussed medication options and treatment plan of prescribed medication, any off label use of medication, as well as the risks, benefits, any black box warnings including increased suicidality, and side effects including but not limited to potential falls, dizziness, possible impaired driving, GI side effects (change in appetite, abdominal discomfort, nausea, vomiting, diarrhea, and/or constipation), dry mouth, somnolence, sedation, insomnia, activation, agitation, irritation, tremors, abnormal muscle movements or disorders, tardive dyskinesia, akathisia, asthenia, headache, sweating, possible bruising or rare bleeding, electrolyte and/or fluid abnormalities, change in blood pressure/heart rate/and or heart rhythm, hypotension, sexual dysfunction, rare impulse control problems, rare seizures, rare neuroleptic malignant syndrome, increased risk of death and cerebrovascular events, change in blood glucose and increased risk for diabetes, change in triglycerides and cholesterol and increased risk for dyslipidemia,  weight gain, weight gain that can become problematic to health, skin conditions and reactions, and metabolic adversities among others. Patient and/or guardian are agreeable to call the office with any worsening of symptoms or onset of side effects, or if any concerns or questions arise.  The contact information for the office is made available to the  patient and/or guardian. Patient and/or guardian are agreeable to call 911 or go to the nearest ER should they begin having any SI/HI, or if any urgent concerns arise.      SUICIDE RISK ASSESSMENT AND SAFETY PLAN: Unalterable demographics and a history of mental health intervention indicate this patient is in a high risk category compared to the general population. At present, the patient denies active SI/HI, intentions, or plans at this time and agrees to seek immediate care should such thoughts develop. The patient verbalizes understanding of how to access emergency care if needed and agrees to do so. Consideration of suicide risk and protective factors such as history, current presentation, individual strengths and weaknesses, psychosocial and environmental stressors and variables, psychiatric illness and symptoms, medical conditions and pain, took place in this interview. Based on those considerations, the patient is determined: within individual baseline and presenting no imminent risk for suicide or homicide. Other recommendations: The patient does not meet the criteria for inpatient admission and is not a safety risk to self or others at today's visit. Inpatient treatment offers no significant advantages over outpatient treatment for this patient at today's visit.  The patient was given ample time for questions and fully participated in treatment planning.  The patient was encouraged to call the clinic with any questions or concerns.  The patient was informed of access to emergency care. If patient were to develop any significant symptomatology, suicidal ideation, homicidal ideation, any concerns, or feel unsafe at any time they are to call the clinic and if unable to get immediate assistance should immediately call 911 or go to the nearest emergency room.  Patient contracted verbally for the following: If you are experiencing an emotional crisis or have thoughts of harming yourself or others, please go to  your nearest local emergency room or call 911. Will continue to re-assess medication response and side effects frequently to establish efficacy and ensure safety. Risks, any black box warnings, side effects, off label usage, and benefits of medication and treatment discussed with patient, along with potential adverse side effects of current and/or newly prescribed medication, alternative treatment options, and OTC medications.  Patient verbalized understanding of potential risks, any off label use of medication, any black box warnings, and any side effects in their own words. The patient verbalized understanding and agreed to comply with the safety plan discussed in their own words.  Patient given the number to the office. Number also discussed of the 24- hour suicide hotline.         MEDS ORDERED DURING VISIT:  New Medications Ordered This Visit   Medications   • prazosin (MINIPRESS) 1 MG capsule     Sig: Take 1 capsule by mouth Every Night.     Dispense:  30 capsule     Refill:  0   • desvenlafaxine (PRISTIQ) 100 MG 24 hr tablet     Sig: Take 1 tablet by mouth Daily.     Dispense:  30 tablet     Refill:  0   • buPROPion XL (WELLBUTRIN XL) 300 MG 24 hr tablet     Sig: Take 1 tablet by mouth Every Morning.     Dispense:  30 tablet     Refill:  0   • buPROPion XL (WELLBUTRIN XL) 150 MG 24 hr tablet     Sig: Take 1 tablet by mouth Every Morning.     Dispense:  30 tablet     Refill:  0   • ARIPiprazole (Abilify) 15 MG tablet     Sig: Take 0.5 tablets by mouth Daily.     Dispense:  15 tablet     Refill:  0       Return in about 4 weeks (around 5/16/2022), or if symptoms worsen or fail to improve, for Next scheduled follow up and Recheck.         Functional Status: Moderate impairment     Prognosis: Fair with Ongoing Treatment             This document has been electronically signed by ELISE Dye  April 18, 2022 10:24 EDT    Some of the data in this electronic note has been brought forward from a previous  encounter, any necessary changes have been made, it has been reviewed by this APRN, and it is accurate.    Please note that portions of this note were completed with a voice recognition program.

## 2022-06-07 ENCOUNTER — TELEMEDICINE (OUTPATIENT)
Dept: PSYCHIATRY | Facility: CLINIC | Age: 27
End: 2022-06-07

## 2022-06-07 DIAGNOSIS — F43.10 POST TRAUMATIC STRESS DISORDER (PTSD): Chronic | ICD-10-CM

## 2022-06-07 DIAGNOSIS — F41.9 ANXIETY DISORDER, UNSPECIFIED TYPE: Chronic | ICD-10-CM

## 2022-06-07 DIAGNOSIS — F51.5 NIGHTMARES: ICD-10-CM

## 2022-06-07 DIAGNOSIS — F33.1 MAJOR DEPRESSIVE DISORDER, RECURRENT EPISODE, MODERATE: Primary | Chronic | ICD-10-CM

## 2022-06-07 PROCEDURE — 99214 OFFICE O/P EST MOD 30 MIN: CPT | Performed by: NURSE PRACTITIONER

## 2022-06-07 RX ORDER — PRAZOSIN HYDROCHLORIDE 2 MG/1
2 CAPSULE ORAL NIGHTLY
Qty: 30 CAPSULE | Refills: 0 | Status: SHIPPED | OUTPATIENT
Start: 2022-06-07 | End: 2022-07-12 | Stop reason: SDUPTHER

## 2022-06-07 RX ORDER — DESVENLAFAXINE 100 MG/1
100 TABLET, EXTENDED RELEASE ORAL DAILY
Qty: 30 TABLET | Refills: 0 | Status: SHIPPED | OUTPATIENT
Start: 2022-06-07 | End: 2022-07-12

## 2022-06-07 RX ORDER — BUPROPION HYDROCHLORIDE 300 MG/1
300 TABLET ORAL EVERY MORNING
Qty: 30 TABLET | Refills: 0 | Status: SHIPPED | OUTPATIENT
Start: 2022-06-07 | End: 2022-07-12 | Stop reason: SDUPTHER

## 2022-06-07 RX ORDER — BUPROPION HYDROCHLORIDE 150 MG/1
150 TABLET ORAL EVERY MORNING
Qty: 30 TABLET | Refills: 0 | Status: SHIPPED | OUTPATIENT
Start: 2022-06-07 | End: 2022-07-12 | Stop reason: SDUPTHER

## 2022-06-07 RX ORDER — ARIPIPRAZOLE 15 MG/1
7.5 TABLET ORAL DAILY
Qty: 15 TABLET | Refills: 0 | Status: SHIPPED | OUTPATIENT
Start: 2022-06-07 | End: 2022-07-12 | Stop reason: SDUPTHER

## 2022-06-07 NOTE — PROGRESS NOTES
"This provider is located at the [facility] (through [facility]), [address], [place], [address] using a secure [employer] Video Visit through [employer]. Patient is being seen remotely via telehealth at their home address in [place], and stated they are in a secure environment for this session. The patient's condition being diagnosed/treated is appropriate for telemedicine. The provider identified herself as well as her credentials.   The patient, and/or patients guardian, consent to be seen remotely, and when consent is given they understand that the consent allows for patient identifiable information to be sent to a third party as needed.   They may refuse to be seen remotely at any time. The electronic data is encrypted and password protected, and the patient and/or guardian has been advised of the potential risks to privacy not withstanding such measures.    You have chosen to receive care through a telehealth visit.  Do you consent to use a video/audio connection for your medical care today? Yes        Subjective   Kamryn Gross is a 26 y.o. female who presents today for follow up    Chief Complaint: Depression, anxiety, history of PTSD, sleeping difficulties, and history of nightmares follow-up    Accompanied by: The patient reports she is alone at today's encounter    History of Present Illness:   The patient describes her mood as depressed over the last few weeks.  The patient states she did get and start the job at a [employer] locally, but had to quit due to \"my mental health\".  The patient reports she has decided to apply for disability.  This APRN discussed possible work from home jobs with the patient, but the patient reports she has never considered this and plans to try for disability instead.  The patient rates her symptoms of depression at a 7/10 on a 0-10 scale, with 10 being the worst.  The patient reports her symptoms of depression as wanting to cry " more, difficulty with sleeping, and just not taking good care of herself such as not showering like she should. The patient states she is not in any therapy at this time, and has not done therapy in a long time.  The patient reports she is interested in re-starting therapy, and verbalizes a request for this APRN to put a referral in for the patient to start psychotherapy.  The patient rates her symptoms of anxiety at a 4/10 on a 0-10 scale, with 10 being the worst.  The patient reports her symptoms of anxiety as biting on her nails and biting her gums more.  The patient reports her appetite as good.  She denies any recent weight loss or gain.  The patient reports her sleep as poor.  She reports she lays down to go to sleep, she has bad dreams, and then wakes back up.  She reports this causes her to take a long time to fall asleep at night.  She reports her insurance company took her CPAP back because she was not using it.  The patient does not report any other new medical problems or changes in medications since last appointment with this facility.  The patient reports compliance with her current medication regimen.  The patient denies any side effects, abnormal musculoskeletal movements, or concerns from her current medication regimen.   The patient would like to adjust her medications at this visit to help with her sleep, nightmares, and moods.  The patient denies any suicidal or homicidal ideations, plans, or intent at today's encounter and is convincing.  The patient denies any auditory hallucinations or visual hallucinations.  The patient does not endorse any significant symptoms consistent with matt or psychosis at today's encounter.        Prior Psychiatric Medications:  Prozac  Zoloft  Trileptal  Paxil  Hydroxyzine  Prazosin  Desvenlafaxine  Wellbutrin XL  Abilify        Last Menstrual Period:  6/16/22 - 6/22/22, the patient denies any chance of pregnancy at this time.  The patient was educated that her  prescribed medications can have potential risk to a developing fetus. The patient is advised to contact this APRN/this office if she becomes pregnant or plans to become pregnant.  Pt verbalizes understanding and acknowledged agreement with this plan in her own words.        The following portions of the patient's history were reviewed and updated as appropriate: allergies, current medications, past family history, past medical history, past social history, past surgical history and problem list.          Past Medical History:  Past Medical History:   Diagnosis Date   • Anxiety    • Bipolar disorder (HCC)    • Depression    • Headache    • Obesity    • Panic disorder    • PTSD (post-traumatic stress disorder)    • Self-injurious behavior    • Sleep apnea    • Suicide attempt (HCC)     attempted overdose in 2017       Social History:  Social History     Socioeconomic History   • Marital status: Single   Tobacco Use   • Smoking status: Current Every Day Smoker     Packs/day: 0.50     Years: 8.00     Pack years: 4.00     Start date: 2014   • Smokeless tobacco: Never Used   Vaping Use   • Vaping Use: Never used   Substance and Sexual Activity   • Alcohol use: Not Currently   • Drug use: Not Currently     Types: Marijuana   • Sexual activity: Yes     Partners: Male     Birth control/protection: None       Family History:  Family History   Problem Relation Age of Onset   • Diabetes Mother    • Anxiety disorder Mother    • Depression Mother    • Anxiety disorder Father    • Depression Father    • Anxiety disorder Sister    • Depression Sister    • Depression Maternal Aunt    • Heart attack Paternal Aunt    • Anxiety disorder Paternal Aunt    • Depression Paternal Aunt    • No Known Problems Maternal Uncle    • No Known Problems Paternal Uncle    • Lung disease Maternal Grandfather    • Alzheimer's disease Maternal Grandmother    • Lung cancer Paternal Grandfather    • Alzheimer's disease Paternal Grandmother    • Alcohol  abuse Paternal Grandmother    • Anxiety disorder Sister    • Depression Sister    • Anxiety disorder Paternal Aunt    • Depression Paternal Aunt    • No Known Problems Maternal Uncle    • No Known Problems Paternal Uncle    • Heart attack Paternal Uncle    • Other Paternal Uncle        Past Surgical History:  Past Surgical History:   Procedure Laterality Date   • TONSILLECTOMY         Problem List:  Patient Active Problem List   Diagnosis   • Major depressive disorder, recurrent, moderate (HCC)   • Class 3 severe obesity without serious comorbidity with body mass index (BMI) of 50.0 to 59.9 in adult ([facility])   • Smoker   • Migraine without aura and without status migrainosus, not intractable   • Generalized anxiety disorder   • History of sleep apnea       Allergy:   No Known Allergies     Current Medications:   Current Outpatient Medications   Medication Sig Dispense Refill   • ARIPiprazole (Abilify) 15 MG tablet Take 0.5 tablets by mouth Daily. 15 tablet 0   • buPROPion XL (WELLBUTRIN XL) 150 MG 24 hr tablet Take 1 tablet by mouth Every Morning. 30 tablet 0   • buPROPion XL (WELLBUTRIN XL) 300 MG 24 hr tablet Take 1 tablet by mouth Every Morning. 30 tablet 0   • desvenlafaxine (PRISTIQ) 100 MG 24 hr tablet Take 1 tablet by mouth Daily. 30 tablet 0   • prazosin (MINIPRESS) 2 MG capsule Take 1 capsule by mouth Every Night. 30 capsule 0   • SUMAtriptan (Imitrex) 25 MG tablet Take one tablet at onset of headache. May repeat dose one time in 2 hours if headache not relieved. 20 tablet 0     No current facility-administered medications for this visit.       Review of Symptoms:    Review of Systems   Psychiatric/Behavioral: Positive for decreased concentration, sleep disturbance, depressed mood and stress. Negative for agitation, behavioral problems, dysphoric mood, hallucinations, self-injury, suicidal ideas and negative for hyperactivity. The patient is nervous/anxious.          Physical Exam:   There were no vitals taken  for this visit. There is no height or weight on file to calculate BMI.   Due to the remote nature of this encounter (virtual encounter), vitals were unable to be obtained.  Height stated at 69 inches.  Weight stated at 350-400, but most recently at around 400 pounds.      Physical Exam  Constitutional:       Appearance: Normal appearance.   Neurological:      Mental Status: She is alert and oriented to person, place, and time.   Psychiatric:         Attention and Perception: Attention normal.         Mood and Affect: Mood is depressed. Affect is blunt.         Speech: Speech normal.         Behavior: Behavior normal. Behavior is cooperative.         Thought Content: Thought content normal. Thought content is not paranoid or delusional. Thought content does not include homicidal or suicidal ideation. Thought content does not include homicidal or suicidal plan.         Cognition and Memory: Cognition and memory normal.         Judgment: Judgment normal.           Mental Status Exam:   Hygiene:   good  Cooperation:  Cooperative  Eye Contact:  Good  Psychomotor Behavior:  Appropriate  Affect:  Blunted  Mood: depressed  Hopelessness: Denies  Speech:  Normal  Thought Process:  Linear  Thought Content:  Mood congruent  Suicidal:  None  Homicidal:  None  Hallucinations:  None  Delusion:  None  Memory:  Intact  Orientation:  Person, Place, Time and Situation  Reliability:  good  Insight:  Good  Judgement:  Good  Impulse Control:  Good  Physical/Medical Issues:  No           Lab Results:   No visits with results within 1 Month(s) from this visit.   Latest known visit with results is:   Office Visit on 01/12/2022   Component Date Value Ref Range Status   • Glucose 01/12/2022 95  65 - 99 mg/dL Final   • BUN 01/12/2022 9  6 - 20 mg/dL Final   • Creatinine 01/12/2022 1.01 (A) 0.57 - 1.00 mg/dL Final   • Sodium 01/12/2022 142  136 - 145 mmol/L Final   • Potassium 01/12/2022 4.2  3.5 - 5.2 mmol/L Final   • Chloride 01/12/2022 106   98 - 107 mmol/L Final   • CO2 01/12/2022 27.3  22.0 - 29.0 mmol/L Final   • Calcium 01/12/2022 9.2  8.6 - 10.5 mg/dL Final   • Total Protein 01/12/2022 7.5  6.0 - 8.5 g/dL Final   • Albumin 01/12/2022 4.23  3.50 - 5.20 g/dL Final   • ALT (SGPT) 01/12/2022 32  1 - 33 U/L Final   • AST (SGOT) 01/12/2022 20  1 - 32 U/L Final   • Alkaline Phosphatase 01/12/2022 82  39 - 117 U/L Final   • Total Bilirubin 01/12/2022 0.3  0.0 - 1.2 mg/dL Final   • eGFR Non  Amer 01/12/2022 67  >60 mL/min/1.73 Final   • Globulin 01/12/2022 3.3  gm/dL Final   • A/G Ratio 01/12/2022 1.3  g/dL Final   • BUN/Creatinine Ratio 01/12/2022 8.9  7.0 - 25.0 Final   • Anion Gap 01/12/2022 8.7  5.0 - 15.0 mmol/L Final   • Total Cholesterol 01/12/2022 145  0 - 200 mg/dL Final   • Triglycerides 01/12/2022 100  0 - 150 mg/dL Final   • HDL Cholesterol 01/12/2022 33 (A) 40 - 60 mg/dL Final   • LDL Cholesterol  01/12/2022 93  0 - 100 mg/dL Final   • VLDL Cholesterol 01/12/2022 19  5 - 40 mg/dL Final   • LDL/HDL Ratio 01/12/2022 2.79   Final   • TSH 01/12/2022 2.790  0.270 - 4.200 uIU/mL Final   • Color 01/12/2022 Dark Yellow  Yellow, Straw, Dark Yellow, Kizzy Final   • Clarity, UA 01/12/2022 Clear  Clear Final   • Glucose, UA 01/12/2022 Negative  Negative, 1000 mg/dL (3+) mg/dL Final   • Bilirubin 01/12/2022 Negative  Negative Final   • Ketones, UA 01/12/2022 Negative  Negative Final   • Specific Gravity  01/12/2022 1.030  1.005 - 1.030 Final   • Blood, UA 01/12/2022 3+ (A) Negative Final   • pH, Urine 01/12/2022 6.0  5.0 - 8.0 Final   • Protein, POC 01/12/2022 Trace (A) Negative mg/dL Final   • Urobilinogen, UA 01/12/2022 Normal  Normal Final   • Leukocytes 01/12/2022 Negative  Negative Final   • Nitrite, UA 01/12/2022 Positive (A) Negative Final   • WBC 01/12/2022 9.57  3.40 - 10.80 10*3/mm3 Final   • RBC 01/12/2022 5.31 (A) 3.77 - 5.28 10*6/mm3 Final   • Hemoglobin 01/12/2022 12.5  12.0 - 15.9 g/dL Final   • Hematocrit 01/12/2022 42.0  34.0 - 46.6  % Final   • MCV 01/12/2022 79.1  79.0 - 97.0 fL Final   • MCH 01/12/2022 23.5 (A) 26.6 - 33.0 pg Final   • MCHC 01/12/2022 29.8 (A) 31.5 - 35.7 g/dL Final   • RDW 01/12/2022 16.5 (A) 12.3 - 15.4 % Final   • RDW-SD 01/12/2022 46.5  37.0 - 54.0 fl Final   • MPV 01/12/2022 10.8  6.0 - 12.0 fL Final   • Platelets 01/12/2022 347  140 - 450 10*3/mm3 Final   • Neutrophil % 01/12/2022 58.5  42.7 - 76.0 % Final   • Lymphocyte % 01/12/2022 31.3  19.6 - 45.3 % Final   • Monocyte % 01/12/2022 6.4  5.0 - 12.0 % Final   • Eosinophil % 01/12/2022 3.4  0.3 - 6.2 % Final   • Basophil % 01/12/2022 0.2  0.0 - 1.5 % Final   • Immature Grans % 01/12/2022 0.2  0.0 - 0.5 % Final   • Neutrophils, Absolute 01/12/2022 5.59  1.70 - 7.00 10*3/mm3 Final   • Lymphocytes, Absolute 01/12/2022 3.00  0.70 - 3.10 10*3/mm3 Final   • Monocytes, Absolute 01/12/2022 0.61  0.10 - 0.90 10*3/mm3 Final   • Eosinophils, Absolute 01/12/2022 0.33  0.00 - 0.40 10*3/mm3 Final   • Basophils, Absolute 01/12/2022 0.02  0.00 - 0.20 10*3/mm3 Final   • Immature Grans, Absolute 01/12/2022 0.02  0.00 - 0.05 10*3/mm3 Final   • nRBC 01/12/2022 0.0  0.0 - 0.2 /100 WBC Final         Assessment & Plan   Problems Addressed this Visit    None     Visit Diagnoses     Major depressive disorder, recurrent episode, moderate (HCC)  (Chronic)   -  Primary    Relevant Medications    desvenlafaxine (PRISTIQ) 100 MG 24 hr tablet    buPROPion XL (WELLBUTRIN XL) 300 MG 24 hr tablet    buPROPion XL (WELLBUTRIN XL) 150 MG 24 hr tablet    ARIPiprazole (Abilify) 15 MG tablet    Other Relevant Orders    Ambulatory Referral to Behavioral Health    Anxiety disorder, unspecified type  (Chronic)       Relevant Medications    desvenlafaxine (PRISTIQ) 100 MG 24 hr tablet    buPROPion XL (WELLBUTRIN XL) 300 MG 24 hr tablet    buPROPion XL (WELLBUTRIN XL) 150 MG 24 hr tablet    ARIPiprazole (Abilify) 15 MG tablet    Other Relevant Orders    Ambulatory Referral to Behavioral Health    Post traumatic  stress disorder (PTSD)  (Chronic)       Relevant Medications    desvenlafaxine (PRISTIQ) 100 MG 24 hr tablet    buPROPion XL (WELLBUTRIN XL) 300 MG 24 hr tablet    buPROPion XL (WELLBUTRIN XL) 150 MG 24 hr tablet    ARIPiprazole (Abilify) 15 MG tablet    Other Relevant Orders    Ambulatory Referral to Behavioral Health    Nightmares        Relevant Medications    prazosin (MINIPRESS) 2 MG capsule    desvenlafaxine (PRISTIQ) 100 MG 24 hr tablet    buPROPion XL (WELLBUTRIN XL) 300 MG 24 hr tablet    buPROPion XL (WELLBUTRIN XL) 150 MG 24 hr tablet    ARIPiprazole (Abilify) 15 MG tablet      Diagnoses       Codes Comments    Major depressive disorder, recurrent episode, moderate (HCC)    -  Primary ICD-10-CM: F33.1  ICD-9-CM: 296.32     Anxiety disorder, unspecified type     ICD-10-CM: F41.9  ICD-9-CM: 300.00     Post traumatic stress disorder (PTSD)     ICD-10-CM: F43.10  ICD-9-CM: 309.81     Nightmares     ICD-10-CM: F51.5  ICD-9-CM: 307.47           Visit Diagnoses:    ICD-10-CM ICD-9-CM   1. Major depressive disorder, recurrent episode, moderate (HCC)  F33.1 296.32   2. Anxiety disorder, unspecified type  F41.9 300.00   3. Post traumatic stress disorder (PTSD)  F43.10 309.81   4. Nightmares  F51.5 307.47          GOALS:  Short Term Goals: Patient will be compliant with medication, and patient will have no significant medication related side effects.  Patient will be engaged in psychotherapy as indicated.  Patient will report subjective improvement of symptoms.  Long term goals: To stabilize mood and treat/improve subjective symptoms, the patient will stay out of the hospital, the patient will be at an optimal level of functioning, and the patient will take all medications as prescribed.  The patient verbalized understanding and agreement with goals that were mutually set.      TREATMENT PLAN: Restart supportive psychotherapy efforts and TAKE  medications as indicated.  The patient declines restarting psychotherapy  at today's encounter.  Medication and treatment options, both pharmacological and non-pharmacological treatment options, discussed during today's visit, including any off label use of medication. Patient acknowledged and verbally consented with current treatment plan and was educated on the importance of compliance with treatment and follow-up appointments.      - Continue Abilify 7.5 mg by mouth once daily for mood.  - Continue Wellbutrin  mg by mouth once daily for mood.  - Increase prazosin to 2 mg by mouth once daily at bedtime for PTSD nightmares.  - Continue Pristiq 100 mg by mouth once daily for mood.  - Referral to begin psychotherapy placed at today's encounter per patient's verbal request.      MEDICATION ISSUES:  Discussed medication options and treatment plan of prescribed medication, any off label use of medication, as well as the risks, benefits, any black box warnings including increased suicidality, and side effects including but not limited to potential falls, dizziness, possible impaired driving, GI side effects (change in appetite, abdominal discomfort, nausea, vomiting, diarrhea, and/or constipation), dry mouth, somnolence, sedation, insomnia, activation, agitation, irritation, tremors, abnormal muscle movements or disorders, tardive dyskinesia, akathisia, asthenia, headache, sweating, possible bruising or rare bleeding, electrolyte and/or fluid abnormalities, change in blood pressure/heart rate/and or heart rhythm, hypotension, sexual dysfunction, rare impulse control problems, rare seizures, rare neuroleptic malignant syndrome, increased risk of death and cerebrovascular events, change in blood glucose and increased risk for diabetes, change in triglycerides and cholesterol and increased risk for dyslipidemia,  weight gain, weight gain that can become problematic to health, skin conditions and reactions, and metabolic adversities among others. Patient and/or guardian are agreeable to call  the office with any worsening of symptoms or onset of side effects, or if any concerns or questions arise.  The contact information for the office is made available to the patient and/or guardian. Patient and/or guardian are agreeable to call 911 or go to the nearest ER should they begin having any SI/HI, or if any urgent concerns arise.      SUICIDE RISK ASSESSMENT AND SAFETY PLAN: Unalterable demographics and a history of mental health intervention indicate this patient is in a high risk category compared to the general population. At present, the patient denies active SI/HI, intentions, or plans at this time and agrees to seek immediate care should such thoughts develop. The patient verbalizes understanding of how to access emergency care if needed and agrees to do so. Consideration of suicide risk and protective factors such as history, current presentation, individual strengths and weaknesses, psychosocial and environmental stressors and variables, psychiatric illness and symptoms, medical conditions and pain, took place in this interview. Based on those considerations, the patient is determined: within individual baseline and presenting no imminent risk for suicide or homicide. Other recommendations: The patient does not meet the criteria for inpatient admission and is not a safety risk to self or others at today's visit. Inpatient treatment offers no significant advantages over outpatient treatment for this patient at today's visit.  The patient was given ample time for questions and fully participated in treatment planning.  The patient was encouraged to call the clinic with any questions or concerns.  The patient was informed of access to emergency care. If patient were to develop any significant symptomatology, suicidal ideation, homicidal ideation, any concerns, or feel unsafe at any time they are to call the clinic and if unable to get immediate assistance should immediately call 911 or go to the nearest  emergency room.  Patient contracted verbally for the following: If you are experiencing an emotional crisis or have thoughts of harming yourself or others, please go to your nearest local emergency room or call 911. Will continue to re-assess medication response and side effects frequently to establish efficacy and ensure safety. Risks, any black box warnings, side effects, off label usage, and benefits of medication and treatment discussed with patient, along with potential adverse side effects of current and/or newly prescribed medication, alternative treatment options, and OTC medications.  Patient verbalized understanding of potential risks, any off label use of medication, any black box warnings, and any side effects in their own words. The patient verbalized understanding and agreed to comply with the safety plan discussed in their own words.  Patient given the number to the office. Number also discussed of the 24- hour suicide hotline.         MEDS ORDERED DURING VISIT:  New Medications Ordered This Visit   Medications   • prazosin (MINIPRESS) 2 MG capsule     Sig: Take 1 capsule by mouth Every Night.     Dispense:  30 capsule     Refill:  0   • desvenlafaxine (PRISTIQ) 100 MG 24 hr tablet     Sig: Take 1 tablet by mouth Daily.     Dispense:  30 tablet     Refill:  0   • buPROPion XL (WELLBUTRIN XL) 300 MG 24 hr tablet     Sig: Take 1 tablet by mouth Every Morning.     Dispense:  30 tablet     Refill:  0   • buPROPion XL (WELLBUTRIN XL) 150 MG 24 hr tablet     Sig: Take 1 tablet by mouth Every Morning.     Dispense:  30 tablet     Refill:  0   • ARIPiprazole (Abilify) 15 MG tablet     Sig: Take 0.5 tablets by mouth Daily.     Dispense:  15 tablet     Refill:  0       Return in about 4 weeks (around 7/5/2022), or if symptoms worsen or fail to improve, for Next scheduled follow up and Recheck.         Functional Status: Moderate impairment     Prognosis: Fair with Ongoing Treatment             This document has  been electronically signed by ELISE Dye  June 7, 2022 10:56 EDT    Some of the data in this electronic note has been brought forward from a previous encounter, any necessary changes have been made, it has been reviewed by this APRN, and it is accurate.    Please note that portions of this note were completed with a voice recognition program.

## 2022-07-12 ENCOUNTER — TELEMEDICINE (OUTPATIENT)
Dept: PSYCHIATRY | Facility: CLINIC | Age: 27
End: 2022-07-12

## 2022-07-12 ENCOUNTER — PRIOR AUTHORIZATION (OUTPATIENT)
Dept: PSYCHIATRY | Facility: CLINIC | Age: 27
End: 2022-07-12

## 2022-07-12 DIAGNOSIS — F41.9 ANXIETY DISORDER, UNSPECIFIED TYPE: Chronic | ICD-10-CM

## 2022-07-12 DIAGNOSIS — Z86.59 HISTORY OF BORDERLINE PERSONALITY DISORDER: ICD-10-CM

## 2022-07-12 DIAGNOSIS — F33.1 MAJOR DEPRESSIVE DISORDER, RECURRENT EPISODE, MODERATE: Primary | Chronic | ICD-10-CM

## 2022-07-12 DIAGNOSIS — F43.10 POST TRAUMATIC STRESS DISORDER (PTSD): Chronic | ICD-10-CM

## 2022-07-12 DIAGNOSIS — F51.5 NIGHTMARES: ICD-10-CM

## 2022-07-12 PROCEDURE — 99214 OFFICE O/P EST MOD 30 MIN: CPT | Performed by: NURSE PRACTITIONER

## 2022-07-12 RX ORDER — PRAZOSIN HYDROCHLORIDE 2 MG/1
2 CAPSULE ORAL NIGHTLY
Qty: 30 CAPSULE | Refills: 0 | Status: SHIPPED | OUTPATIENT
Start: 2022-07-12 | End: 2022-08-01 | Stop reason: SDUPTHER

## 2022-07-12 RX ORDER — VORTIOXETINE 5 MG/1
5 TABLET, FILM COATED ORAL
Qty: 30 TABLET | Refills: 0 | Status: SHIPPED | OUTPATIENT
Start: 2022-07-12 | End: 2022-08-01 | Stop reason: DRUGHIGH

## 2022-07-12 RX ORDER — DESVENLAFAXINE 25 MG/1
TABLET, EXTENDED RELEASE ORAL
Qty: 9 TABLET | Refills: 0 | Status: SHIPPED | OUTPATIENT
Start: 2022-07-12 | End: 2022-07-29

## 2022-07-12 RX ORDER — BUPROPION HYDROCHLORIDE 150 MG/1
150 TABLET ORAL EVERY MORNING
Qty: 30 TABLET | Refills: 0 | Status: SHIPPED | OUTPATIENT
Start: 2022-07-12 | End: 2022-08-01 | Stop reason: SDUPTHER

## 2022-07-12 RX ORDER — ARIPIPRAZOLE 15 MG/1
7.5 TABLET ORAL DAILY
Qty: 15 TABLET | Refills: 0 | Status: SHIPPED | OUTPATIENT
Start: 2022-07-12 | End: 2022-08-01 | Stop reason: SDUPTHER

## 2022-07-12 RX ORDER — BUPROPION HYDROCHLORIDE 300 MG/1
300 TABLET ORAL EVERY MORNING
Qty: 30 TABLET | Refills: 0 | Status: SHIPPED | OUTPATIENT
Start: 2022-07-12 | End: 2022-08-01 | Stop reason: SDUPTHER

## 2022-07-12 NOTE — PROGRESS NOTES
"This provider is located at the [facility] (through [facility]), [address], [facility], [address] using a secure [employer] Video Visit through [employer]. Patient is being seen remotely via telehealth at their home address in [place], and stated they are in a secure environment for this session. The patient's condition being diagnosed/treated is appropriate for telemedicine. The provider identified herself as well as her credentials.   The patient, and/or patients guardian, consent to be seen remotely, and when consent is given they understand that the consent allows for patient identifiable information to be sent to a third party as needed.   They may refuse to be seen remotely at any time. The electronic data is encrypted and password protected, and the patient and/or guardian has been advised of the potential risks to privacy not withstanding such measures.    You have chosen to receive care through a telehealth visit.  Do you consent to use a video/audio connection for your medical care today? Yes        Subjective   Kamryn Gross is a 27 y.o. female who presents today for follow up    Chief Complaint: Depression, anxiety, history of PTSD, and history of nightmares follow-up    Accompanied by: The patient reports she is alone at today's encounter    History of Present Illness:   The patient describes her mood as more depressed over the last few weeks.  The patient states she feels her anxiety is \"okay\", but she feels more depressed recently.  The patient states she feels depressed \"about myself and stuff\".  The patient rates her symptoms of depression at an 8/10 on a 0-10 scale, with 10 being the worst.  The patient reports her symptoms of depression as sleeping all the time, crying easily, and not wanting to be around other people.  The patient rates her symptoms of anxiety at a 5/10 on a 0-10 scale, with 10 being the worst.  The patient reports her symptoms of anxiety as " "biting at her nails and gums a lot, and excessive worry.  She states she \"gets tore up over nothing\".  The patient reports her appetite as good.  The patient states she feels like she is overeating right now because of her sadness and depressive symptoms.  The patient reports her sleep as too good.  The patient states she averages 10-13 hours of sleep each night, as well as naps 2-4 hours in the daytime.  The patient denies any nightmares. The patient denies any new medical problems or changes in medications since last appointment with this facility.  The patient reports she does have a follow-up appointment with her primary care provider at the end of this month.  The patient reports compliance with her current medication regimen.  The patient denies any side effects from her current medication regimen.  The patient denies any abnormal muscle movements or tics.   The patient would like to adjust her medication at this visit to help with her worsened depressive symptoms.  The patient denies any suicidal or homicidal ideations, plans, or intent at today's encounter and is convincing.  The patient denies any auditory hallucinations or visual hallucinations.  The patient does not endorse any significant symptoms consistent with matt or psychosis at today's encounter.  The patient does verbally contract for safety today's encounter.        Prior Psychiatric Medications:  Prozac  Zoloft  Trileptal  Paxil  Hydroxyzine  Prazosin  Desvenlafaxine/Pristiq  Wellbutrin XL  Abilify        Last Menstrual Period:  1 week ago.  The patient was educated that her prescribed medications can have potential risk to a developing fetus. The patient is advised to contact this APRN/this office if she becomes pregnant or plans to become pregnant.  Pt verbalizes understanding and acknowledged agreement with this plan in her own words.        The following portions of the patient's history were reviewed and updated as appropriate: allergies, " current medications, past family history, past medical history, past social history, past surgical history and problem list.          Past Medical History:  Past Medical History:   Diagnosis Date   • Anxiety    • Bipolar disorder (HCC)    • Depression    • Headache    • Obesity    • Panic disorder    • PTSD (post-traumatic stress disorder)    • Self-injurious behavior    • Sleep apnea    • Suicide attempt (HCC)     attempted overdose in 2017       Social History:  Social History     Socioeconomic History   • Marital status: Single   Tobacco Use   • Smoking status: Current Every Day Smoker     Packs/day: 0.50     Years: 8.00     Pack years: 4.00     Start date: 2014   • Smokeless tobacco: Never Used   Vaping Use   • Vaping Use: Never used   Substance and Sexual Activity   • Alcohol use: Not Currently   • Drug use: Not Currently     Types: Marijuana   • Sexual activity: Yes     Partners: Male     Birth control/protection: None       Family History:  Family History   Problem Relation Age of Onset   • Diabetes Mother    • Anxiety disorder Mother    • Depression Mother    • Anxiety disorder Father    • Depression Father    • Anxiety disorder Sister    • Depression Sister    • Depression Maternal Aunt    • Heart attack Paternal Aunt    • Anxiety disorder Paternal Aunt    • Depression Paternal Aunt    • No Known Problems Maternal Uncle    • No Known Problems Paternal Uncle    • Lung disease Maternal Grandfather    • Alzheimer's disease Maternal Grandmother    • Lung cancer Paternal Grandfather    • Alzheimer's disease Paternal Grandmother    • Alcohol abuse Paternal Grandmother    • Anxiety disorder Sister    • Depression Sister    • Anxiety disorder Paternal Aunt    • Depression Paternal Aunt    • No Known Problems Maternal Uncle    • No Known Problems Paternal Uncle    • Heart attack Paternal Uncle    • Other Paternal Uncle        Past Surgical History:  Past Surgical History:   Procedure Laterality Date   •  TONSILLECTOMY         Problem List:  Patient Active Problem List   Diagnosis   • Major depressive disorder, recurrent, moderate (HCC)   • Class 3 severe obesity without serious comorbidity with body mass index (BMI) of 50.0 to 59.9 in adult ([facility])   • Smoker   • Migraine without aura and without status migrainosus, not intractable   • Generalized anxiety disorder   • History of sleep apnea       Allergy:   No Known Allergies     Current Medications:   Current Outpatient Medications   Medication Sig Dispense Refill   • ARIPiprazole (Abilify) 15 MG tablet Take 0.5 tablets by mouth Daily. 15 tablet 0   • buPROPion XL (WELLBUTRIN XL) 150 MG 24 hr tablet Take 1 tablet by mouth Every Morning. 30 tablet 0   • buPROPion XL (WELLBUTRIN XL) 300 MG 24 hr tablet Take 1 tablet by mouth Every Morning. 30 tablet 0   • prazosin (MINIPRESS) 2 MG capsule Take 1 capsule by mouth Every Night. 30 capsule 0   • Desvenlafaxine Succinate ER (Pristiq) 25 MG tablet sustained-release 24 hour Take 2 tablets by mouth Daily for 3 days, THEN 1 tablet Daily for 3 days. Then completely stop taking. 9 tablet 0   • SUMAtriptan (Imitrex) 25 MG tablet Take one tablet at onset of headache. May repeat dose one time in 2 hours if headache not relieved. 20 tablet 0   • Vortioxetine HBr (Trintellix) 5 MG tablet Take 5 mg by mouth Daily With Breakfast. Do not start until you have completely stopped taking the Pristiq. 30 tablet 0     No current facility-administered medications for this visit.       Review of Symptoms:    Review of Systems   Constitutional: Positive for fatigue.   Psychiatric/Behavioral: Positive for decreased concentration, depressed mood and stress. Negative for agitation, dysphoric mood, hallucinations, self-injury, suicidal ideas and negative for hyperactivity. The patient is nervous/anxious.          Physical Exam:   There were no vitals taken for this visit. There is no height or weight on file to calculate BMI.   Due to the remote  nature of this encounter (virtual encounter), vitals were unable to be obtained.  Height stated at 69 inches.  Weight stated at around 400 pounds.      Physical Exam  Constitutional:       Appearance: Normal appearance.   Neurological:      Mental Status: She is alert and oriented to person, place, and time.   Psychiatric:         Attention and Perception: Attention normal.         Mood and Affect: Mood is depressed. Affect is blunt.         Speech: Speech normal.         Behavior: Behavior normal. Behavior is cooperative.         Thought Content: Thought content normal. Thought content is not paranoid or delusional. Thought content does not include homicidal or suicidal ideation. Thought content does not include homicidal or suicidal plan.         Cognition and Memory: Cognition and memory normal.         Judgment: Judgment normal.           Mental Status Exam:   Hygiene:   good  Cooperation:  Cooperative  Eye Contact:  Good  Psychomotor Behavior:  Appropriate  Affect:  Blunted  Mood: depressed  Hopelessness: Denies  Speech:  Normal  Thought Process:  Linear  Thought Content:  Mood congruent  Suicidal:  None  Homicidal:  None  Hallucinations:  None  Delusion:  None  Memory:  Intact  Orientation:  Person, Place, Time and Situation  Reliability:  good  Insight:  Good  Judgement:  Good  Impulse Control:  Good  Physical/Medical Issues:  No           Lab Results:   No visits with results within 1 Month(s) from this visit.   Latest known visit with results is:   Office Visit on 01/12/2022   Component Date Value Ref Range Status   • Glucose 01/12/2022 95  65 - 99 mg/dL Final   • BUN 01/12/2022 9  6 - 20 mg/dL Final   • Creatinine 01/12/2022 1.01 (A) 0.57 - 1.00 mg/dL Final   • Sodium 01/12/2022 142  136 - 145 mmol/L Final   • Potassium 01/12/2022 4.2  3.5 - 5.2 mmol/L Final   • Chloride 01/12/2022 106  98 - 107 mmol/L Final   • CO2 01/12/2022 27.3  22.0 - 29.0 mmol/L Final   • Calcium 01/12/2022 9.2  8.6 - 10.5 mg/dL  Final   • Total Protein 01/12/2022 7.5  6.0 - 8.5 g/dL Final   • Albumin 01/12/2022 4.23  3.50 - 5.20 g/dL Final   • ALT (SGPT) 01/12/2022 32  1 - 33 U/L Final   • AST (SGOT) 01/12/2022 20  1 - 32 U/L Final   • Alkaline Phosphatase 01/12/2022 82  39 - 117 U/L Final   • Total Bilirubin 01/12/2022 0.3  0.0 - 1.2 mg/dL Final   • eGFR Non  Amer 01/12/2022 67  >60 mL/min/1.73 Final   • Globulin 01/12/2022 3.3  gm/dL Final   • A/G Ratio 01/12/2022 1.3  g/dL Final   • BUN/Creatinine Ratio 01/12/2022 8.9  7.0 - 25.0 Final   • Anion Gap 01/12/2022 8.7  5.0 - 15.0 mmol/L Final   • Total Cholesterol 01/12/2022 145  0 - 200 mg/dL Final   • Triglycerides 01/12/2022 100  0 - 150 mg/dL Final   • HDL Cholesterol 01/12/2022 33 (A) 40 - 60 mg/dL Final   • LDL Cholesterol  01/12/2022 93  0 - 100 mg/dL Final   • VLDL Cholesterol 01/12/2022 19  5 - 40 mg/dL Final   • LDL/HDL Ratio 01/12/2022 2.79   Final   • TSH 01/12/2022 2.790  0.270 - 4.200 uIU/mL Final   • Color 01/12/2022 Dark Yellow  Yellow, Straw, Dark Yellow, Kizzy Final   • Clarity, UA 01/12/2022 Clear  Clear Final   • Glucose, UA 01/12/2022 Negative  Negative, 1000 mg/dL (3+) mg/dL Final   • Bilirubin 01/12/2022 Negative  Negative Final   • Ketones, UA 01/12/2022 Negative  Negative Final   • Specific Gravity  01/12/2022 1.030  1.005 - 1.030 Final   • Blood, UA 01/12/2022 3+ (A) Negative Final   • pH, Urine 01/12/2022 6.0  5.0 - 8.0 Final   • Protein, POC 01/12/2022 Trace (A) Negative mg/dL Final   • Urobilinogen, UA 01/12/2022 Normal  Normal Final   • Leukocytes 01/12/2022 Negative  Negative Final   • Nitrite, UA 01/12/2022 Positive (A) Negative Final   • WBC 01/12/2022 9.57  3.40 - 10.80 10*3/mm3 Final   • RBC 01/12/2022 5.31 (A) 3.77 - 5.28 10*6/mm3 Final   • Hemoglobin 01/12/2022 12.5  12.0 - 15.9 g/dL Final   • Hematocrit 01/12/2022 42.0  34.0 - 46.6 % Final   • MCV 01/12/2022 79.1  79.0 - 97.0 fL Final   • MCH 01/12/2022 23.5 (A) 26.6 - 33.0 pg Final   • MCHC  01/12/2022 29.8 (A) 31.5 - 35.7 g/dL Final   • RDW 01/12/2022 16.5 (A) 12.3 - 15.4 % Final   • RDW-SD 01/12/2022 46.5  37.0 - 54.0 fl Final   • MPV 01/12/2022 10.8  6.0 - 12.0 fL Final   • Platelets 01/12/2022 347  140 - 450 10*3/mm3 Final   • Neutrophil % 01/12/2022 58.5  42.7 - 76.0 % Final   • Lymphocyte % 01/12/2022 31.3  19.6 - 45.3 % Final   • Monocyte % 01/12/2022 6.4  5.0 - 12.0 % Final   • Eosinophil % 01/12/2022 3.4  0.3 - 6.2 % Final   • Basophil % 01/12/2022 0.2  0.0 - 1.5 % Final   • Immature Grans % 01/12/2022 0.2  0.0 - 0.5 % Final   • Neutrophils, Absolute 01/12/2022 5.59  1.70 - 7.00 10*3/mm3 Final   • Lymphocytes, Absolute 01/12/2022 3.00  0.70 - 3.10 10*3/mm3 Final   • Monocytes, Absolute 01/12/2022 0.61  0.10 - 0.90 10*3/mm3 Final   • Eosinophils, Absolute 01/12/2022 0.33  0.00 - 0.40 10*3/mm3 Final   • Basophils, Absolute 01/12/2022 0.02  0.00 - 0.20 10*3/mm3 Final   • Immature Grans, Absolute 01/12/2022 0.02  0.00 - 0.05 10*3/mm3 Final   • nRBC 01/12/2022 0.0  0.0 - 0.2 /100 WBC Final         Assessment & Plan   Problems Addressed this Visit    None     Visit Diagnoses     Major depressive disorder, recurrent episode, moderate (HCC)  (Chronic)   -  Primary    Relevant Medications    ARIPiprazole (Abilify) 15 MG tablet    buPROPion XL (WELLBUTRIN XL) 150 MG 24 hr tablet    buPROPion XL (WELLBUTRIN XL) 300 MG 24 hr tablet    Desvenlafaxine Succinate ER (Pristiq) 25 MG tablet sustained-release 24 hour    Vortioxetine HBr (Trintellix) 5 MG tablet    Anxiety disorder, unspecified type  (Chronic)       Relevant Medications    ARIPiprazole (Abilify) 15 MG tablet    buPROPion XL (WELLBUTRIN XL) 150 MG 24 hr tablet    buPROPion XL (WELLBUTRIN XL) 300 MG 24 hr tablet    Desvenlafaxine Succinate ER (Pristiq) 25 MG tablet sustained-release 24 hour    Vortioxetine HBr (Trintellix) 5 MG tablet    Post traumatic stress disorder (PTSD)  (Chronic)       Relevant Medications    ARIPiprazole (Abilify) 15 MG  tablet    buPROPion XL (WELLBUTRIN XL) 150 MG 24 hr tablet    buPROPion XL (WELLBUTRIN XL) 300 MG 24 hr tablet    Desvenlafaxine Succinate ER (Pristiq) 25 MG tablet sustained-release 24 hour    Vortioxetine HBr (Trintellix) 5 MG tablet    History of borderline personality disorder        Nightmares        Relevant Medications    ARIPiprazole (Abilify) 15 MG tablet    buPROPion XL (WELLBUTRIN XL) 150 MG 24 hr tablet    buPROPion XL (WELLBUTRIN XL) 300 MG 24 hr tablet    prazosin (MINIPRESS) 2 MG capsule    Desvenlafaxine Succinate ER (Pristiq) 25 MG tablet sustained-release 24 hour    Vortioxetine HBr (Trintellix) 5 MG tablet      Diagnoses       Codes Comments    Major depressive disorder, recurrent episode, moderate (HCC)    -  Primary ICD-10-CM: F33.1  ICD-9-CM: 296.32     Anxiety disorder, unspecified type     ICD-10-CM: F41.9  ICD-9-CM: 300.00     Post traumatic stress disorder (PTSD)     ICD-10-CM: F43.10  ICD-9-CM: 309.81     History of borderline personality disorder     ICD-10-CM: Z86.59  ICD-9-CM: V11.8     Nightmares     ICD-10-CM: F51.5  ICD-9-CM: 307.47           Visit Diagnoses:    ICD-10-CM ICD-9-CM   1. Major depressive disorder, recurrent episode, moderate (HCC)  F33.1 296.32   2. Anxiety disorder, unspecified type  F41.9 300.00   3. Post traumatic stress disorder (PTSD)  F43.10 309.81   4. History of borderline personality disorder  Z86.59 V11.8   5. Nightmares  F51.5 307.47          GOALS:  Short Term Goals: Patient will be compliant with medication, and patient will have no significant medication related side effects.  Patient will be engaged in psychotherapy as indicated.  Patient will report subjective improvement of symptoms.  Long term goals: To stabilize mood and treat/improve subjective symptoms, the patient will stay out of the hospital, the patient will be at an optimal level of functioning, and the patient will take all medications as prescribed.  The patient verbalized understanding and  agreement with goals that were mutually set.      TREATMENT PLAN: Restart supportive psychotherapy efforts and TAKE  medications as indicated.  The patient declines restarting psychotherapy at today's encounter.  Medication and treatment options, both pharmacological and non-pharmacological treatment options, discussed during today's visit, including any off label use of medication. Patient acknowledged and verbally consented with current treatment plan and was educated on the importance of compliance with treatment and follow-up appointments.      - Continue Abilify 7.5 mg by mouth once daily for mood.  - Continue Wellbutrin  mg by mouth once daily for mood.  - Continue prazosin 2 mg by mouth once daily at bedtime for PTSD nightmares.  - Discontinue Pristiq by taking Pristiq at 50 mg by mouth once daily for 3 days, then take Pristiq 25 mg by mouth once daily for 3 days, then completely stop taking Pristiq.  - When the patient has completely stopped taking Pristiq she will take Trintellix 5 mg by mouth once daily for mood.      MEDICATION ISSUES:  Discussed medication options and treatment plan of prescribed medication, any off label use of medication, as well as the risks, benefits, any black box warnings including increased suicidality, and side effects including but not limited to potential falls, dizziness, possible impaired driving, GI side effects (change in appetite, abdominal discomfort, nausea, vomiting, diarrhea, and/or constipation), dry mouth, somnolence, sedation, insomnia, activation, agitation, irritation, tremors, abnormal muscle movements or disorders, tardive dyskinesia, akathisia, asthenia, headache, sweating, possible bruising or rare bleeding, electrolyte and/or fluid abnormalities, change in blood pressure/heart rate/and or heart rhythm, hypotension, sexual dysfunction, rare impulse control problems, rare seizures, rare neuroleptic malignant syndrome, increased risk of death and  cerebrovascular events, change in blood glucose and increased risk for diabetes, change in triglycerides and cholesterol and increased risk for dyslipidemia,  weight gain, weight gain that can become problematic to health, skin conditions and reactions, and metabolic adversities among others. Patient and/or guardian are agreeable to call the office with any worsening of symptoms or onset of side effects, or if any concerns or questions arise.  The contact information for the office is made available to the patient and/or guardian. Patient and/or guardian are agreeable to call 911 or go to the nearest ER should they begin having any SI/HI, or if any urgent concerns arise.      SUICIDE RISK ASSESSMENT AND SAFETY PLAN: Unalterable demographics and a history of mental health intervention indicate this patient is in a high risk category compared to the general population. At present, the patient denies active SI/HI, intentions, or plans at this time and agrees to seek immediate care should such thoughts develop. The patient verbalizes understanding of how to access emergency care if needed and agrees to do so. Consideration of suicide risk and protective factors such as history, current presentation, individual strengths and weaknesses, psychosocial and environmental stressors and variables, psychiatric illness and symptoms, medical conditions and pain, took place in this interview. Based on those considerations, the patient is determined: within individual baseline and presenting no imminent risk for suicide or homicide. Other recommendations: The patient does not meet the criteria for inpatient admission and is not a safety risk to self or others at today's visit. Inpatient treatment offers no significant advantages over outpatient treatment for this patient at today's visit.  The patient was given ample time for questions and fully participated in treatment planning.  The patient was encouraged to call the clinic with  any questions or concerns.  The patient was informed of access to emergency care. If patient were to develop any significant symptomatology, suicidal ideation, homicidal ideation, any concerns, or feel unsafe at any time they are to call the clinic and if unable to get immediate assistance should immediately call 911 or go to the nearest emergency room.  Patient contracted verbally for the following: If you are experiencing an emotional crisis or have thoughts of harming yourself or others, please go to your nearest local emergency room or call 911. Will continue to re-assess medication response and side effects frequently to establish efficacy and ensure safety. Risks, any black box warnings, side effects, off label usage, and benefits of medication and treatment discussed with patient, along with potential adverse side effects of current and/or newly prescribed medication, alternative treatment options, and OTC medications.  Patient verbalized understanding of potential risks, any off label use of medication, any black box warnings, and any side effects in their own words. The patient verbalized understanding and agreed to comply with the safety plan discussed in their own words.  Patient given the number to the office. Number also discussed of the 24- hour suicide hotline.         MEDS ORDERED DURING VISIT:  New Medications Ordered This Visit   Medications   • ARIPiprazole (Abilify) 15 MG tablet     Sig: Take 0.5 tablets by mouth Daily.     Dispense:  15 tablet     Refill:  0   • buPROPion XL (WELLBUTRIN XL) 150 MG 24 hr tablet     Sig: Take 1 tablet by mouth Every Morning.     Dispense:  30 tablet     Refill:  0   • buPROPion XL (WELLBUTRIN XL) 300 MG 24 hr tablet     Sig: Take 1 tablet by mouth Every Morning.     Dispense:  30 tablet     Refill:  0   • prazosin (MINIPRESS) 2 MG capsule     Sig: Take 1 capsule by mouth Every Night.     Dispense:  30 capsule     Refill:  0   • Desvenlafaxine Succinate ER  (Pristiq) 25 MG tablet sustained-release 24 hour     Sig: Take 2 tablets by mouth Daily for 3 days, THEN 1 tablet Daily for 3 days. Then completely stop taking.     Dispense:  9 tablet     Refill:  0   • Vortioxetine HBr (Trintellix) 5 MG tablet     Sig: Take 5 mg by mouth Daily With Breakfast. Do not start until you have completely stopped taking the Pristiq.     Dispense:  30 tablet     Refill:  0       Return in about 3 weeks (around 8/2/2022), or if symptoms worsen or fail to improve, for Next scheduled follow up and Recheck.         Functional Status: Moderate impairment     Prognosis: Fair with Ongoing Treatment             This document has been electronically signed by ELISE Dye  July 12, 2022 11:13 EDT    Some of the data in this electronic note has been brought forward from a previous encounter, any necessary changes have been made, it has been reviewed by this APRN, and it is accurate.    Please note that portions of this note were completed with a voice recognition program.

## 2022-07-29 ENCOUNTER — OFFICE VISIT (OUTPATIENT)
Dept: FAMILY MEDICINE CLINIC | Facility: CLINIC | Age: 27
End: 2022-07-29

## 2022-07-29 VITALS
DIASTOLIC BLOOD PRESSURE: 80 MMHG | SYSTOLIC BLOOD PRESSURE: 128 MMHG | OXYGEN SATURATION: 98 % | RESPIRATION RATE: 20 BRPM | WEIGHT: 293 LBS | BODY MASS INDEX: 41.95 KG/M2 | HEIGHT: 70 IN | HEART RATE: 95 BPM | TEMPERATURE: 97.1 F

## 2022-07-29 DIAGNOSIS — F33.1 MAJOR DEPRESSIVE DISORDER, RECURRENT, MODERATE: ICD-10-CM

## 2022-07-29 DIAGNOSIS — F41.1 GENERALIZED ANXIETY DISORDER: ICD-10-CM

## 2022-07-29 DIAGNOSIS — F43.10 PTSD (POST-TRAUMATIC STRESS DISORDER): ICD-10-CM

## 2022-07-29 DIAGNOSIS — E66.01 CLASS 3 SEVERE OBESITY WITHOUT SERIOUS COMORBIDITY WITH BODY MASS INDEX (BMI) OF 50.0 TO 59.9 IN ADULT, UNSPECIFIED OBESITY TYPE: ICD-10-CM

## 2022-07-29 DIAGNOSIS — G47.30 SLEEP APNEA, UNSPECIFIED TYPE: ICD-10-CM

## 2022-07-29 DIAGNOSIS — F17.200 SMOKER: ICD-10-CM

## 2022-07-29 DIAGNOSIS — G43.909 MIGRAINE WITHOUT STATUS MIGRAINOSUS, NOT INTRACTABLE, UNSPECIFIED MIGRAINE TYPE: Primary | ICD-10-CM

## 2022-07-29 PROCEDURE — 99213 OFFICE O/P EST LOW 20 MIN: CPT | Performed by: FAMILY MEDICINE

## 2022-07-29 RX ORDER — SUMATRIPTAN 25 MG/1
TABLET, FILM COATED ORAL
Qty: 20 TABLET | Refills: 5 | Status: SHIPPED | OUTPATIENT
Start: 2022-07-29

## 2022-07-29 RX ORDER — PRAZOSIN HYDROCHLORIDE 1 MG/1
1 CAPSULE ORAL
COMMUNITY
Start: 2022-06-09 | End: 2022-07-29

## 2022-07-29 NOTE — PATIENT INSTRUCTIONS
Smoking Tobacco Information, Adult  Smoking tobacco can be harmful to your health. Tobacco contains a poisonous (toxic), colorless chemical called nicotine. Nicotine is addictive. It changes the brain and can make it hard to stop smoking. Tobacco also has other toxic chemicals that can hurt your body and raise your risk of many cancers.  How can smoking tobacco affect me?  Smoking tobacco puts you at risk for:  Cancer. Smoking is most commonly associated with lung cancer, but can also lead to cancer in other parts of the body.  Chronic obstructive pulmonary disease (COPD). This is a long-term lung condition that makes it hard to breathe. It also gets worse over time.  High blood pressure (hypertension), heart disease, stroke, or heart attack.  Lung infections, such as pneumonia.  Cataracts. This is when the lenses in the eyes become clouded.  Digestive problems. This may include peptic ulcers, heartburn, and gastroesophageal reflux disease (GERD).  Oral health problems, such as gum disease and tooth loss.  Loss of taste and smell.  Smoking can affect your appearance by causing:  Wrinkles.  Yellow or stained teeth, fingers, and fingernails.  Smoking tobacco can also affect your social life, because:  It may be challenging to find places to smoke when away from home. Many workplaces, restaurants, hotels, and public places are tobacco-free.  Smoking is expensive. This is due to the cost of tobacco and the long-term costs of treating health problems from smoking.  Secondhand smoke may affect those around you. Secondhand smoke can cause lung cancer, breathing problems, and heart disease. Children of smokers have a higher risk for:  Sudden infant death syndrome (SIDS).  Ear infections.  Lung infections.  If you currently smoke tobacco, quitting now can help you:  Lead a longer and healthier life.  Look, smell, breathe, and feel better over time.  Save money.  Protect others from the harms of secondhand smoke.  What  actions can I take to prevent health problems?  Quit smoking    Do not start smoking. Quit if you already do.  Make a plan to quit smoking and commit to it. Look for programs to help you and ask your health care provider for recommendations and ideas.  Set a date and write down all the reasons you want to quit.  Let your friends and family know you are quitting so they can help and support you. Consider finding friends who also want to quit. It can be easier to quit with someone else, so that you can support each other.  Talk with your health care provider about using nicotine replacement medicines to help you quit, such as gum, lozenges, patches, sprays, or pills.  Do not replace cigarette smoking with electronic cigarettes, which are commonly called e-cigarettes. The safety of e-cigarettes is not known, and some may contain harmful chemicals.  If you try to quit but return to smoking, stay positive. It is common to slip up when you first quit, so take it one day at a time.  Be prepared for cravings. When you feel the urge to smoke, chew gum or suck on hard candy.    Lifestyle  Stay busy and take care of your body.  Drink enough fluid to keep your urine pale yellow.  Get plenty of exercise and eat a healthy diet. This can help prevent weight gain after quitting.  Monitor your eating habits. Quitting smoking can cause you to have a larger appetite than when you smoke.  Find ways to relax. Go out with friends or family to a movie or a restaurant where people do not smoke.  Ask your health care provider about having regular tests (screenings) to check for cancer. This may include blood tests, imaging tests, and other tests.  Find ways to manage your stress, such as meditation, yoga, or exercise.  Where to find support  To get support to quit smoking, consider:  Asking your health care provider for more information and resources.  Taking classes to learn more about quitting smoking.  Looking for local organizations  that offer resources about quitting smoking.  Joining a support group for people who want to quit smoking in your local community.  Calling the smokefree.gov counselor helpline: 1-800-Quit-Now (1-314.485.3897)  Where to find more information  You may find more information about quitting smoking from:  HelpGuide.org: www.helpguide.org  Smokefree.gov: smokefree.gov  American Lung Association: www.lung.org  Contact a health care provider if you:  Have problems breathing.  Notice that your lips, nose, or fingers turn blue.  Have chest pain.  Are coughing up blood.  Feel faint or you pass out.  Have other health changes that cause you to worry.  Summary  Smoking tobacco can negatively affect your health, the health of those around you, your finances, and your social life.  Do not start smoking. Quit if you already do. If you need help quitting, ask your health care provider.  Think about joining a support group for people who want to quit smoking in your local community. There are many effective programs that will help you to quit this behavior.  This information is not intended to replace advice given to you by your health care provider. Make sure you discuss any questions you have with your health care provider.  Document Revised: 09/11/2020 Document Reviewed: 01/02/2018  [employer] Patient Education © 2021 [employer]  Major Depressive Disorder, Adult  Major depressive disorder is a mental health condition. This disorder affects feelings. It can also affect the body. Symptoms of this condition last most of the day, almost every day, for 2 weeks. This disorder can affect:  Relationships.  Daily activities, such as work and school.  Activities that you normally like to do.  What are the causes?  The cause of this condition is not known. The disorder is likely caused by a mix of things, including:  Your personality, such as being a shy person.  Your behavior, or how you act toward others.  Your thoughts and  feelings.  Too much alcohol or drugs.  How you react to stress.  Health and mental problems that you have had for a long time.  Things that hurt you in the past (trauma).  Big changes in your life, such as divorce.  What increases the risk?  The following factors may make you more likely to develop this condition:  Having family members with depression.  Being a woman.  Problems in the family.  Low levels of some brain chemicals.  Things that caused you pain as a child, especially if you lost a parent or were abused.  A lot of stress in your life, such as from:  Living without basic needs of life, such as food and shelter.  Being treated poorly because of race, sex, or Sabianist (discrimination).  Health and mental problems that you have had for a long time.  What are the signs or symptoms?  The main symptoms of this condition are:  Being sad all the time.  Being grouchy all the time.  Loss of interest in things and activities.  Other symptoms include:  Sleeping too much or too little.  Eating too much or too little.  Gaining or losing weight, without knowing why.  Feeling tired or having low energy.  Being restless and weak.  Feeling hopeless, worthless, or guilty.  Trouble thinking clearly or making decisions.  Thoughts of hurting yourself or others, or thoughts of ending your life.  Spending a lot of time alone.  Inability to complete common tasks of daily life.  If you have very bad MDD, you may:  Believe things that are not true.  Hear, see, taste, or feel things that are not there.  Have mild depression that lasts for at least 2 years.  Feel very sad and hopeless.  Have trouble speaking or moving.  How is this treated?  This condition may be treated with:  Talk therapy. This teaches you to know bad thoughts, feelings, and actions and how to change them.  This can also help you to communicate with others.  This can be done with members of your family.  Medicines. These can be used to treat worry (anxiety),  depression, or low levels of chemicals in the brain.  Lifestyle changes. You may need to:  Limit alcohol use.  Limit drug use.  Get regular exercise.  Get plenty of sleep.  Make healthy eating choices.  Spend more time outdoors.  Brain stimulation. This treatment excites the brain. This is done when symptoms are very bad or have not gotten better with other treatments.  Follow these instructions at home:  Activity  Get regular exercise as told.  Spend time outdoors as told.  Make time to do the things you enjoy.  Find ways to deal with stress. Try to:  Meditate.  Do deep breathing.  Spend time in nature.  Keep a journal.  Return to your normal activities as told by your doctor. Ask your doctor what activities are safe for you.  Alcohol and drug use  If you drink alcohol:  Limit how much you use to:  0-1 drink a day for women.  0-2 drinks a day for men.  Be aware of how much alcohol is in your drink. In the [place] one drink equals one 12 oz bottle of beer (355 mL), one 5 oz glass of wine (148 mL), or one 1½ oz glass of hard liquor (44 mL).  Talk to your doctor about:  Alcohol use. Alcohol can affect some medicines.  Any drug use.  General instructions    Take over-the-counter and prescription medicines and herbal preparations only as told by your doctor.  Eat a healthy diet.  Get a lot of sleep.  Think about joining a support group. Your doctor may be able to suggest one.  Keep all follow-up visits as told by your doctor. This is important.    Where to find more information:  National [place] on Mental Illness: www.fernanda.org  [place] National [place] of Mental Health: www.nimh.nih.gov  American Psychiatric Association: www.psychiatry.org/patients-families/  Contact a doctor if:  Your symptoms get worse.  You get new symptoms.  Get help right away if:  You hurt yourself.  You have serious thoughts about hurting yourself or others.  You see, hear, taste, smell, or feel things that are not there.  If you ever feel like  "you may hurt yourself or others, or have thoughts about taking your own life, get help right away. Go to your nearest emergency department or:  Call your local emergency services (731 in the [place]  Call a suicide crisis helpline, such as the National Suicide Prevention Lifeline at 1-715.233.6444. This is open 24 hours a day in the [place]  Text the Crisis Text Line at 821155 (in the [place]  Summary  Major depressive disorder is a mental health condition. This disorder affects feelings. Symptoms of this condition last most of the day, almost every day, for 2 weeks.  The symptoms of this disorder can cause problems with relationships and with daily activities.  There are treatments and support for people who get this disorder. You may need more than one type of treatment.  Get help right away if you have serious thoughts about hurting yourself or others.  This information is not intended to replace advice given to you by your health care provider. Make sure you discuss any questions you have with your health care provider.  Document Revised: 11/28/2020 Document Reviewed: 11/28/2020  [employer] Patient Education © 2021 [employer]  [email or web address]">   Generalized Anxiety Disorder, Adult  Generalized anxiety disorder (WILLY) is a mental health condition. Unlike normal worries, anxiety related to WILLY is not triggered by a specific event. These worries do not fade or get better with time. WILLY interferes with relationships, work, and school.  WILLY symptoms can vary from mild to severe. People with severe WILLY can have intense waves of anxiety with physical symptoms that are similar to panic attacks.  What are the causes?  The exact cause of WILLY is not known, but the following are believed to have an impact:  Differences in natural brain chemicals.  Genes passed down from parents to children.  Differences in the way threats are perceived.  Development during childhood.  Personality.  What increases the risk?  The following " factors may make you more likely to develop this condition:  Being female.  Having a family history of anxiety disorders.  Being very shy.  Experiencing very stressful life events, such as the death of a loved one.  Having a very stressful family environment.  What are the signs or symptoms?  People with WILLY often worry excessively about many things in their lives, such as their health and family. Symptoms may also include:  Mental and emotional symptoms:  Worrying excessively about natural disasters.  Fear of being late.  Difficulty concentrating.  Fears that others are judging your performance.  Physical symptoms:  Fatigue.  Headaches, muscle tension, muscle twitches, trembling, or feeling shaky.  Feeling like your heart is pounding or beating very fast.  Feeling out of breath or like you cannot take a deep breath.  Having trouble falling asleep or staying asleep, or experiencing restlessness.  Sweating.  Nausea, diarrhea, or irritable bowel syndrome (IBS).  Behavioral symptoms:  Experiencing erratic moods or irritability.  Avoidance of new situations.  Avoidance of people.  Extreme difficulty making decisions.  How is this diagnosed?  This condition is diagnosed based on your symptoms and medical history. You will also have a physical exam. Your health care provider may perform tests to rule out other possible causes of your symptoms.  To be diagnosed with WILLY, a person must have anxiety that:  Is out of his or her control.  Affects several different aspects of his or her life, such as work and relationships.  Causes distress that makes him or her unable to take part in normal activities.  Includes at least three symptoms of WILLY, such as restlessness, fatigue, trouble concentrating, irritability, muscle tension, or sleep problems.  Before your health care provider can confirm a diagnosis of WILLY, these symptoms must be present more days than they are not, and they must last for 6 months or longer.  How is this  treated?  This condition may be treated with:  Medicine. Antidepressant medicine is usually prescribed for long-term daily control. Anti-anxiety medicines may be added in severe cases, especially when panic attacks occur.  Talk therapy (psychotherapy). Certain types of talk therapy can be helpful in treating WILLY by providing support, education, and guidance. Options include:  Cognitive behavioral therapy (CBT). People learn coping skills and self-calming techniques to ease their physical symptoms. They learn to identify unrealistic thoughts and behaviors and to replace them with more appropriate thoughts and behaviors.  Acceptance and commitment therapy (ACT). This treatment teaches people how to be mindful as a way to cope with unwanted thoughts and feelings.  Biofeedback. This process trains you to manage your body's response (physiological response) through breathing techniques and relaxation methods. You will work with a therapist while machines are used to monitor your physical symptoms.  Stress management techniques. These include yoga, meditation, and exercise.  A mental health specialist can help determine which treatment is best for you. Some people see improvement with one type of therapy. However, other people require a combination of therapies.  Follow these instructions at home:  Lifestyle  Maintain a consistent routine and schedule.  Anticipate stressful situations. Create a plan, and allow extra time to work with your plan.  Practice stress management or self-calming techniques that you have learned from your therapist or your health care provider.  General instructions  Take over-the-counter and prescription medicines only as told by your health care provider.  Understand that you are likely to have setbacks. Accept this and be kind to yourself as you persist to take better care of yourself.  Recognize and accept your accomplishments, even if you  them as small.  Keep all follow-up visits as  told by your health care provider. This is important.  Contact a health care provider if:  Your symptoms do not get better.  Your symptoms get worse.  You have signs of depression, such as:  A persistently sad or irritable mood.  Loss of enjoyment in activities that used to bring you narinder.  Change in weight or eating.  Changes in sleeping habits.  Avoiding friends or family members.  Loss of energy for normal tasks.  Feelings of guilt or worthlessness.  Get help right away if:  You have serious thoughts about hurting yourself or others.  If you ever feel like you may hurt yourself or others, or have thoughts about taking your own life, get help right away. Go to your nearest emergency department or:  Call your local emergency services (117 in the [place]  Call a suicide crisis helpline, such as the National Suicide Prevention Lifeline at 1-628.159.1911. This is open 24 hours a day in the [place]  Text the Crisis Text Line at 329836 (in the [place]  Summary  Generalized anxiety disorder (WILLY) is a mental health condition that involves worry that is not triggered by a specific event.  People with WILLY often worry excessively about many things in their lives, such as their health and family.  WILLY may cause symptoms such as restlessness, trouble concentrating, sleep problems, frequent sweating, nausea, diarrhea, headaches, and trembling or muscle twitching.  A mental health specialist can help determine which treatment is best for you. Some people see improvement with one type of therapy. However, other people require a combination of therapies.  This information is not intended to replace advice given to you by your health care provider. Make sure you discuss any questions you have with your health care provider.  Document Revised: 10/07/2020 Document Reviewed: 10/07/2020  Elsevier Patient Education © 2021 [employer].  Obesity, Adult  Obesity is the condition of having too much total body fat. Being overweight or obese  means that your weight is greater than what is considered healthy for your body size. Obesity is determined by a measurement called BMI. BMI is an estimate of body fat and is calculated from height and weight. For adults, a BMI of 30 or higher is considered obese.  Obesity can lead to other health concerns and major illnesses, including:  Stroke.  Coronary artery disease (CAD).  Type 2 diabetes.  Some types of cancer, including cancers of the colon, breast, uterus, and gallbladder.  Osteoarthritis.  High blood pressure (hypertension).  High cholesterol.  Sleep apnea.  Gallbladder stones.  Infertility problems.  What are the causes?  Common causes of this condition include:  Eating daily meals that are high in calories, sugar, and fat.  Being born with genes that may make you more likely to become obese.  Having a medical condition that causes obesity, including:  Hypothyroidism.  Polycystic ovarian syndrome (PCOS).  Binge-eating disorder.  Cushing syndrome.  Taking certain medicines, such as steroids, antidepressants, and seizure medicines.  Not being physically active (sedentary lifestyle).  Not getting enough sleep.  Drinking high amounts of sugar-sweetened beverages, such as soft drinks.  What increases the risk?  The following factors may make you more likely to develop this condition:  Having a family history of obesity.  Being a woman of  descent.  Being a man of  descent.  Living in an area with limited access to:  [place], recreation centers, or sidewalks.  Healthy food choices, such as grocery stores and [employer] markets.  What are the signs or symptoms?  The main sign of this condition is having too much body fat.  How is this diagnosed?  This condition is diagnosed based on:  Your BMI. If you are an adult with a BMI of 30 or higher, you are considered obese.  Your waist circumference. This measures the distance around your waistline.  Your skinfold thickness. Your health care  provider may gently pinch a fold of your skin and measure it.  You may have other tests to check for underlying conditions.  How is this treated?  Treatment for this condition often includes changing your lifestyle. Treatment may include some or all of the following:  Dietary changes. This may include developing a healthy meal plan.  Regular physical activity. This may include activity that causes your heart to beat faster (aerobic exercise) and strength training. Work with your health care provider to design an exercise program that works for you.  Medicine to help you lose weight if you are unable to lose 1 pound a week after 6 weeks of healthy eating and more physical activity.  Treating conditions that cause the obesity (underlying conditions).  Surgery. Surgical options may include gastric banding and gastric bypass. Surgery may be done if:  Other treatments have not helped to improve your condition.  You have a BMI of 40 or higher.  You have life-threatening health problems related to obesity.  Follow these instructions at home:  Eating and drinking    Follow recommendations from your health care provider about what you eat and drink. Your health care provider may advise you to:  Limit fast food, sweets, and processed snack foods.  Choose low-fat options, such as low-fat milk instead of whole milk.  Eat 5 or more servings of fruits or vegetables every day.  Eat at home more often. This gives you more control over what you eat.  Choose healthy foods when you eat out.  Learn to read food labels. This will help you understand how much food is considered 1 serving.  Learn what a healthy serving size is.  Keep low-fat snacks available.  Limit sugary drinks, such as soda, fruit juice, sweetened iced tea, and flavored milk.  Drink enough water to keep your urine pale yellow.  Do not follow a fad diet. Fad diets can be unhealthy and even dangerous.    Physical activity  Exercise regularly, as told by your health care  provider.  Most adults should get up to 150 minutes of moderate-intensity exercise every week.  Ask your health care provider what types of exercise are safe for you and how often you should exercise.  Warm up and stretch before being active.  Cool down and stretch after being active.  Rest between periods of activity.  Lifestyle  Work with your health care provider and a dietitian to set a weight-loss goal that is healthy and reasonable for you.  Limit your screen time.  Find ways to reward yourself that do not involve food.  Do not drink alcohol if:  Your health care provider tells you not to drink.  You are pregnant, may be pregnant, or are planning to become pregnant.  If you drink alcohol:  Limit how much you use to:  0-1 drink a day for women.  0-2 drinks a day for men.  Be aware of how much alcohol is in your drink. In the [place] one drink equals one 12 oz bottle of beer (355 mL), one 5 oz glass of wine (148 mL), or one 1½ oz glass of hard liquor (44 mL).  General instructions  Keep a weight-loss journal to keep track of the food you eat and how much exercise you get.  Take over-the-counter and prescription medicines only as told by your health care provider.  Take vitamins and supplements only as told by your health care provider.  Consider joining a support group. Your health care provider may be able to recommend a support group.  Keep all follow-up visits as told by your health care provider. This is important.  Contact a health care provider if:  You are unable to meet your weight loss goal after 6 weeks of dietary and lifestyle changes.  Get help right away if you are having:  Trouble breathing.  Suicidal thoughts or behaviors.  Summary  Obesity is the condition of having too much total body fat.  Being overweight or obese means that your weight is greater than what is considered healthy for your body size.  Work with your health care provider and a dietitian to set a weight-loss goal that is healthy and  reasonable for you.  Exercise regularly, as told by your health care provider. Ask your health care provider what types of exercise are safe for you and how often you should exercise.  This information is not intended to replace advice given to you by your health care provider. Make sure you discuss any questions you have with your health care provider.  Document Revised: 08/22/2019 Document Reviewed: 08/22/2019  Elsevier Patient Education © 2021 [employer].

## 2022-07-29 NOTE — PROGRESS NOTES
"Chief Complaint  Med Refill    Subjective        Kamryn Gross presents to [facility]  The patient is a 27-year-old female who is here today for her 6-month follow-up, the patient has migraine, depression, anxiety, PTSD and obesity.  The patient states that she is doing okay with her medications right now.  She is on the pulmonologist and was told that she has a sleep apnea but she is not able to use the CPAP because it suffocates her.  The patient states that migraine medicine is helping however she has run out of the medication.  She states that she has not really been doing any specific diets or exercise but she has not been eating a lot lately.  The patient still smokes but she has cut down and only smokes about 4 cigarettes a day.      Objective   Vital Signs:  /80 (BP Location: Right arm, Patient Position: Sitting, Cuff Size: Large Adult)   Pulse 95   Temp 97.1 °F (36.2 °C) (Temporal)   Resp 20   Ht 177.8 cm (70\")   Wt (!) 186 kg (409 lb 12.8 oz)   SpO2 98%   BMI 58.80 kg/m²   Estimated body mass index is 58.8 kg/m² as calculated from the following:    Height as of this encounter: 177.8 cm (70\").    Weight as of this encounter: 186 kg (409 lb 12.8 oz).          Physical Exam  Vitals and nursing note reviewed.   Constitutional:       Appearance: Normal appearance. She is obese.   HENT:      Head: Normocephalic and atraumatic.      Right Ear: Tympanic membrane and ear canal normal.      Left Ear: Tympanic membrane and ear canal normal.      Nose: Nose normal.      Mouth/Throat:      Mouth: Mucous membranes are moist.   Eyes:      Extraocular Movements: Extraocular movements intact.      Pupils: Pupils are equal, round, and reactive to light.   Cardiovascular:      Rate and Rhythm: Normal rate and regular rhythm.  No extrasystoles are present.     Heart sounds: Normal heart sounds. No murmur heard.  Pulmonary:      Effort: Pulmonary effort is normal.      Breath sounds: " Normal breath sounds. No decreased breath sounds, wheezing or rhonchi.   Abdominal:      Palpations: Abdomen is soft. There is no mass.      Tenderness: There is no abdominal tenderness. There is no right CVA tenderness, left CVA tenderness, guarding or rebound.   Musculoskeletal:         General: Normal range of motion.      Cervical back: Normal range of motion and neck supple.      Right lower leg: No edema.      Left lower leg: No edema.   Skin:     Findings: No rash.   Neurological:      General: No focal deficit present.      Mental Status: She is alert and oriented to person, place, and time.      Cranial Nerves: Cranial nerves are intact.      Sensory: Sensation is intact.      Motor: Motor function is intact.      Coordination: Coordination is intact.      Gait: Gait is intact.      Deep Tendon Reflexes: Reflexes are normal and symmetric.   Psychiatric:         Attention and Perception: Attention and perception normal.         Mood and Affect: Mood normal.         Speech: Speech normal.         Behavior: Behavior normal. Behavior is cooperative.         Thought Content: Thought content normal.        Result Review :                Assessment and Plan   Diagnoses and all orders for this visit:    1. Migraine without status migrainosus, not intractable, unspecified migraine type (Primary)  -     SUMAtriptan (Imitrex) 25 MG tablet; Take one tablet at onset of headache. May repeat dose one time in 2 hours if headache not relieved.  Dispense: 20 tablet; Refill: 5    2. Major depressive disorder, recurrent, moderate (HCC)    3. Generalized anxiety disorder    4. PTSD (post-traumatic stress disorder)    5. Class 3 severe obesity without serious comorbidity with body mass index (BMI) of 50.0 to 59.9 in adult, unspecified obesity type (HCC)    6. Sleep apnea, unspecified type    7. Smoker             Follow Up   Return in about 6 months (around 1/29/2023) for Annual physical, Blood work.  Patient was given  instructions and counseling regarding her condition or for health maintenance advice. Please see specific information pulled into the AVS if appropriate.     The patient's Imitrex was refilled as above.  The patient was advised to continue all her regular medications as prescribed.  She was again counseled regarding the importance of quitting smoking, diet, exercise and medication compliance.        This document has been electronically signed by Osmany Mathur MD  July 29, 2022 11:07 EDT

## 2022-08-01 ENCOUNTER — TELEMEDICINE (OUTPATIENT)
Dept: PSYCHIATRY | Facility: CLINIC | Age: 27
End: 2022-08-01

## 2022-08-01 DIAGNOSIS — F33.1 MAJOR DEPRESSIVE DISORDER, RECURRENT EPISODE, MODERATE: Primary | Chronic | ICD-10-CM

## 2022-08-01 DIAGNOSIS — F43.10 POST TRAUMATIC STRESS DISORDER (PTSD): ICD-10-CM

## 2022-08-01 DIAGNOSIS — F41.9 ANXIETY DISORDER, UNSPECIFIED TYPE: Chronic | ICD-10-CM

## 2022-08-01 DIAGNOSIS — Z86.59 HISTORY OF BORDERLINE PERSONALITY DISORDER: ICD-10-CM

## 2022-08-01 DIAGNOSIS — F51.5 NIGHTMARES: ICD-10-CM

## 2022-08-01 PROCEDURE — 99214 OFFICE O/P EST MOD 30 MIN: CPT | Performed by: NURSE PRACTITIONER

## 2022-08-01 RX ORDER — PRAZOSIN HYDROCHLORIDE 2 MG/1
2 CAPSULE ORAL NIGHTLY
Qty: 30 CAPSULE | Refills: 0 | Status: SHIPPED | OUTPATIENT
Start: 2022-08-01 | End: 2022-08-29 | Stop reason: SDUPTHER

## 2022-08-01 RX ORDER — ARIPIPRAZOLE 15 MG/1
7.5 TABLET ORAL DAILY
Qty: 15 TABLET | Refills: 0 | Status: SHIPPED | OUTPATIENT
Start: 2022-08-01 | End: 2022-08-29 | Stop reason: SDUPTHER

## 2022-08-01 RX ORDER — VORTIOXETINE 10 MG/1
10 TABLET, FILM COATED ORAL
Qty: 30 TABLET | Refills: 0 | Status: SHIPPED | OUTPATIENT
Start: 2022-08-01 | End: 2022-08-29 | Stop reason: SDUPTHER

## 2022-08-01 RX ORDER — BUPROPION HYDROCHLORIDE 150 MG/1
150 TABLET ORAL EVERY MORNING
Qty: 30 TABLET | Refills: 0 | Status: SHIPPED | OUTPATIENT
Start: 2022-08-01 | End: 2022-08-29 | Stop reason: SDUPTHER

## 2022-08-01 RX ORDER — BUPROPION HYDROCHLORIDE 300 MG/1
300 TABLET ORAL EVERY MORNING
Qty: 30 TABLET | Refills: 0 | Status: SHIPPED | OUTPATIENT
Start: 2022-08-01 | End: 2022-08-29 | Stop reason: SDUPTHER

## 2022-08-01 NOTE — PROGRESS NOTES
This provider is located at the [facility] (through [facility]), [address], [facility], [address] using a secure [employer] Video Visit through [employer]. Patient is being seen remotely via telehealth at their home address in [place], and stated they are in a secure environment for this session. The patient's condition being diagnosed/treated is appropriate for telemedicine. The provider identified herself as well as her credentials.   The patient, and/or patients guardian, consent to be seen remotely, and when consent is given they understand that the consent allows for patient identifiable information to be sent to a third party as needed.   They may refuse to be seen remotely at any time. The electronic data is encrypted and password protected, and the patient and/or guardian has been advised of the potential risks to privacy not withstanding such measures.    You have chosen to receive care through a telehealth visit.  Do you consent to use a video/audio connection for your medical care today? Yes        Subjective   Kamryn Gross is a 27 y.o. female who presents today for follow up    Chief Complaint: Depression, anxiety, history of PTSD, and history of nightmares follow-up    Accompanied by: The patient reports she is alone at today's encounter    History of Present Illness:   The patient describes her mood as both depressed and anxious since her last encounter with this APRN.  The patient states she has not really noticed any difference at all in her moods since stopping Pristiq and starting Trintellix.  The patient rates her symptoms of depression at a 9/10 on a 0-10 scale, with 10 being the worst.  The patient reports her symptoms of depression as sleeping a lot, and not wanting to do anything.  The patient rates her symptoms of anxiety at a 6/10 on a 0-10 scale, with 10 being the worst.  The patient reports her symptoms of anxiety as chewing/biting her fingers and  "inside of her cheek, feeling nervous, and shaking her legs at times when she feels nervous.  The patient reports her anxiety comes out of the blue, and does not have to be triggered by anything.  The patient reports her appetite as good.  The patient reports she is trying to cut back her food intake to lose weight.  The patient reports her sleep as good, probably too good, as she reports she wants to sleep all the time and wakes up with headaches a lot because she slept too much.  The patient reports occasional recent nightmares.  The patient reports she has not used her CPAP machine in a while as the mask \"darrell me\".  The patient denies any new medical problems or changes in medications since last appointment with this facility.  The patient reports compliance with her current medication regimen.  The patient denies any side effects or concerns from her current medication regimen.  The patient denies any abnormal musculoskeletal movements or tics.   The patient would like to adjust her medications at this visit to help with her continued worsened moods, especially her depressive symptoms.  The patient denies any suicidal or homicidal ideations, plans, or intent at today's encounter and is convincing.  The patient denies any auditory hallucinations or visual hallucinations.  The patient does not endorse any significant symptoms consistent with matt or psychosis at today's encounter.        Prior Psychiatric Medications:  Prozac  Zoloft  Trileptal  Paxil  Hydroxyzine  Prazosin  Desvenlafaxine/Pristiq  Wellbutrin XL  Abilify  Trintellix        Last Menstrual Period:  3 weeks ago.  The patient was educated that her prescribed medications can have potential risk to a developing fetus. The patient is advised to contact this APRN/this office if she becomes pregnant or plans to become pregnant.  Pt verbalizes understanding and acknowledged agreement with this plan in her own words.        The following portions of the " patient's history were reviewed and updated as appropriate: allergies, current medications, past family history, past medical history, past social history, past surgical history and problem list.          Past Medical History:  Past Medical History:   Diagnosis Date   • Anxiety    • Bipolar disorder (HCC)    • Depression    • Headache    • Obesity    • Panic disorder    • PTSD (post-traumatic stress disorder)    • Self-injurious behavior    • Sleep apnea    • Suicide attempt (HCC)     attempted overdose in 2017       Social History:  Social History     Socioeconomic History   • Marital status: Single   Tobacco Use   • Smoking status: Current Every Day Smoker     Packs/day: 0.50     Years: 8.00     Pack years: 4.00     Start date: 2014   • Smokeless tobacco: Never Used   Vaping Use   • Vaping Use: Never used   Substance and Sexual Activity   • Alcohol use: Not Currently   • Drug use: Not Currently     Types: Marijuana   • Sexual activity: Yes     Partners: Male     Birth control/protection: None       Family History:  Family History   Problem Relation Age of Onset   • Diabetes Mother    • Anxiety disorder Mother    • Depression Mother    • Anxiety disorder Father    • Depression Father    • Anxiety disorder Sister    • Depression Sister    • Depression Maternal Aunt    • Heart attack Paternal Aunt    • Anxiety disorder Paternal Aunt    • Depression Paternal Aunt    • No Known Problems Maternal Uncle    • No Known Problems Paternal Uncle    • Lung disease Maternal Grandfather    • Alzheimer's disease Maternal Grandmother    • Lung cancer Paternal Grandfather    • Alzheimer's disease Paternal Grandmother    • Alcohol abuse Paternal Grandmother    • Anxiety disorder Sister    • Depression Sister    • Anxiety disorder Paternal Aunt    • Depression Paternal Aunt    • No Known Problems Maternal Uncle    • No Known Problems Paternal Uncle    • Heart attack Paternal Uncle    • Other Paternal Uncle        Past Surgical  History:  Past Surgical History:   Procedure Laterality Date   • TONSILLECTOMY         Problem List:  Patient Active Problem List   Diagnosis   • Major depressive disorder, recurrent, moderate (HCC)   • Class 3 severe obesity without serious comorbidity with body mass index (BMI) of 50.0 to 59.9 in adult ([facility])   • Smoker   • Migraine without aura and without status migrainosus, not intractable   • Generalized anxiety disorder   • History of sleep apnea   • PTSD (post-traumatic stress disorder)   • Sleep apnea       Allergy:   No Known Allergies     Current Medications:   Current Outpatient Medications   Medication Sig Dispense Refill   • ARIPiprazole (Abilify) 15 MG tablet Take 0.5 tablets by mouth Daily. 15 tablet 0   • buPROPion XL (WELLBUTRIN XL) 150 MG 24 hr tablet Take 1 tablet by mouth Every Morning. 30 tablet 0   • buPROPion XL (WELLBUTRIN XL) 300 MG 24 hr tablet Take 1 tablet by mouth Every Morning. 30 tablet 0   • prazosin (MINIPRESS) 2 MG capsule Take 1 capsule by mouth Every Night. 30 capsule 0   • SUMAtriptan (Imitrex) 25 MG tablet Take one tablet at onset of headache. May repeat dose one time in 2 hours if headache not relieved. 20 tablet 5   • Vortioxetine HBr (Trintellix) 10 MG tablet Take 10 mg by mouth Daily With Breakfast. 30 tablet 0     No current facility-administered medications for this visit.       Review of Symptoms:    Review of Systems   Psychiatric/Behavioral: Positive for decreased concentration, sleep disturbance, depressed mood and stress. Negative for agitation, dysphoric mood, hallucinations, self-injury, suicidal ideas and negative for hyperactivity. The patient is nervous/anxious.          Physical Exam:   There were no vitals taken for this visit. There is no height or weight on file to calculate BMI.   Due to the remote nature of this encounter (virtual encounter), vitals were unable to be obtained.  Height stated at 69 inches.  Weight stated at around 400 pounds.      Physical  Exam  Constitutional:       Appearance: Normal appearance.   Neurological:      Mental Status: She is alert and oriented to person, place, and time.   Psychiatric:         Attention and Perception: Attention normal.         Mood and Affect: Mood is depressed. Affect is blunt.         Speech: Speech normal.         Behavior: Behavior normal. Behavior is cooperative.         Thought Content: Thought content normal. Thought content is not paranoid or delusional. Thought content does not include homicidal or suicidal ideation. Thought content does not include homicidal or suicidal plan.         Cognition and Memory: Cognition and memory normal.         Judgment: Judgment normal.           Mental Status Exam:   Hygiene:   good  Cooperation:  Cooperative  Eye Contact:  Good  Psychomotor Behavior:  Appropriate  Affect:  Blunted  Mood: depressed  Hopelessness: Denies  Speech:  Normal  Thought Process:  Linear  Thought Content:  Mood congruent  Suicidal:  None  Homicidal:  None  Hallucinations:  None  Delusion:  None  Memory:  Intact  Orientation:  Person, Place, Time and Situation  Reliability:  good  Insight:  Good  Judgement:  Good  Impulse Control:  Good  Physical/Medical Issues:  No           Lab Results:   No visits with results within 1 Month(s) from this visit.   Latest known visit with results is:   Office Visit on 01/12/2022   Component Date Value Ref Range Status   • Glucose 01/12/2022 95  65 - 99 mg/dL Final   • BUN 01/12/2022 9  6 - 20 mg/dL Final   • Creatinine 01/12/2022 1.01 (A) 0.57 - 1.00 mg/dL Final   • Sodium 01/12/2022 142  136 - 145 mmol/L Final   • Potassium 01/12/2022 4.2  3.5 - 5.2 mmol/L Final   • Chloride 01/12/2022 106  98 - 107 mmol/L Final   • CO2 01/12/2022 27.3  22.0 - 29.0 mmol/L Final   • Calcium 01/12/2022 9.2  8.6 - 10.5 mg/dL Final   • Total Protein 01/12/2022 7.5  6.0 - 8.5 g/dL Final   • Albumin 01/12/2022 4.23  3.50 - 5.20 g/dL Final   • ALT (SGPT) 01/12/2022 32  1 - 33 U/L Final   •  AST (SGOT) 01/12/2022 20  1 - 32 U/L Final   • Alkaline Phosphatase 01/12/2022 82  39 - 117 U/L Final   • Total Bilirubin 01/12/2022 0.3  0.0 - 1.2 mg/dL Final   • eGFR Non  Amer 01/12/2022 67  >60 mL/min/1.73 Final   • Globulin 01/12/2022 3.3  gm/dL Final   • A/G Ratio 01/12/2022 1.3  g/dL Final   • BUN/Creatinine Ratio 01/12/2022 8.9  7.0 - 25.0 Final   • Anion Gap 01/12/2022 8.7  5.0 - 15.0 mmol/L Final   • Total Cholesterol 01/12/2022 145  0 - 200 mg/dL Final   • Triglycerides 01/12/2022 100  0 - 150 mg/dL Final   • HDL Cholesterol 01/12/2022 33 (A) 40 - 60 mg/dL Final   • LDL Cholesterol  01/12/2022 93  0 - 100 mg/dL Final   • VLDL Cholesterol 01/12/2022 19  5 - 40 mg/dL Final   • LDL/HDL Ratio 01/12/2022 2.79   Final   • TSH 01/12/2022 2.790  0.270 - 4.200 uIU/mL Final   • Color 01/12/2022 Dark Yellow  Yellow, Straw, Dark Yellow, Kizzy Final   • Clarity, UA 01/12/2022 Clear  Clear Final   • Glucose, UA 01/12/2022 Negative  Negative, 1000 mg/dL (3+) mg/dL Final   • Bilirubin 01/12/2022 Negative  Negative Final   • Ketones, UA 01/12/2022 Negative  Negative Final   • Specific Gravity  01/12/2022 1.030  1.005 - 1.030 Final   • Blood, UA 01/12/2022 3+ (A) Negative Final   • pH, Urine 01/12/2022 6.0  5.0 - 8.0 Final   • Protein, POC 01/12/2022 Trace (A) Negative mg/dL Final   • Urobilinogen, UA 01/12/2022 Normal  Normal Final   • Leukocytes 01/12/2022 Negative  Negative Final   • Nitrite, UA 01/12/2022 Positive (A) Negative Final   • WBC 01/12/2022 9.57  3.40 - 10.80 10*3/mm3 Final   • RBC 01/12/2022 5.31 (A) 3.77 - 5.28 10*6/mm3 Final   • Hemoglobin 01/12/2022 12.5  12.0 - 15.9 g/dL Final   • Hematocrit 01/12/2022 42.0  34.0 - 46.6 % Final   • MCV 01/12/2022 79.1  79.0 - 97.0 fL Final   • MCH 01/12/2022 23.5 (A) 26.6 - 33.0 pg Final   • MCHC 01/12/2022 29.8 (A) 31.5 - 35.7 g/dL Final   • RDW 01/12/2022 16.5 (A) 12.3 - 15.4 % Final   • RDW-SD 01/12/2022 46.5  37.0 - 54.0 fl Final   • MPV 01/12/2022 10.8  6.0  - 12.0 fL Final   • Platelets 01/12/2022 347  140 - 450 10*3/mm3 Final   • Neutrophil % 01/12/2022 58.5  42.7 - 76.0 % Final   • Lymphocyte % 01/12/2022 31.3  19.6 - 45.3 % Final   • Monocyte % 01/12/2022 6.4  5.0 - 12.0 % Final   • Eosinophil % 01/12/2022 3.4  0.3 - 6.2 % Final   • Basophil % 01/12/2022 0.2  0.0 - 1.5 % Final   • Immature Grans % 01/12/2022 0.2  0.0 - 0.5 % Final   • Neutrophils, Absolute 01/12/2022 5.59  1.70 - 7.00 10*3/mm3 Final   • Lymphocytes, Absolute 01/12/2022 3.00  0.70 - 3.10 10*3/mm3 Final   • Monocytes, Absolute 01/12/2022 0.61  0.10 - 0.90 10*3/mm3 Final   • Eosinophils, Absolute 01/12/2022 0.33  0.00 - 0.40 10*3/mm3 Final   • Basophils, Absolute 01/12/2022 0.02  0.00 - 0.20 10*3/mm3 Final   • Immature Grans, Absolute 01/12/2022 0.02  0.00 - 0.05 10*3/mm3 Final   • nRBC 01/12/2022 0.0  0.0 - 0.2 /100 WBC Final         Assessment & Plan   Problems Addressed this Visit    None     Visit Diagnoses     Major depressive disorder, recurrent episode, moderate (HCC)  (Chronic)   -  Primary    Relevant Medications    buPROPion XL (WELLBUTRIN XL) 300 MG 24 hr tablet    buPROPion XL (WELLBUTRIN XL) 150 MG 24 hr tablet    ARIPiprazole (Abilify) 15 MG tablet    Vortioxetine HBr (Trintellix) 10 MG tablet    Anxiety disorder, unspecified type  (Chronic)       Relevant Medications    buPROPion XL (WELLBUTRIN XL) 300 MG 24 hr tablet    buPROPion XL (WELLBUTRIN XL) 150 MG 24 hr tablet    ARIPiprazole (Abilify) 15 MG tablet    Vortioxetine HBr (Trintellix) 10 MG tablet    Nightmares        Relevant Medications    prazosin (MINIPRESS) 2 MG capsule    buPROPion XL (WELLBUTRIN XL) 300 MG 24 hr tablet    buPROPion XL (WELLBUTRIN XL) 150 MG 24 hr tablet    ARIPiprazole (Abilify) 15 MG tablet    Vortioxetine HBr (Trintellix) 10 MG tablet    Post traumatic stress disorder (PTSD)        Relevant Medications    buPROPion XL (WELLBUTRIN XL) 300 MG 24 hr tablet    buPROPion XL (WELLBUTRIN XL) 150 MG 24 hr tablet     ARIPiprazole (Abilify) 15 MG tablet    Vortioxetine HBr (Trintellix) 10 MG tablet    History of borderline personality disorder          Diagnoses       Codes Comments    Major depressive disorder, recurrent episode, moderate (HCC)    -  Primary ICD-10-CM: F33.1  ICD-9-CM: 296.32     Anxiety disorder, unspecified type     ICD-10-CM: F41.9  ICD-9-CM: 300.00     Nightmares     ICD-10-CM: F51.5  ICD-9-CM: 307.47     Post traumatic stress disorder (PTSD)     ICD-10-CM: F43.10  ICD-9-CM: 309.81     History of borderline personality disorder     ICD-10-CM: Z86.59  ICD-9-CM: V11.8           Visit Diagnoses:    ICD-10-CM ICD-9-CM   1. Major depressive disorder, recurrent episode, moderate (HCC)  F33.1 296.32   2. Anxiety disorder, unspecified type  F41.9 300.00   3. Nightmares  F51.5 307.47   4. Post traumatic stress disorder (PTSD)  F43.10 309.81   5. History of borderline personality disorder  Z86.59 V11.8          GOALS:  Short Term Goals: Patient will be compliant with medication, and patient will have no significant medication related side effects.  Patient will be engaged in psychotherapy as indicated.  Patient will report subjective improvement of symptoms.  Long term goals: To stabilize mood and treat/improve subjective symptoms, the patient will stay out of the hospital, the patient will be at an optimal level of functioning, and the patient will take all medications as prescribed.  The patient verbalized understanding and agreement with goals that were mutually set.      TREATMENT PLAN: Restart supportive psychotherapy efforts and TAKE  medications as indicated.  The patient declines restarting psychotherapy at today's encounter.  Medication and treatment options, both pharmacological and non-pharmacological treatment options, discussed during today's visit, including any off label use of medication. Patient acknowledged and verbally consented with current treatment plan and was educated on the importance of  compliance with treatment and follow-up appointments.      - Continue Abilify 7.5 mg by mouth once daily for mood.  - Continue Wellbutrin  mg by mouth once daily for mood.  - Continue prazosin 2 mg by mouth once daily at bedtime for PTSD nightmares.  - Increase Trintellix to 10 mg by mouth once daily for mood.      MEDICATION ISSUES:  Discussed medication options and treatment plan of prescribed medication, any off label use of medication, as well as the risks, benefits, any black box warnings including increased suicidality, and side effects including but not limited to potential falls, dizziness, possible impaired driving, GI side effects (change in appetite, abdominal discomfort, nausea, vomiting, diarrhea, and/or constipation), dry mouth, somnolence, sedation, insomnia, activation, agitation, irritation, tremors, abnormal muscle movements or disorders, tardive dyskinesia, akathisia, asthenia, headache, sweating, possible bruising or rare bleeding, electrolyte and/or fluid abnormalities, change in blood pressure/heart rate/and or heart rhythm, hypotension, sexual dysfunction, rare impulse control problems, rare seizures, rare neuroleptic malignant syndrome, increased risk of death and cerebrovascular events, change in blood glucose and increased risk for diabetes, change in triglycerides and cholesterol and increased risk for dyslipidemia,  weight gain, weight gain that can become problematic to health, skin conditions and reactions, and metabolic adversities among others. Patient and/or guardian are agreeable to call the office with any worsening of symptoms or onset of side effects, or if any concerns or questions arise.  The contact information for the office is made available to the patient and/or guardian. Patient and/or guardian are agreeable to call 911 or go to the nearest ER should they begin having any SI/HI, or if any urgent concerns arise.      SUICIDE RISK ASSESSMENT AND SAFETY PLAN:  Unalterable demographics and a history of mental health intervention indicate this patient is in a high risk category compared to the general population. At present, the patient denies active SI/HI, intentions, or plans at this time and agrees to seek immediate care should such thoughts develop. The patient verbalizes understanding of how to access emergency care if needed and agrees to do so. Consideration of suicide risk and protective factors such as history, current presentation, individual strengths and weaknesses, psychosocial and environmental stressors and variables, psychiatric illness and symptoms, medical conditions and pain, took place in this interview. Based on those considerations, the patient is determined: within individual baseline and presenting no imminent risk for suicide or homicide. Other recommendations: The patient does not meet the criteria for inpatient admission and is not a safety risk to self or others at today's visit. Inpatient treatment offers no significant advantages over outpatient treatment for this patient at today's visit.  The patient was given ample time for questions and fully participated in treatment planning.  The patient was encouraged to call the clinic with any questions or concerns.  The patient was informed of access to emergency care. If patient were to develop any significant symptomatology, suicidal ideation, homicidal ideation, any concerns, or feel unsafe at any time they are to call the clinic and if unable to get immediate assistance should immediately call 911 or go to the nearest emergency room.  Patient contracted verbally for the following: If you are experiencing an emotional crisis or have thoughts of harming yourself or others, please go to your nearest local emergency room or call 911. Will continue to re-assess medication response and side effects frequently to establish efficacy and ensure safety. Risks, any black box warnings, side effects, off  label usage, and benefits of medication and treatment discussed with patient, along with potential adverse side effects of current and/or newly prescribed medication, alternative treatment options, and OTC medications.  Patient verbalized understanding of potential risks, any off label use of medication, any black box warnings, and any side effects in their own words. The patient verbalized understanding and agreed to comply with the safety plan discussed in their own words.  Patient given the number to the office. Number also discussed of the 24- hour suicide hotline.         MEDS ORDERED DURING VISIT:  New Medications Ordered This Visit   Medications   • prazosin (MINIPRESS) 2 MG capsule     Sig: Take 1 capsule by mouth Every Night.     Dispense:  30 capsule     Refill:  0   • buPROPion XL (WELLBUTRIN XL) 300 MG 24 hr tablet     Sig: Take 1 tablet by mouth Every Morning.     Dispense:  30 tablet     Refill:  0   • buPROPion XL (WELLBUTRIN XL) 150 MG 24 hr tablet     Sig: Take 1 tablet by mouth Every Morning.     Dispense:  30 tablet     Refill:  0   • ARIPiprazole (Abilify) 15 MG tablet     Sig: Take 0.5 tablets by mouth Daily.     Dispense:  15 tablet     Refill:  0   • Vortioxetine HBr (Trintellix) 10 MG tablet     Sig: Take 10 mg by mouth Daily With Breakfast.     Dispense:  30 tablet     Refill:  0       Return in about 4 weeks (around 8/29/2022), or if symptoms worsen or fail to improve, for Next scheduled follow up and Recheck.         Functional Status: Moderate impairment     Prognosis: Fair with Ongoing Treatment             This document has been electronically signed by ELISE Dye  August 1, 2022 16:56 EDT    Some of the data in this electronic note has been brought forward from a previous encounter, any necessary changes have been made, it has been reviewed by this APRN, and it is accurate.    Please note that portions of this note were completed with a voice recognition program.

## 2022-08-29 ENCOUNTER — TELEMEDICINE (OUTPATIENT)
Dept: PSYCHIATRY | Facility: CLINIC | Age: 27
End: 2022-08-29

## 2022-08-29 DIAGNOSIS — F43.10 POST TRAUMATIC STRESS DISORDER (PTSD): ICD-10-CM

## 2022-08-29 DIAGNOSIS — Z86.59 HISTORY OF BORDERLINE PERSONALITY DISORDER: ICD-10-CM

## 2022-08-29 DIAGNOSIS — F33.1 MAJOR DEPRESSIVE DISORDER, RECURRENT EPISODE, MODERATE: Primary | Chronic | ICD-10-CM

## 2022-08-29 DIAGNOSIS — F41.9 ANXIETY DISORDER, UNSPECIFIED TYPE: Chronic | ICD-10-CM

## 2022-08-29 DIAGNOSIS — F51.5 NIGHTMARES: ICD-10-CM

## 2022-08-29 PROCEDURE — 99214 OFFICE O/P EST MOD 30 MIN: CPT | Performed by: NURSE PRACTITIONER

## 2022-08-29 RX ORDER — PRAZOSIN HYDROCHLORIDE 2 MG/1
2 CAPSULE ORAL NIGHTLY
Qty: 30 CAPSULE | Refills: 0 | Status: SHIPPED | OUTPATIENT
Start: 2022-08-29 | End: 2022-09-26 | Stop reason: SDUPTHER

## 2022-08-29 RX ORDER — BUPROPION HYDROCHLORIDE 300 MG/1
300 TABLET ORAL EVERY MORNING
Qty: 30 TABLET | Refills: 0 | Status: SHIPPED | OUTPATIENT
Start: 2022-08-29 | End: 2022-09-26 | Stop reason: SDUPTHER

## 2022-08-29 RX ORDER — ARIPIPRAZOLE 15 MG/1
7.5 TABLET ORAL DAILY
Qty: 15 TABLET | Refills: 0 | Status: SHIPPED | OUTPATIENT
Start: 2022-08-29 | End: 2022-09-26 | Stop reason: SDUPTHER

## 2022-08-29 RX ORDER — BUPROPION HYDROCHLORIDE 150 MG/1
150 TABLET ORAL EVERY MORNING
Qty: 30 TABLET | Refills: 0 | Status: SHIPPED | OUTPATIENT
Start: 2022-08-29 | End: 2022-09-26 | Stop reason: SDUPTHER

## 2022-09-26 ENCOUNTER — TELEMEDICINE (OUTPATIENT)
Dept: PSYCHIATRY | Facility: CLINIC | Age: 27
End: 2022-09-26

## 2022-09-26 DIAGNOSIS — F41.9 ANXIETY DISORDER, UNSPECIFIED TYPE: Chronic | ICD-10-CM

## 2022-09-26 DIAGNOSIS — Z86.59 HISTORY OF BORDERLINE PERSONALITY DISORDER: ICD-10-CM

## 2022-09-26 DIAGNOSIS — F51.5 NIGHTMARES: ICD-10-CM

## 2022-09-26 DIAGNOSIS — F33.1 MAJOR DEPRESSIVE DISORDER, RECURRENT EPISODE, MODERATE: Primary | Chronic | ICD-10-CM

## 2022-09-26 DIAGNOSIS — R44.3 HALLUCINATIONS: ICD-10-CM

## 2022-09-26 DIAGNOSIS — G47.9 SLEEPING DIFFICULTIES: ICD-10-CM

## 2022-09-26 DIAGNOSIS — F43.10 POST TRAUMATIC STRESS DISORDER (PTSD): Chronic | ICD-10-CM

## 2022-09-26 PROCEDURE — 99214 OFFICE O/P EST MOD 30 MIN: CPT | Performed by: NURSE PRACTITIONER

## 2022-09-26 RX ORDER — BUPROPION HYDROCHLORIDE 150 MG/1
150 TABLET ORAL EVERY MORNING
Qty: 30 TABLET | Refills: 0 | Status: SHIPPED | OUTPATIENT
Start: 2022-09-26 | End: 2022-12-28 | Stop reason: SDUPTHER

## 2022-09-26 RX ORDER — PRAZOSIN HYDROCHLORIDE 2 MG/1
2 CAPSULE ORAL NIGHTLY
Qty: 30 CAPSULE | Refills: 0 | Status: SHIPPED | OUTPATIENT
Start: 2022-09-26 | End: 2022-12-28 | Stop reason: SDUPTHER

## 2022-09-26 RX ORDER — BUPROPION HYDROCHLORIDE 300 MG/1
300 TABLET ORAL EVERY MORNING
Qty: 30 TABLET | Refills: 0 | Status: SHIPPED | OUTPATIENT
Start: 2022-09-26 | End: 2022-12-28 | Stop reason: SDUPTHER

## 2022-09-26 RX ORDER — ARIPIPRAZOLE 15 MG/1
7.5 TABLET ORAL DAILY
Qty: 15 TABLET | Refills: 0 | Status: SHIPPED | OUTPATIENT
Start: 2022-09-26 | End: 2022-12-28 | Stop reason: SDUPTHER

## 2022-09-26 NOTE — PROGRESS NOTES
This provider is located at the [facility] (through [facility]), [address], [facility], [address] using a secure [employer] Video Visit through [employer]. Patient is being seen remotely via telehealth at their home address in [place], and stated they are in a secure environment for this session. The patient's condition being diagnosed/treated is appropriate for telemedicine. The provider identified herself as well as her credentials.   The patient, and/or patients guardian, consent to be seen remotely, and when consent is given they understand that the consent allows for patient identifiable information to be sent to a third party as needed.   They may refuse to be seen remotely at any time. The electronic data is encrypted and password protected, and the patient and/or guardian has been advised of the potential risks to privacy not withstanding such measures.    You have chosen to receive care through a telehealth visit.  Do you consent to use a video/audio connection for your medical care today? Yes    Subjective   Kamryn Gross is a 27 y.o. female who presents today for follow up    Chief Complaint: Depression, anxiety, history of PTSD, history of nightmares, and history of hallucinations follow-up    Accompanied by: The patient reports she is alone at today's encounter    History of Present Illness:   The patient describes her mood as good overall and improved since her last encounter with this APRN.  The patient states she recently moved to [place] with her boyfriend, and they are currently living with her boyfriend's grandparents.  The patient reports her and her boyfriend have obtained transportation, and working on improving their lives.  The patient reports they have a goal to one day be able to have their own place together.  The patient rates her symptoms of depression at a 3/10 on a 0-10 scale, with 10 being the worst.  The patient rates her symptoms of  anxiety at a 0/10 on a 0-10 scale, with 10 being the worst.  The patient reports her appetite as good.  The patient reports she is no longer binge eating and has been managing her portion sizes, and she reports she has lost around 30 pounds with her dietary changes.  The patient denies any food restrictions or symptoms of anorexia or bulimia.  The patient reports she feels better overall physically and mentally with her weight loss and improvement in her health.  The patient reports she plans to start gentle walking soon.  The patient reports her sleep as improved and doing good overall.  The patient reports averaging approximately 8 to 9 hours of sleep each night.  The patient denies any recent nightmares.  The patient denies any new medical problems or changes in medications since last appointment with this facility.  The patient reports compliance with her current medication regimen.  The patient denies any side effects or concerns from her current medication regimen.  The patient denies any abnormal musculoskeletal movements or tics.   The patient would like to not adjust or change her medications at this visit.  The patient denies any suicidal or homicidal ideations, plans, or intent at today's encounter and is convincing.  The patient reports she still sees shadow figures intermittently, and this has not changed since she was about 12 years of age.  The patient denies any command hallucinations.   The patient denies any other auditory hallucinations or visual hallucinations.  The patient does not endorse any significant symptoms consistent with matt or psychosis at today's encounter.        Prior Psychiatric Medications:  Prozac  Zoloft  Trileptal  Paxil  Hydroxyzine  Desvenlafaxine/Pristiq  Prazosin  Wellbutrin XL  Abilify  Trintellix      Last Menstrual Period:  3-4 weeks ago.  The patient was educated that her prescribed medications can have potential risk to a developing fetus. The patient is advised to  contact this APRN/this office if she becomes pregnant or plans to become pregnant.  Pt verbalizes understanding and acknowledged agreement with this plan in her own words.        The following portions of the patient's history were reviewed and updated as appropriate: allergies, current medications, past family history, past medical history, past social history, past surgical history and problem list.          Past Medical History:  Past Medical History:   Diagnosis Date   • Anxiety    • Bipolar disorder (HCC)    • Depression    • Headache    • Obesity    • Panic disorder    • PTSD (post-traumatic stress disorder)    • Self-injurious behavior    • Sleep apnea    • Suicide attempt (HCC)     attempted overdose in 2017       Social History:  Social History     Socioeconomic History   • Marital status: Single   Tobacco Use   • Smoking status: Current Every Day Smoker     Packs/day: 0.50     Years: 8.00     Pack years: 4.00     Start date: 2014   • Smokeless tobacco: Never Used   Vaping Use   • Vaping Use: Never used   Substance and Sexual Activity   • Alcohol use: Not Currently   • Drug use: Not Currently     Types: Marijuana   • Sexual activity: Yes     Partners: Male     Birth control/protection: None       Family History:  Family History   Problem Relation Age of Onset   • Diabetes Mother    • Anxiety disorder Mother    • Depression Mother    • Anxiety disorder Father    • Depression Father    • Anxiety disorder Sister    • Depression Sister    • Depression Maternal Aunt    • Heart attack Paternal Aunt    • Anxiety disorder Paternal Aunt    • Depression Paternal Aunt    • No Known Problems Maternal Uncle    • No Known Problems Paternal Uncle    • Lung disease Maternal Grandfather    • Alzheimer's disease Maternal Grandmother    • Lung cancer Paternal Grandfather    • Alzheimer's disease Paternal Grandmother    • Alcohol abuse Paternal Grandmother    • Anxiety disorder Sister    • Depression Sister    • Anxiety  disorder Paternal Aunt    • Depression Paternal Aunt    • No Known Problems Maternal Uncle    • No Known Problems Paternal Uncle    • Heart attack Paternal Uncle    • Other Paternal Uncle        Past Surgical History:  Past Surgical History:   Procedure Laterality Date   • TONSILLECTOMY         Problem List:  Patient Active Problem List   Diagnosis   • Major depressive disorder, recurrent, moderate (HCC)   • Class 3 severe obesity without serious comorbidity with body mass index (BMI) of 50.0 to 59.9 in adult (HCC)   • Smoker   • Migraine without aura and without status migrainosus, not intractable   • Generalized anxiety disorder   • History of sleep apnea   • PTSD (post-traumatic stress disorder)   • Sleep apnea       Allergy:   No Known Allergies     Current Medications:   Current Outpatient Medications   Medication Sig Dispense Refill   • ARIPiprazole (Abilify) 15 MG tablet Take 0.5 tablets by mouth Daily. 15 tablet 0   • buPROPion XL (WELLBUTRIN XL) 150 MG 24 hr tablet Take 1 tablet by mouth Every Morning. 30 tablet 0   • buPROPion XL (WELLBUTRIN XL) 300 MG 24 hr tablet Take 1 tablet by mouth Every Morning. 30 tablet 0   • prazosin (MINIPRESS) 2 MG capsule Take 1 capsule by mouth Every Night. 30 capsule 0   • Vortioxetine HBr (Trintellix) 10 MG tablet tablet Take 10 mg by mouth Daily With Breakfast. 30 tablet 0   • SUMAtriptan (Imitrex) 25 MG tablet Take one tablet at onset of headache. May repeat dose one time in 2 hours if headache not relieved. 20 tablet 5     No current facility-administered medications for this visit.       Review of Symptoms:    Review of Systems   Psychiatric/Behavioral: Positive for hallucinations, depressed mood and stress. Negative for agitation, behavioral problems, decreased concentration, dysphoric mood, self-injury, suicidal ideas and negative for hyperactivity. The patient is not nervous/anxious.          Physical Exam:   There were no vitals taken for this visit. There is no  height or weight on file to calculate BMI.   Due to the remote nature of this encounter (virtual encounter), vitals were unable to be obtained.  Height stated at 69 inches.  Weight stated at around 370 pounds.      Physical Exam  Constitutional:       Appearance: Normal appearance.   Neurological:      Mental Status: She is alert and oriented to person, place, and time.   Psychiatric:         Attention and Perception: Attention normal. She is attentive.         Mood and Affect: Mood and affect normal.         Speech: Speech normal.         Behavior: Behavior normal. Behavior is cooperative.         Thought Content: Thought content normal. Thought content is not paranoid or delusional. Thought content does not include homicidal or suicidal ideation. Thought content does not include homicidal or suicidal plan.         Cognition and Memory: Cognition and memory normal.         Judgment: Judgment normal. Judgment is not impulsive.           Mental Status Exam:   Hygiene:   good  Cooperation:  Cooperative  Eye Contact:  Good  Psychomotor Behavior:  Appropriate  Affect:  Appropriate  Mood: normal  Hopelessness: Denies  Speech:  Normal  Thought Process:  Linear  Thought Content:  Mood congruent  Suicidal:  None  Homicidal:  None  Hallucinations:  Not demonstrated today  Delusion:  None  Memory:  Intact  Orientation:  Person, Place, Time and Situation  Reliability:  good  Insight:  Good  Judgement:  Good  Impulse Control:  Good  Physical/Medical Issues:  No           Lab Results:   No visits with results within 1 Month(s) from this visit.   Latest known visit with results is:   Office Visit on 01/12/2022   Component Date Value Ref Range Status   • Glucose 01/12/2022 95  65 - 99 mg/dL Final   • BUN 01/12/2022 9  6 - 20 mg/dL Final   • Creatinine 01/12/2022 1.01 (A) 0.57 - 1.00 mg/dL Final   • Sodium 01/12/2022 142  136 - 145 mmol/L Final   • Potassium 01/12/2022 4.2  3.5 - 5.2 mmol/L Final   • Chloride 01/12/2022 106  98 -  107 mmol/L Final   • CO2 01/12/2022 27.3  22.0 - 29.0 mmol/L Final   • Calcium 01/12/2022 9.2  8.6 - 10.5 mg/dL Final   • Total Protein 01/12/2022 7.5  6.0 - 8.5 g/dL Final   • Albumin 01/12/2022 4.23  3.50 - 5.20 g/dL Final   • ALT (SGPT) 01/12/2022 32  1 - 33 U/L Final   • AST (SGOT) 01/12/2022 20  1 - 32 U/L Final   • Alkaline Phosphatase 01/12/2022 82  39 - 117 U/L Final   • Total Bilirubin 01/12/2022 0.3  0.0 - 1.2 mg/dL Final   • eGFR Non  Amer 01/12/2022 67  >60 mL/min/1.73 Final   • Globulin 01/12/2022 3.3  gm/dL Final   • A/G Ratio 01/12/2022 1.3  g/dL Final   • BUN/Creatinine Ratio 01/12/2022 8.9  7.0 - 25.0 Final   • Anion Gap 01/12/2022 8.7  5.0 - 15.0 mmol/L Final   • Total Cholesterol 01/12/2022 145  0 - 200 mg/dL Final   • Triglycerides 01/12/2022 100  0 - 150 mg/dL Final   • HDL Cholesterol 01/12/2022 33 (A) 40 - 60 mg/dL Final   • LDL Cholesterol  01/12/2022 93  0 - 100 mg/dL Final   • VLDL Cholesterol 01/12/2022 19  5 - 40 mg/dL Final   • LDL/HDL Ratio 01/12/2022 2.79   Final   • TSH 01/12/2022 2.790  0.270 - 4.200 uIU/mL Final   • Color 01/12/2022 Dark Yellow  Yellow, Straw, Dark Yellow, Kizzy Final   • Clarity, UA 01/12/2022 Clear  Clear Final   • Glucose, UA 01/12/2022 Negative  Negative, 1000 mg/dL (3+) mg/dL Final   • Bilirubin 01/12/2022 Negative  Negative Final   • Ketones, UA 01/12/2022 Negative  Negative Final   • Specific Gravity  01/12/2022 1.030  1.005 - 1.030 Final   • Blood, UA 01/12/2022 3+ (A) Negative Final   • pH, Urine 01/12/2022 6.0  5.0 - 8.0 Final   • Protein, POC 01/12/2022 Trace (A) Negative mg/dL Final   • Urobilinogen, UA 01/12/2022 Normal  Normal Final   • Leukocytes 01/12/2022 Negative  Negative Final   • Nitrite, UA 01/12/2022 Positive (A) Negative Final   • WBC 01/12/2022 9.57  3.40 - 10.80 10*3/mm3 Final   • RBC 01/12/2022 5.31 (A) 3.77 - 5.28 10*6/mm3 Final   • Hemoglobin 01/12/2022 12.5  12.0 - 15.9 g/dL Final   • Hematocrit 01/12/2022 42.0  34.0 - 46.6 %  Final   • MCV 01/12/2022 79.1  79.0 - 97.0 fL Final   • MCH 01/12/2022 23.5 (A) 26.6 - 33.0 pg Final   • MCHC 01/12/2022 29.8 (A) 31.5 - 35.7 g/dL Final   • RDW 01/12/2022 16.5 (A) 12.3 - 15.4 % Final   • RDW-SD 01/12/2022 46.5  37.0 - 54.0 fl Final   • MPV 01/12/2022 10.8  6.0 - 12.0 fL Final   • Platelets 01/12/2022 347  140 - 450 10*3/mm3 Final   • Neutrophil % 01/12/2022 58.5  42.7 - 76.0 % Final   • Lymphocyte % 01/12/2022 31.3  19.6 - 45.3 % Final   • Monocyte % 01/12/2022 6.4  5.0 - 12.0 % Final   • Eosinophil % 01/12/2022 3.4  0.3 - 6.2 % Final   • Basophil % 01/12/2022 0.2  0.0 - 1.5 % Final   • Immature Grans % 01/12/2022 0.2  0.0 - 0.5 % Final   • Neutrophils, Absolute 01/12/2022 5.59  1.70 - 7.00 10*3/mm3 Final   • Lymphocytes, Absolute 01/12/2022 3.00  0.70 - 3.10 10*3/mm3 Final   • Monocytes, Absolute 01/12/2022 0.61  0.10 - 0.90 10*3/mm3 Final   • Eosinophils, Absolute 01/12/2022 0.33  0.00 - 0.40 10*3/mm3 Final   • Basophils, Absolute 01/12/2022 0.02  0.00 - 0.20 10*3/mm3 Final   • Immature Grans, Absolute 01/12/2022 0.02  0.00 - 0.05 10*3/mm3 Final   • nRBC 01/12/2022 0.0  0.0 - 0.2 /100 WBC Final         Assessment & Plan   Problems Addressed this Visit    None     Visit Diagnoses     Major depressive disorder, recurrent episode, moderate (HCC)  (Chronic)   -  Primary    Relevant Medications    ARIPiprazole (Abilify) 15 MG tablet    buPROPion XL (WELLBUTRIN XL) 150 MG 24 hr tablet    buPROPion XL (WELLBUTRIN XL) 300 MG 24 hr tablet    Vortioxetine HBr (Trintellix) 10 MG tablet tablet    Anxiety disorder, unspecified type  (Chronic)       Relevant Medications    ARIPiprazole (Abilify) 15 MG tablet    buPROPion XL (WELLBUTRIN XL) 150 MG 24 hr tablet    buPROPion XL (WELLBUTRIN XL) 300 MG 24 hr tablet    Vortioxetine HBr (Trintellix) 10 MG tablet tablet    Hallucinations        Relevant Medications    ARIPiprazole (Abilify) 15 MG tablet    Nightmares        Relevant Medications    ARIPiprazole  (Abilify) 15 MG tablet    buPROPion XL (WELLBUTRIN XL) 150 MG 24 hr tablet    buPROPion XL (WELLBUTRIN XL) 300 MG 24 hr tablet    prazosin (MINIPRESS) 2 MG capsule    Vortioxetine HBr (Trintellix) 10 MG tablet tablet    Post traumatic stress disorder (PTSD)  (Chronic)       Relevant Medications    ARIPiprazole (Abilify) 15 MG tablet    buPROPion XL (WELLBUTRIN XL) 150 MG 24 hr tablet    buPROPion XL (WELLBUTRIN XL) 300 MG 24 hr tablet    Vortioxetine HBr (Trintellix) 10 MG tablet tablet    History of borderline personality disorder        Sleeping difficulties          Diagnoses       Codes Comments    Major depressive disorder, recurrent episode, moderate (HCC)    -  Primary ICD-10-CM: F33.1  ICD-9-CM: 296.32     Anxiety disorder, unspecified type     ICD-10-CM: F41.9  ICD-9-CM: 300.00     Hallucinations     ICD-10-CM: R44.3  ICD-9-CM: 780.1     Nightmares     ICD-10-CM: F51.5  ICD-9-CM: 307.47     Post traumatic stress disorder (PTSD)     ICD-10-CM: F43.10  ICD-9-CM: 309.81     History of borderline personality disorder     ICD-10-CM: Z86.59  ICD-9-CM: V11.8     Sleeping difficulties     ICD-10-CM: G47.9  ICD-9-CM: 780.50           Visit Diagnoses:    ICD-10-CM ICD-9-CM   1. Major depressive disorder, recurrent episode, moderate (HCC)  F33.1 296.32   2. Anxiety disorder, unspecified type  F41.9 300.00   3. Hallucinations  R44.3 780.1   4. Nightmares  F51.5 307.47   5. Post traumatic stress disorder (PTSD)  F43.10 309.81   6. History of borderline personality disorder  Z86.59 V11.8   7. Sleeping difficulties  G47.9 780.50          GOALS:  Short Term Goals: Patient will be compliant with medication, and patient will have no significant medication related side effects.  Patient will be engaged in psychotherapy as indicated.  Patient will report subjective improvement of symptoms.  Long term goals: To stabilize mood and treat/improve subjective symptoms, the patient will stay out of the hospital, the patient will be  at an optimal level of functioning, and the patient will take all medications as prescribed.  The patient verbalized understanding and agreement with goals that were mutually set.      TREATMENT PLAN: Restart supportive psychotherapy efforts and TAKE  medications as indicated.  The patient declines restarting psychotherapy at today's encounter.  Medication and treatment options, both pharmacological and non-pharmacological treatment options, discussed during today's visit, including any off label use of medication. Patient acknowledged and verbally consented with current treatment plan and was educated on the importance of compliance with treatment and follow-up appointments.      - Continue Abilify 7.5 mg by mouth once daily for mood.  - Continue Wellbutrin  mg by mouth once daily for mood.  - Continue prazosin 2 mg by mouth once daily at bedtime for PTSD nightmares.  - Continue Trintellix 10 mg by mouth once daily for mood.      MEDICATION ISSUES:  Discussed medication options and treatment plan of prescribed medication, any off label use of medication, as well as the risks, benefits, any black box warnings including increased suicidality, and side effects including but not limited to potential falls, dizziness, possible impaired driving, GI side effects (change in appetite, abdominal discomfort, nausea, vomiting, diarrhea, and/or constipation), dry mouth, somnolence, sedation, insomnia, activation, agitation, irritation, tremors, abnormal muscle movements or disorders, tardive dyskinesia, akathisia, asthenia, headache, sweating, possible bruising or rare bleeding, electrolyte and/or fluid abnormalities, change in blood pressure/heart rate/and or heart rhythm, hypotension, sexual dysfunction, rare impulse control problems, rare seizures, rare neuroleptic malignant syndrome, increased risk of death and cerebrovascular events, change in blood glucose and increased risk for diabetes, change in triglycerides and  cholesterol and increased risk for dyslipidemia,  weight gain, weight gain that can become problematic to health, skin conditions and reactions, and metabolic adversities among others. Patient and/or guardian are agreeable to call the office with any worsening of symptoms or onset of side effects, or if any concerns or questions arise.  The contact information for the office is made available to the patient and/or guardian. Patient and/or guardian are agreeable to call 911 or go to the nearest ER should they begin having any SI/HI, or if any urgent concerns arise.      SUICIDE RISK ASSESSMENT AND SAFETY PLAN: Unalterable demographics and a history of mental health intervention indicate this patient is in a high risk category compared to the general population. At present, the patient denies active SI/HI, intentions, or plans at this time and agrees to seek immediate care should such thoughts develop. The patient verbalizes understanding of how to access emergency care if needed and agrees to do so. Consideration of suicide risk and protective factors such as history, current presentation, individual strengths and weaknesses, psychosocial and environmental stressors and variables, psychiatric illness and symptoms, medical conditions and pain, took place in this interview. Based on those considerations, the patient is determined: within individual baseline and presenting no imminent risk for suicide or homicide. Other recommendations: The patient does not meet the criteria for inpatient admission and is not a safety risk to self or others at today's visit. Inpatient treatment offers no significant advantages over outpatient treatment for this patient at today's visit.  The patient was given ample time for questions and fully participated in treatment planning.  The patient was encouraged to call the clinic with any questions or concerns.  The patient was informed of access to emergency care. If patient were to develop  any significant symptomatology, suicidal ideation, homicidal ideation, any concerns, or feel unsafe at any time they are to call the clinic and if unable to get immediate assistance should immediately call 911 or go to the nearest emergency room.  Patient contracted verbally for the following: If you are experiencing an emotional crisis or have thoughts of harming yourself or others, please go to your nearest local emergency room or call 911. Will continue to re-assess medication response and side effects frequently to establish efficacy and ensure safety. Risks, any black box warnings, side effects, off label usage, and benefits of medication and treatment discussed with patient, along with potential adverse side effects of current and/or newly prescribed medication, alternative treatment options, and OTC medications.  Patient verbalized understanding of potential risks, any off label use of medication, any black box warnings, and any side effects in their own words. The patient verbalized understanding and agreed to comply with the safety plan discussed in their own words.  Patient given the number to the office. Number also discussed of the 24- hour suicide hotline.         MEDS ORDERED DURING VISIT:  New Medications Ordered This Visit   Medications   • ARIPiprazole (Abilify) 15 MG tablet     Sig: Take 0.5 tablets by mouth Daily.     Dispense:  15 tablet     Refill:  0   • buPROPion XL (WELLBUTRIN XL) 150 MG 24 hr tablet     Sig: Take 1 tablet by mouth Every Morning.     Dispense:  30 tablet     Refill:  0   • buPROPion XL (WELLBUTRIN XL) 300 MG 24 hr tablet     Sig: Take 1 tablet by mouth Every Morning.     Dispense:  30 tablet     Refill:  0   • prazosin (MINIPRESS) 2 MG capsule     Sig: Take 1 capsule by mouth Every Night.     Dispense:  30 capsule     Refill:  0   • Vortioxetine HBr (Trintellix) 10 MG tablet tablet     Sig: Take 10 mg by mouth Daily With Breakfast.     Dispense:  30 tablet     Refill:  0        Return in about 4 weeks (around 10/24/2022), or if symptoms worsen or fail to improve, for Next scheduled follow up and Recheck.         Functional Status: Moderate impairment     Prognosis: Fair with Ongoing Treatment             This document has been electronically signed by ELISE Dye  September 26, 2022 10:39 EDT    Some of the data in this electronic note has been brought forward from a previous encounter, any necessary changes have been made, it has been reviewed by this APRN, and it is accurate.    Please note that portions of this note were completed with a voice recognition program.

## 2022-12-28 ENCOUNTER — TELEMEDICINE (OUTPATIENT)
Dept: PSYCHIATRY | Facility: CLINIC | Age: 27
End: 2022-12-28

## 2022-12-28 DIAGNOSIS — F41.9 ANXIETY DISORDER, UNSPECIFIED TYPE: Chronic | ICD-10-CM

## 2022-12-28 DIAGNOSIS — F33.1 MAJOR DEPRESSIVE DISORDER, RECURRENT EPISODE, MODERATE: Primary | Chronic | ICD-10-CM

## 2022-12-28 DIAGNOSIS — F51.5 NIGHTMARES: ICD-10-CM

## 2022-12-28 PROCEDURE — 99214 OFFICE O/P EST MOD 30 MIN: CPT | Performed by: NURSE PRACTITIONER

## 2022-12-28 RX ORDER — PRAZOSIN HYDROCHLORIDE 2 MG/1
2 CAPSULE ORAL NIGHTLY
Qty: 30 CAPSULE | Refills: 1 | Status: SHIPPED | OUTPATIENT
Start: 2022-12-28 | End: 2023-03-15 | Stop reason: SDUPTHER

## 2022-12-28 RX ORDER — ARIPIPRAZOLE 15 MG/1
7.5 TABLET ORAL DAILY
Qty: 15 TABLET | Refills: 1 | Status: SHIPPED | OUTPATIENT
Start: 2022-12-28 | End: 2023-03-06

## 2022-12-28 RX ORDER — BUPROPION HYDROCHLORIDE 150 MG/1
150 TABLET ORAL EVERY MORNING
Qty: 30 TABLET | Refills: 1 | Status: SHIPPED | OUTPATIENT
Start: 2022-12-28 | End: 2023-03-06

## 2022-12-28 RX ORDER — BUPROPION HYDROCHLORIDE 300 MG/1
300 TABLET ORAL EVERY MORNING
Qty: 30 TABLET | Refills: 1 | Status: SHIPPED | OUTPATIENT
Start: 2022-12-28 | End: 2023-03-15 | Stop reason: SDUPTHER

## 2022-12-28 NOTE — PROGRESS NOTES
This provider is located at the [facility] (through [facility]), [address], [address] using a secure [employer] Video Visit through [employer]. Patient is being seen remotely via telehealth at their home address in [place], and stated they are in a secure environment for this session. The patient's condition being diagnosed/treated is appropriate for telemedicine. The provider identified herself as well as her credentials.   The patient, and/or patients guardian, consent to be seen remotely, and when consent is given they understand that the consent allows for patient identifiable information to be sent to a third party as needed.   They may refuse to be seen remotely at any time. The electronic data is encrypted and password protected, and the patient and/or guardian has been advised of the potential risks to privacy not withstanding such measures.  Patient identifiers utilized: Name and date of birth.    You have chosen to receive care through a telehealth visit.  Do you consent to use a video/audio connection for your medical care today? Yes    Patient confirmed consent for today's encounter on 12/28/22.    Subjective   Kamryn Gross is a 27 y.o. female who presents today for follow up    Chief Complaint: Medication management follow-up - Depression, anxiety, history of PTSD, history of nightmares, and history of hallucinations follow-up    Accompanied by: The patient reports she is alone at today's encounter    History of Present Illness:   The patient describes her mood as good since her last encounter with this APRN.  The patient states she is still living in [place], [place] with her boyfriend and his grandparents, and things have been going good lately.  She reports she works full time at [employer], and likes her job.  The patient rates her symptoms of depression at a 4/10 on a 0-10 scale, with 10 being the worst.  The patient rates her symptoms of  anxiety at a 6-7/10 on a 0-10 scale, with 10 being the worst.  The patient reports the only reason her anxiety levels have been a little higher are due to dealing with managing her new job and working at [employer], which she reports she does like.  She reports her and her boyfriend are saving up for their own place.  The patient reports her appetite as good.  The patient reports her sleep as good.  The patient reports averaging 8-9 hours of sleep each night.  The patient denies any nightmares.  The patient denies any new medical problems or changes in medications since last appointment with this facility.  The patient reports compliance with her current medication regimen, but reports she did run out of all medications a couple of days ago.  The patient denies any side effects, rashes, or concerns from her current medication regimen.  The patient denies any abnormal musculoskeletal movements or symptoms of EPS or TD.  The patient reports she would like to not adjust or change her medications at this visit as she feels she is doing good at this time on her current regimen and remaining stable.  The patient denies any suicidal or homicidal ideations, plans, or intent at today's encounter and is convincing.  The patient denies any auditory hallucinations or visual hallucinations, and denies any recent AVH.  The patient does not endorse any significant symptoms consistent with matt or psychosis at today's encounter.      Prior Psychiatric Medications:  Prozac  Zoloft  Trileptal  Paxil  Hydroxyzine  Desvenlafaxine/Pristiq  Prazosin  Wellbutrin XL  Abilify  Trintellix      Last Menstrual Period:  Less than one month ago.  The patient was educated that her prescribed medications can have potential risk to a developing fetus. The patient is advised to contact this APRN/this office if she becomes pregnant or plans to become pregnant.  Pt verbalizes understanding and acknowledged agreement with this plan in her own  words.        The following portions of the patient's history were reviewed and updated as appropriate: allergies, current medications, past family history, past medical history, past social history, past surgical history and problem list.          Past Medical History:  Past Medical History:   Diagnosis Date   • Anxiety    • Bipolar disorder (HCC)    • Depression    • Headache    • Obesity    • Panic disorder    • PTSD (post-traumatic stress disorder)    • Self-injurious behavior    • Sleep apnea    • Suicide attempt (HCC)     attempted overdose in 2017       Social History:  Social History     Socioeconomic History   • Marital status: Single   Tobacco Use   • Smoking status: Every Day     Packs/day: 0.50     Years: 8.00     Pack years: 4.00     Types: Cigarettes     Start date: 2014   • Smokeless tobacco: Never   Vaping Use   • Vaping Use: Never used   Substance and Sexual Activity   • Alcohol use: Not Currently   • Drug use: Not Currently     Types: Marijuana   • Sexual activity: Yes     Partners: Male     Birth control/protection: None       Family History:  Family History   Problem Relation Age of Onset   • Diabetes Mother    • Anxiety disorder Mother    • Depression Mother    • Anxiety disorder Father    • Depression Father    • Anxiety disorder Sister    • Depression Sister    • Depression Maternal Aunt    • Heart attack Paternal Aunt    • Anxiety disorder Paternal Aunt    • Depression Paternal Aunt    • No Known Problems Maternal Uncle    • No Known Problems Paternal Uncle    • Lung disease Maternal Grandfather    • Alzheimer's disease Maternal Grandmother    • Lung cancer Paternal Grandfather    • Alzheimer's disease Paternal Grandmother    • Alcohol abuse Paternal Grandmother    • Anxiety disorder Sister    • Depression Sister    • Anxiety disorder Paternal Aunt    • Depression Paternal Aunt    • No Known Problems Maternal Uncle    • No Known Problems Paternal Uncle    • Heart attack Paternal Uncle    •  Other Paternal Uncle        Past Surgical History:  Past Surgical History:   Procedure Laterality Date   • TONSILLECTOMY         Problem List:  Patient Active Problem List   Diagnosis   • Major depressive disorder, recurrent, moderate (HCC)   • Class 3 severe obesity without serious comorbidity with body mass index (BMI) of 50.0 to 59.9 in adult (HCC)   • Smoker   • Migraine without aura and without status migrainosus, not intractable   • Generalized anxiety disorder   • History of sleep apnea   • PTSD (post-traumatic stress disorder)   • Sleep apnea       Allergy:   No Known Allergies     Current Medications:   Current Outpatient Medications   Medication Sig Dispense Refill   • ARIPiprazole (Abilify) 15 MG tablet Take 0.5 tablets by mouth Daily. 15 tablet 1   • buPROPion XL (WELLBUTRIN XL) 150 MG 24 hr tablet Take 1 tablet by mouth Every Morning. 30 tablet 1   • buPROPion XL (WELLBUTRIN XL) 300 MG 24 hr tablet Take 1 tablet by mouth Every Morning. 30 tablet 1   • prazosin (MINIPRESS) 2 MG capsule Take 1 capsule by mouth Every Night. 30 capsule 1   • Vortioxetine HBr (Trintellix) 10 MG tablet tablet Take 1 tablet by mouth Daily With Breakfast. 30 tablet 1   • SUMAtriptan (Imitrex) 25 MG tablet Take one tablet at onset of headache. May repeat dose one time in 2 hours if headache not relieved. 20 tablet 5     No current facility-administered medications for this visit.       Review of Symptoms:    Review of Systems   Psychiatric/Behavioral: Positive for depressed mood and stress. Negative for agitation, behavioral problems, decreased concentration, dysphoric mood, hallucinations, self-injury, sleep disturbance, suicidal ideas and negative for hyperactivity. The patient is nervous/anxious.          Physical Exam:   There were no vitals taken for this visit. There is no height or weight on file to calculate BMI.   Due to the remote nature of this encounter (virtual encounter), vitals were unable to be obtained.  Height  stated at 69 inches.  Weight stated at around 380 pounds.      Physical Exam  Neurological:      Mental Status: She is alert and oriented to person, place, and time.   Psychiatric:         Attention and Perception: Attention normal.         Mood and Affect: Affect normal. Mood is anxious and depressed.         Speech: Speech normal.         Behavior: Behavior normal. Behavior is cooperative.         Thought Content: Thought content normal. Thought content is not paranoid or delusional. Thought content does not include homicidal or suicidal ideation. Thought content does not include homicidal or suicidal plan.         Cognition and Memory: Cognition and memory normal.         Judgment: Judgment normal. Judgment is not impulsive.           Mental Status Exam:   Hygiene:   good  Cooperation:  Cooperative  Eye Contact:  Good  Psychomotor Behavior:  Appropriate  Affect:  Appropriate  Mood: depressed and anxious  Hopelessness: Denies  Speech:  Normal  Thought Process:  Linear  Thought Content:  Mood congruent  Suicidal:  None  Homicidal:  None  Hallucinations:  Not demonstrated today  Delusion:  None  Memory:  Intact  Orientation:  Person, Place, Time and Situation  Reliability:  good  Insight:  Good  Judgement:  Good  Impulse Control:  Good  Physical/Medical Issues:  No           Lab Results:   No visits with results within 1 Month(s) from this visit.   Latest known visit with results is:   Office Visit on 01/12/2022   Component Date Value Ref Range Status   • Glucose 01/12/2022 95  65 - 99 mg/dL Final   • BUN 01/12/2022 9  6 - 20 mg/dL Final   • Creatinine 01/12/2022 1.01 (H)  0.57 - 1.00 mg/dL Final   • Sodium 01/12/2022 142  136 - 145 mmol/L Final   • Potassium 01/12/2022 4.2  3.5 - 5.2 mmol/L Final   • Chloride 01/12/2022 106  98 - 107 mmol/L Final   • CO2 01/12/2022 27.3  22.0 - 29.0 mmol/L Final   • Calcium 01/12/2022 9.2  8.6 - 10.5 mg/dL Final   • Total Protein 01/12/2022 7.5  6.0 - 8.5 g/dL Final   • Albumin  01/12/2022 4.23  3.50 - 5.20 g/dL Final   • ALT (SGPT) 01/12/2022 32  1 - 33 U/L Final   • AST (SGOT) 01/12/2022 20  1 - 32 U/L Final   • Alkaline Phosphatase 01/12/2022 82  39 - 117 U/L Final   • Total Bilirubin 01/12/2022 0.3  0.0 - 1.2 mg/dL Final   • eGFR Non  Amer 01/12/2022 67  >60 mL/min/1.73 Final   • Globulin 01/12/2022 3.3  gm/dL Final   • A/G Ratio 01/12/2022 1.3  g/dL Final   • BUN/Creatinine Ratio 01/12/2022 8.9  7.0 - 25.0 Final   • Anion Gap 01/12/2022 8.7  5.0 - 15.0 mmol/L Final   • Total Cholesterol 01/12/2022 145  0 - 200 mg/dL Final   • Triglycerides 01/12/2022 100  0 - 150 mg/dL Final   • HDL Cholesterol 01/12/2022 33 (L)  40 - 60 mg/dL Final   • LDL Cholesterol  01/12/2022 93  0 - 100 mg/dL Final   • VLDL Cholesterol 01/12/2022 19  5 - 40 mg/dL Final   • LDL/HDL Ratio 01/12/2022 2.79   Final   • TSH 01/12/2022 2.790  0.270 - 4.200 uIU/mL Final   • Color 01/12/2022 Dark Yellow  Yellow, Straw, Dark Yellow, Kizzy Final   • Clarity, UA 01/12/2022 Clear  Clear Final   • Glucose, UA 01/12/2022 Negative  Negative, 1000 mg/dL (3+) mg/dL Final   • Bilirubin 01/12/2022 Negative  Negative Final   • Ketones, UA 01/12/2022 Negative  Negative Final   • Specific Gravity  01/12/2022 1.030  1.005 - 1.030 Final   • Blood, UA 01/12/2022 3+ (A)  Negative Final   • pH, Urine 01/12/2022 6.0  5.0 - 8.0 Final   • Protein, POC 01/12/2022 Trace (A)  Negative mg/dL Final   • Urobilinogen, UA 01/12/2022 Normal  Normal Final   • Leukocytes 01/12/2022 Negative  Negative Final   • Nitrite, UA 01/12/2022 Positive (A)  Negative Final   • WBC 01/12/2022 9.57  3.40 - 10.80 10*3/mm3 Final   • RBC 01/12/2022 5.31 (H)  3.77 - 5.28 10*6/mm3 Final   • Hemoglobin 01/12/2022 12.5  12.0 - 15.9 g/dL Final   • Hematocrit 01/12/2022 42.0  34.0 - 46.6 % Final   • MCV 01/12/2022 79.1  79.0 - 97.0 fL Final   • MCH 01/12/2022 23.5 (L)  26.6 - 33.0 pg Final   • MCHC 01/12/2022 29.8 (L)  31.5 - 35.7 g/dL Final   • RDW 01/12/2022 16.5 (H)   12.3 - 15.4 % Final   • RDW-SD 01/12/2022 46.5  37.0 - 54.0 fl Final   • MPV 01/12/2022 10.8  6.0 - 12.0 fL Final   • Platelets 01/12/2022 347  140 - 450 10*3/mm3 Final   • Neutrophil % 01/12/2022 58.5  42.7 - 76.0 % Final   • Lymphocyte % 01/12/2022 31.3  19.6 - 45.3 % Final   • Monocyte % 01/12/2022 6.4  5.0 - 12.0 % Final   • Eosinophil % 01/12/2022 3.4  0.3 - 6.2 % Final   • Basophil % 01/12/2022 0.2  0.0 - 1.5 % Final   • Immature Grans % 01/12/2022 0.2  0.0 - 0.5 % Final   • Neutrophils, Absolute 01/12/2022 5.59  1.70 - 7.00 10*3/mm3 Final   • Lymphocytes, Absolute 01/12/2022 3.00  0.70 - 3.10 10*3/mm3 Final   • Monocytes, Absolute 01/12/2022 0.61  0.10 - 0.90 10*3/mm3 Final   • Eosinophils, Absolute 01/12/2022 0.33  0.00 - 0.40 10*3/mm3 Final   • Basophils, Absolute 01/12/2022 0.02  0.00 - 0.20 10*3/mm3 Final   • Immature Grans, Absolute 01/12/2022 0.02  0.00 - 0.05 10*3/mm3 Final   • nRBC 01/12/2022 0.0  0.0 - 0.2 /100 WBC Final         Assessment & Plan   Problems Addressed this Visit    None  Visit Diagnoses     Major depressive disorder, recurrent episode, moderate (HCC)  (Chronic)   -  Primary    R/O MDD R/O mood disorder    Relevant Medications    ARIPiprazole (Abilify) 15 MG tablet    buPROPion XL (WELLBUTRIN XL) 150 MG 24 hr tablet    buPROPion XL (WELLBUTRIN XL) 300 MG 24 hr tablet    Vortioxetine HBr (Trintellix) 10 MG tablet tablet    Anxiety disorder, unspecified type  (Chronic)       Relevant Medications    ARIPiprazole (Abilify) 15 MG tablet    buPROPion XL (WELLBUTRIN XL) 150 MG 24 hr tablet    buPROPion XL (WELLBUTRIN XL) 300 MG 24 hr tablet    Vortioxetine HBr (Trintellix) 10 MG tablet tablet    Nightmares        Relevant Medications    ARIPiprazole (Abilify) 15 MG tablet    buPROPion XL (WELLBUTRIN XL) 150 MG 24 hr tablet    buPROPion XL (WELLBUTRIN XL) 300 MG 24 hr tablet    prazosin (MINIPRESS) 2 MG capsule    Vortioxetine HBr (Trintellix) 10 MG tablet tablet      Diagnoses       Codes  Comments    Major depressive disorder, recurrent episode, moderate (HCC)    -  Primary ICD-10-CM: F33.1  ICD-9-CM: 296.32 R/O MDD R/O mood disorder    Anxiety disorder, unspecified type     ICD-10-CM: F41.9  ICD-9-CM: 300.00     Nightmares     ICD-10-CM: F51.5  ICD-9-CM: 307.47           Visit Diagnoses:    ICD-10-CM ICD-9-CM   1. Major depressive disorder, recurrent episode, moderate (HCC)  F33.1 296.32   2. Anxiety disorder, unspecified type  F41.9 300.00   3. Nightmares  F51.5 307.47          GOALS:  Short Term Goals: Patient will be compliant with medication, and patient will have no significant medication related side effects.  Patient will be engaged in psychotherapy as indicated.  Patient will report subjective improvement of symptoms.  Long term goals: To stabilize mood and treat/improve subjective symptoms, the patient will stay out of the hospital, the patient will be at an optimal level of functioning, and the patient will take all medications as prescribed.  The patient verbalized understanding and agreement with goals that were mutually set.      TREATMENT PLAN: Restart supportive psychotherapy efforts and take medications as indicated.  The patient declines restarting psychotherapy at today's encounter.  Medication and treatment options, both pharmacological and non-pharmacological treatment options, discussed during today's visit, including any off label use of medication. Patient acknowledged and verbally consented with current treatment plan and was educated on the importance of compliance with treatment and follow-up appointments.      - Continue Abilify 7.5 mg by mouth once daily for mood.  - Continue Wellbutrin  mg by mouth once daily for mood.  - Continue prazosin 2 mg by mouth once daily at bedtime for PTSD nightmares.  - Continue Trintellix 10 mg by mouth once daily for mood.      MEDICATION ISSUES:  Discussed medication options and treatment plan of prescribed medication, any off label  use of medication, as well as the risks, benefits, any black box warnings including increased suicidality, and side effects including but not limited to potential falls, dizziness, possible impaired driving, GI side effects (change in appetite, abdominal discomfort, nausea, vomiting, diarrhea, and/or constipation), dry mouth, somnolence, sedation, insomnia, activation, agitation, irritation, tremors, abnormal muscle movements or disorders, tardive dyskinesia, akathisia, asthenia, headache, sweating, possible bruising or rare bleeding, electrolyte and/or fluid abnormalities, change in blood pressure/heart rate/and or heart rhythm, hypotension, sexual dysfunction, rare impulse control problems, rare seizures, rare neuroleptic malignant syndrome, increased risk of death and cerebrovascular events, change in blood glucose and increased risk for diabetes, change in triglycerides and cholesterol and increased risk for dyslipidemia,  weight gain, weight gain that can become problematic to health, skin conditions and reactions, and metabolic adversities among others. Patient and/or guardian are agreeable to call the office with any worsening of symptoms or onset of side effects, or if any concerns or questions arise.  The contact information for the office is made available to the patient and/or guardian. Patient and/or guardian are agreeable to call 911 or go to the nearest ER should they begin having any SI/HI, or if any urgent concerns arise.      Verbal Informed Consent for Medication:  The patient was educated that their proposed/prescribed psychotropic medication(s) has potential risks, side effects, adverse effects, and black box warnings; and these have been discussed with the patient.  The patient has been informed that their treatment and medication dosage is to be individualized, and may even be above or below the recommended range/dosage due to patient individualization and response, but medication is prescribed  using a shared decision making approach, and no medication or dosage will be prescribed without the patient's verbal consent.  The reason for the use of the medication including any off label use and alternative modes of treatment other than or in addition to medication has been considered and discussed, the probable consequences of not receiving the proposed treatment have been discussed, and any treatment side effects, black box warnings, and cautions associated with treatment have been discussed with the patient.  The patient is allowed ample time to openly discuss and ask questions regarding the proposed medication(s) and treatment plan and the patient verbalizes understanding the reasons for the use of the medication, its potential risks and benefits, other alternative treatment(s), and the probable consequences that may occur if the proposed medication is not given.  The patient has been given ample time to ask questions and study the information and find the information to be specific, accurate, and complete.  The patient gives verbal consent for the medication(s) proposed/prescribed, they verbalized understanding that they can refuse and withdraw consent at any time with the assistance of this APRN, and the patient has verbally confirmed that they are aware, and are willing, to take the prescribed medication and follow the treatment plan with the known possible risks, side effect, black box warnings, and any potential medication interactions, and the patient reports they will be worse off without this medication and treatment plan.  The patient is advised to contact this APRN/this office if any questions or concerns arise at any time (at [phone]), or call 911/go to the closest emergency department if needed or outside of office hours.      SUICIDE RISK ASSESSMENT AND SAFETY PLAN: Unalterable demographics and a history of mental health intervention indicate this patient is in a high risk category  compared to the general population. At present, the patient denies active SI/HI, intentions, or plans at this time and agrees to seek immediate care should such thoughts develop. The patient verbalizes understanding of how to access emergency care if needed and agrees to do so. Consideration of suicide risk and protective factors such as history, current presentation, individual strengths and weaknesses, psychosocial and environmental stressors and variables, psychiatric illness and symptoms, medical conditions and pain, took place in this interview. Based on those considerations, the patient is determined: within individual baseline and presenting no imminent risk for suicide or homicide. Other recommendations: The patient does not meet the criteria for inpatient admission and is not a safety risk to self or others at today's visit. Inpatient treatment offers no significant advantages over outpatient treatment for this patient at today's visit.  The patient was given ample time for questions and fully participated in treatment planning.  The patient was encouraged to call the clinic with any questions or concerns.  The patient was informed of access to emergency care. If patient were to develop any significant symptomatology, suicidal ideation, homicidal ideation, any concerns, or feel unsafe at any time they are to call the clinic and if unable to get immediate assistance should immediately call 911 or go to the nearest emergency room.  Patient contracted verbally for the following: If you are experiencing an emotional crisis or have thoughts of harming yourself or others, please go to your nearest local emergency room or call 911. Will continue to re-assess medication response and side effects frequently to establish efficacy and ensure safety. Risks, any black box warnings, side effects, off label usage, and benefits of medication and treatment discussed with patient, along with potential adverse side effects of  current and/or newly prescribed medication, alternative treatment options, and OTC medications.  Patient verbalized understanding of potential risks, any off label use of medication, any black box warnings, and any side effects in their own words. The patient verbalized understanding and agreed to comply with the safety plan discussed in their own words.  Patient given the number to the office. Number also discussed of the 24- hour suicide hotline.         MEDS ORDERED DURING VISIT:  New Medications Ordered This Visit   Medications   • ARIPiprazole (Abilify) 15 MG tablet     Sig: Take 0.5 tablets by mouth Daily.     Dispense:  15 tablet     Refill:  1   • buPROPion XL (WELLBUTRIN XL) 150 MG 24 hr tablet     Sig: Take 1 tablet by mouth Every Morning.     Dispense:  30 tablet     Refill:  1   • buPROPion XL (WELLBUTRIN XL) 300 MG 24 hr tablet     Sig: Take 1 tablet by mouth Every Morning.     Dispense:  30 tablet     Refill:  1   • prazosin (MINIPRESS) 2 MG capsule     Sig: Take 1 capsule by mouth Every Night.     Dispense:  30 capsule     Refill:  1   • Vortioxetine HBr (Trintellix) 10 MG tablet tablet     Sig: Take 1 tablet by mouth Daily With Breakfast.     Dispense:  30 tablet     Refill:  1       Return in about 8 weeks (around 2/22/2023), or if symptoms worsen or fail to improve, for Next scheduled follow up and Recheck.         Functional Status: Moderate impairment     Prognosis: Fair with Ongoing Treatment             This document has been electronically signed by ELISE Dye  December 28, 2022 08:31 EST    Some of the data in this electronic note has been brought forward from a previous encounter, any necessary changes have been made, it has been reviewed by this APRN, and it is accurate.    Please note that portions of this note were completed with a voice recognition program.

## 2023-03-03 DIAGNOSIS — F41.9 ANXIETY DISORDER, UNSPECIFIED TYPE: Chronic | ICD-10-CM

## 2023-03-03 DIAGNOSIS — F33.1 MAJOR DEPRESSIVE DISORDER, RECURRENT EPISODE, MODERATE: Chronic | ICD-10-CM

## 2023-03-06 RX ORDER — VORTIOXETINE 10 MG/1
TABLET, FILM COATED ORAL
Qty: 30 TABLET | Refills: 0 | Status: SHIPPED | OUTPATIENT
Start: 2023-03-06 | End: 2023-03-15 | Stop reason: DRUGHIGH

## 2023-03-06 RX ORDER — ARIPIPRAZOLE 15 MG/1
TABLET ORAL
Qty: 15 TABLET | Refills: 0 | Status: SHIPPED | OUTPATIENT
Start: 2023-03-06 | End: 2023-03-15 | Stop reason: SDUPTHER

## 2023-03-06 RX ORDER — BUPROPION HYDROCHLORIDE 150 MG/1
150 TABLET ORAL EVERY MORNING
Qty: 30 TABLET | Refills: 0 | Status: SHIPPED | OUTPATIENT
Start: 2023-03-06 | End: 2023-03-15 | Stop reason: SDUPTHER

## 2023-03-15 ENCOUNTER — TELEMEDICINE (OUTPATIENT)
Dept: PSYCHIATRY | Facility: CLINIC | Age: 28
End: 2023-03-15
Payer: COMMERCIAL

## 2023-03-15 DIAGNOSIS — Z86.59 HISTORY OF BORDERLINE PERSONALITY DISORDER: ICD-10-CM

## 2023-03-15 DIAGNOSIS — F41.9 ANXIETY DISORDER, UNSPECIFIED TYPE: Chronic | ICD-10-CM

## 2023-03-15 DIAGNOSIS — F51.5 NIGHTMARES: ICD-10-CM

## 2023-03-15 DIAGNOSIS — F33.2 SEVERE EPISODE OF RECURRENT MAJOR DEPRESSIVE DISORDER, WITHOUT PSYCHOTIC FEATURES: Primary | Chronic | ICD-10-CM

## 2023-03-15 PROCEDURE — 99214 OFFICE O/P EST MOD 30 MIN: CPT | Performed by: NURSE PRACTITIONER

## 2023-03-15 PROCEDURE — 1160F RVW MEDS BY RX/DR IN RCRD: CPT | Performed by: NURSE PRACTITIONER

## 2023-03-15 PROCEDURE — 1159F MED LIST DOCD IN RCRD: CPT | Performed by: NURSE PRACTITIONER

## 2023-03-15 RX ORDER — ARIPIPRAZOLE 15 MG/1
7.5 TABLET ORAL DAILY
Qty: 15 TABLET | Refills: 0 | Status: SHIPPED | OUTPATIENT
Start: 2023-03-15

## 2023-03-15 RX ORDER — PRAZOSIN HYDROCHLORIDE 2 MG/1
2 CAPSULE ORAL NIGHTLY
Qty: 30 CAPSULE | Refills: 0 | Status: SHIPPED | OUTPATIENT
Start: 2023-03-15

## 2023-03-15 RX ORDER — BUPROPION HYDROCHLORIDE 150 MG/1
150 TABLET ORAL EVERY MORNING
Qty: 30 TABLET | Refills: 0 | Status: SHIPPED | OUTPATIENT
Start: 2023-03-15

## 2023-03-15 RX ORDER — BUPROPION HYDROCHLORIDE 300 MG/1
300 TABLET ORAL EVERY MORNING
Qty: 30 TABLET | Refills: 0 | Status: SHIPPED | OUTPATIENT
Start: 2023-03-15

## 2023-03-15 NOTE — PROGRESS NOTES
"This provider is located at the [facility] (through [facility]), [address], [facility], [address] using a secure [employer] Video Visit through [employer]. Patient is being seen remotely via telehealth at their home address in [place], and stated they are in a secure environment for this session. The patient's condition being diagnosed/treated is appropriate for telemedicine. The provider identified herself as well as her credentials.   The patient, and/or patients guardian, consent to be seen remotely, and when consent is given they understand that the consent allows for patient identifiable information to be sent to a third party as needed.   They may refuse to be seen remotely at any time. The electronic data is encrypted and password protected, and the patient and/or guardian has been advised of the potential risks to privacy not withstanding such measures.    You have chosen to receive care through a telehealth visit.  Do you consent to use a video/audio connection for your medical care today? Yes    Patient identifiers utilized: Name and date of birth.    Patient verbally confirmed consent for today's encounter 3/15/23.    Subjective   Kamryn Gross is a 27 y.o. female who presents today for follow up    Chief Complaint: Medication management follow-up - Depression, anxiety, history of PTSD, history of nightmares, and history of hallucinations follow-up    Accompanied by: The patient reports she is alone at today's encounter    History of Present Illness:   The patient describes her mood as \"alright\" since her last encounter with this APRN.  The patient reports her moods have been more \"all over the place\" since her last encounter, and feels that something needs adjusted with her medication.  The patient reports one minutes she feels \"alright\", and then the next minute she feels down and depressed, she reports that she wants to self isolate, and just wants to stay in the " bed.  The patient reports her and her boyfriend initially did break up and she moved back in with her mother.  The patient reports her and her boyfriend have gotten back together, and he has moved from the [facility] to her home, and they are hoping to find their own place in the surrounding area soon.  The patient rates her symptoms of depression at a 7/10 on a 0-10 scale, with 10 being the worst.  The patient rates her symptoms of anxiety at a 8/10 on a 0-10 scale, with 10 being the worst.  The patient reports her symptoms of anxiety as feeling restless and fidgety, not being able to sit still at times, and excessive worry.  She reports her depressive and anxious symptoms seem to be getting worse.  The patient reports her appetite as good.  The patient reports where she has been feeling more depressed she feels she is actually over eating.  The patient reports her sleep as good, and reports she feels she is sleeping too much.  The patient denies any nightmares.  The patient denies any new medical problems or changes in medications since last appointment with this facility.  The patient reports compliance with her current medication regimen.  The patient denies any side effects or concerns from her current medication regimen.  The patient does not report any symptoms of EPS or TD.  The patient denies any auditory hallucinations or visual hallucinations.  The patient reports she did have some non-suicidal self harming thoughts, but denies any self-injurious behaviors.  The patient reports those thoughts were unwanted and she was able to distract herself.  The patient does not endorse any significant symptoms consistent with matt or psychosis at today's encounter.  The patient denies any suicidal or homicidal ideations, plans, or intent at today's encounter and is convincing.  The patient reports she would like to adjust her current psychotropic medication regimen at today's encounter to help with  her reported worsened moods including depressive and anxious symptoms.      All Known Prior Psychiatric Medications:  -Prozac  -Zoloft  -Trileptal  -Paxil  -Hydroxyzine  -Desvenlafaxine/Pristiq  -Prazosin  -Wellbutrin XL  -Abilify  -Trintellix      Last Menstrual Period:  One week ago.  The patient was educated that her prescribed medications can have potential risk to a developing fetus. The patient is advised to contact this APRN/this office if she becomes pregnant or plans to become pregnant.  Pt verbalizes understanding and acknowledged agreement with this plan in her own words.        The following portions of the patient's history were reviewed and updated as appropriate: allergies, current medications, past family history, past medical history, past social history, past surgical history and problem list.          Past Medical History:  Past Medical History:   Diagnosis Date   • Anxiety    • Bipolar disorder (HCC)    • Depression    • Headache    • Obesity    • Panic disorder    • PTSD (post-traumatic stress disorder)    • Self-injurious behavior    • Sleep apnea    • Suicide attempt (HCC)     attempted overdose in 2017       Social History:  Social History     Socioeconomic History   • Marital status: Single   Tobacco Use   • Smoking status: Every Day     Packs/day: 0.50     Years: 8.00     Pack years: 4.00     Types: Cigarettes     Start date: 2014   • Smokeless tobacco: Never   Vaping Use   • Vaping Use: Never used   Substance and Sexual Activity   • Alcohol use: Not Currently   • Drug use: Not Currently     Types: Marijuana   • Sexual activity: Yes     Partners: Male     Birth control/protection: None       Family History:  Family History   Problem Relation Age of Onset   • Diabetes Mother    • Anxiety disorder Mother    • Depression Mother    • Anxiety disorder Father    • Depression Father    • Anxiety disorder Sister    • Depression Sister    • Depression Maternal Aunt    • Heart attack Paternal Aunt     • Anxiety disorder Paternal Aunt    • Depression Paternal Aunt    • No Known Problems Maternal Uncle    • No Known Problems Paternal Uncle    • Lung disease Maternal Grandfather    • Alzheimer's disease Maternal Grandmother    • Lung cancer Paternal Grandfather    • Alzheimer's disease Paternal Grandmother    • Alcohol abuse Paternal Grandmother    • Anxiety disorder Sister    • Depression Sister    • Anxiety disorder Paternal Aunt    • Depression Paternal Aunt    • No Known Problems Maternal Uncle    • No Known Problems Paternal Uncle    • Heart attack Paternal Uncle    • Other Paternal Uncle        Past Surgical History:  Past Surgical History:   Procedure Laterality Date   • TONSILLECTOMY         Problem List:  Patient Active Problem List   Diagnosis   • Major depressive disorder, recurrent, moderate (HCC)   • Class 3 severe obesity without serious comorbidity with body mass index (BMI) of 50.0 to 59.9 in adult ([facility])   • Smoker   • Migraine without aura and without status migrainosus, not intractable   • Generalized anxiety disorder   • History of sleep apnea   • PTSD (post-traumatic stress disorder)   • Sleep apnea       Allergy:   No Known Allergies     Current Medications:   Current Outpatient Medications   Medication Sig Dispense Refill   • ARIPiprazole (ABILIFY) 15 MG tablet Take 0.5 tablets by mouth Daily. 15 tablet 0   • buPROPion XL (WELLBUTRIN XL) 150 MG 24 hr tablet Take 1 tablet by mouth Every Morning. 30 tablet 0   • buPROPion XL (WELLBUTRIN XL) 300 MG 24 hr tablet Take 1 tablet by mouth Every Morning. 30 tablet 0   • prazosin (MINIPRESS) 2 MG capsule Take 1 capsule by mouth Every Night. 30 capsule 0   • SUMAtriptan (Imitrex) 25 MG tablet Take one tablet at onset of headache. May repeat dose one time in 2 hours if headache not relieved. 20 tablet 5   • Vortioxetine HBr (Trintellix) 5 MG tablet tablet Take 3 tablets by mouth Daily With Breakfast. 90 tablet 0     No current facility-administered  medications for this visit.       Review of Symptoms:    Review of Systems   Psychiatric/Behavioral: Positive for decreased concentration, depressed mood and stress. Negative for agitation, dysphoric mood, hallucinations, self-injury, suicidal ideas and negative for hyperactivity. The patient is nervous/anxious.          Physical Exam:   There were no vitals taken for this visit. There is no height or weight on file to calculate BMI.   Due to the remote nature of this encounter (virtual encounter), vitals were unable to be obtained.  Height stated at 69 inches.  Weight stated at around 380 pounds.      Physical Exam  Neurological:      Mental Status: She is alert and oriented to person, place, and time.   Psychiatric:         Attention and Perception: Attention normal.         Mood and Affect: Mood is anxious and depressed. Affect is blunt.         Speech: Speech normal.         Behavior: Behavior normal. Behavior is cooperative.         Thought Content: Thought content normal. Thought content is not paranoid or delusional. Thought content does not include homicidal or suicidal ideation. Thought content does not include homicidal or suicidal plan.         Cognition and Memory: Cognition and memory normal.         Judgment: Judgment normal.           Mental Status Exam:   Hygiene:   good  Cooperation:  Cooperative  Eye Contact:  Good  Psychomotor Behavior:  Appropriate  Affect:  Blunted  Mood: depressed and anxious  Hopelessness: Denies  Speech:  Normal  Thought Process:  Linear  Thought Content:  Mood congruent  Suicidal:  None  Homicidal:  None  Hallucinations:  Not demonstrated today  Delusion:  None  Memory:  Intact  Orientation:  Person, Place, Time and Situation  Reliability:  good  Insight:  Good  Judgement:  Good  Impulse Control:  Good  Physical/Medical Issues:  No           Lab Results:   No visits with results within 1 Month(s) from this visit.   Latest known visit with results is:   Office Visit on  01/12/2022   Component Date Value Ref Range Status   • Glucose 01/12/2022 95  65 - 99 mg/dL Final   • BUN 01/12/2022 9  6 - 20 mg/dL Final   • Creatinine 01/12/2022 1.01 (H)  0.57 - 1.00 mg/dL Final   • Sodium 01/12/2022 142  136 - 145 mmol/L Final   • Potassium 01/12/2022 4.2  3.5 - 5.2 mmol/L Final   • Chloride 01/12/2022 106  98 - 107 mmol/L Final   • CO2 01/12/2022 27.3  22.0 - 29.0 mmol/L Final   • Calcium 01/12/2022 9.2  8.6 - 10.5 mg/dL Final   • Total Protein 01/12/2022 7.5  6.0 - 8.5 g/dL Final   • Albumin 01/12/2022 4.23  3.50 - 5.20 g/dL Final   • ALT (SGPT) 01/12/2022 32  1 - 33 U/L Final   • AST (SGOT) 01/12/2022 20  1 - 32 U/L Final   • Alkaline Phosphatase 01/12/2022 82  39 - 117 U/L Final   • Total Bilirubin 01/12/2022 0.3  0.0 - 1.2 mg/dL Final   • eGFR Non  Amer 01/12/2022 67  >60 mL/min/1.73 Final   • Globulin 01/12/2022 3.3  gm/dL Final   • A/G Ratio 01/12/2022 1.3  g/dL Final   • BUN/Creatinine Ratio 01/12/2022 8.9  7.0 - 25.0 Final   • Anion Gap 01/12/2022 8.7  5.0 - 15.0 mmol/L Final   • Total Cholesterol 01/12/2022 145  0 - 200 mg/dL Final   • Triglycerides 01/12/2022 100  0 - 150 mg/dL Final   • HDL Cholesterol 01/12/2022 33 (L)  40 - 60 mg/dL Final   • LDL Cholesterol  01/12/2022 93  0 - 100 mg/dL Final   • VLDL Cholesterol 01/12/2022 19  5 - 40 mg/dL Final   • LDL/HDL Ratio 01/12/2022 2.79   Final   • TSH 01/12/2022 2.790  0.270 - 4.200 uIU/mL Final   • Color 01/12/2022 Dark Yellow  Yellow, Straw, Dark Yellow, Kizzy Final   • Clarity, UA 01/12/2022 Clear  Clear Final   • Glucose, UA 01/12/2022 Negative  Negative, 1000 mg/dL (3+) mg/dL Final   • Bilirubin 01/12/2022 Negative  Negative Final   • Ketones, UA 01/12/2022 Negative  Negative Final   • Specific Gravity  01/12/2022 1.030  1.005 - 1.030 Final   • Blood, UA 01/12/2022 3+ (A)  Negative Final   • pH, Urine 01/12/2022 6.0  5.0 - 8.0 Final   • Protein, POC 01/12/2022 Trace (A)  Negative mg/dL Final   • Urobilinogen, UA 01/12/2022  Normal  Normal Final   • Leukocytes 01/12/2022 Negative  Negative Final   • Nitrite, UA 01/12/2022 Positive (A)  Negative Final   • WBC 01/12/2022 9.57  3.40 - 10.80 10*3/mm3 Final   • RBC 01/12/2022 5.31 (H)  3.77 - 5.28 10*6/mm3 Final   • Hemoglobin 01/12/2022 12.5  12.0 - 15.9 g/dL Final   • Hematocrit 01/12/2022 42.0  34.0 - 46.6 % Final   • MCV 01/12/2022 79.1  79.0 - 97.0 fL Final   • MCH 01/12/2022 23.5 (L)  26.6 - 33.0 pg Final   • MCHC 01/12/2022 29.8 (L)  31.5 - 35.7 g/dL Final   • RDW 01/12/2022 16.5 (H)  12.3 - 15.4 % Final   • RDW-SD 01/12/2022 46.5  37.0 - 54.0 fl Final   • MPV 01/12/2022 10.8  6.0 - 12.0 fL Final   • Platelets 01/12/2022 347  140 - 450 10*3/mm3 Final   • Neutrophil % 01/12/2022 58.5  42.7 - 76.0 % Final   • Lymphocyte % 01/12/2022 31.3  19.6 - 45.3 % Final   • Monocyte % 01/12/2022 6.4  5.0 - 12.0 % Final   • Eosinophil % 01/12/2022 3.4  0.3 - 6.2 % Final   • Basophil % 01/12/2022 0.2  0.0 - 1.5 % Final   • Immature Grans % 01/12/2022 0.2  0.0 - 0.5 % Final   • Neutrophils, Absolute 01/12/2022 5.59  1.70 - 7.00 10*3/mm3 Final   • Lymphocytes, Absolute 01/12/2022 3.00  0.70 - 3.10 10*3/mm3 Final   • Monocytes, Absolute 01/12/2022 0.61  0.10 - 0.90 10*3/mm3 Final   • Eosinophils, Absolute 01/12/2022 0.33  0.00 - 0.40 10*3/mm3 Final   • Basophils, Absolute 01/12/2022 0.02  0.00 - 0.20 10*3/mm3 Final   • Immature Grans, Absolute 01/12/2022 0.02  0.00 - 0.05 10*3/mm3 Final   • nRBC 01/12/2022 0.0  0.0 - 0.2 /100 WBC Final         Assessment & Plan   Problems Addressed this Visit    None  Visit Diagnoses     Severe episode of recurrent major depressive disorder, without psychotic features (HCC)  (Chronic)   -  Primary    R/O MDD R/O mood disorder    Relevant Medications    Vortioxetine HBr (Trintellix) 5 MG tablet tablet    buPROPion XL (WELLBUTRIN XL) 150 MG 24 hr tablet    buPROPion XL (WELLBUTRIN XL) 300 MG 24 hr tablet    ARIPiprazole (ABILIFY) 15 MG tablet    Anxiety disorder,  unspecified type  (Chronic)       Relevant Medications    Vortioxetine HBr (Trintellix) 5 MG tablet tablet    buPROPion XL (WELLBUTRIN XL) 150 MG 24 hr tablet    buPROPion XL (WELLBUTRIN XL) 300 MG 24 hr tablet    ARIPiprazole (ABILIFY) 15 MG tablet    Nightmares        Relevant Medications    Vortioxetine HBr (Trintellix) 5 MG tablet tablet    buPROPion XL (WELLBUTRIN XL) 150 MG 24 hr tablet    buPROPion XL (WELLBUTRIN XL) 300 MG 24 hr tablet    ARIPiprazole (ABILIFY) 15 MG tablet    prazosin (MINIPRESS) 2 MG capsule    History of borderline personality disorder          Diagnoses       Codes Comments    Severe episode of recurrent major depressive disorder, without psychotic features (HCC)    -  Primary ICD-10-CM: F33.2  ICD-9-CM: 296.33 R/O MDD R/O mood disorder    Anxiety disorder, unspecified type     ICD-10-CM: F41.9  ICD-9-CM: 300.00     Nightmares     ICD-10-CM: F51.5  ICD-9-CM: 307.47     History of borderline personality disorder     ICD-10-CM: Z86.59  ICD-9-CM: V11.8           Visit Diagnoses:    ICD-10-CM ICD-9-CM   1. Severe episode of recurrent major depressive disorder, without psychotic features (HCC)  F33.2 296.33   2. Anxiety disorder, unspecified type  F41.9 300.00   3. Nightmares  F51.5 307.47   4. History of borderline personality disorder  Z86.59 V11.8          GOALS:  Short Term Goals: Patient will be compliant with medication, and patient will have no significant medication related side effects.  Patient will be engaged in psychotherapy as indicated.  Patient will report subjective improvement of symptoms.  Long term goals: To stabilize mood and treat/improve subjective symptoms, the patient will stay out of the hospital, the patient will be at an optimal level of functioning, and the patient will take all medications as prescribed.  The patient verbalized understanding and agreement with goals that were mutually set.      TREATMENT PLAN: Restart supportive psychotherapy efforts and take  medications as indicated.  The patient declines restarting psychotherapy at today's encounter.  Medication and treatment options, both pharmacological and non-pharmacological treatment options, discussed during today's visit, including any off label use of medication. Patient acknowledged and verbally consented with current treatment plan and was educated on the importance of compliance with treatment and follow-up appointments.      -Continue Abilify 7.5 mg by mouth once daily for mood.  -Continue Wellbutrin  mg by mouth once daily for mood.  -Continue prazosin 2 mg by mouth once daily at bedtime for PTSD/nightmares.  -Increase Trintellix to 15 mg by mouth once daily for mood.      MEDICATION ISSUES:  Discussed medication options and treatment plan of prescribed medication, any off label use of medication, as well as the risks, benefits, any black box warnings including increased suicidality, and side effects including but not limited to potential falls, dizziness, possible impaired driving, GI side effects (change in appetite, abdominal discomfort, nausea, vomiting, diarrhea, and/or constipation), dry mouth, somnolence, sedation, insomnia, activation, agitation, irritation, tremors, abnormal muscle movements or disorders, tardive dyskinesia, akathisia, asthenia, headache, sweating, possible bruising or rare bleeding, electrolyte and/or fluid abnormalities, change in blood pressure/heart rate/and or heart rhythm, hypotension, sexual dysfunction, rare impulse control problems, rare seizures, rare neuroleptic malignant syndrome, increased risk of death and cerebrovascular events, change in blood glucose and increased risk for diabetes, change in triglycerides and cholesterol and increased risk for dyslipidemia,  weight gain, weight gain that can become problematic to health, skin conditions and reactions, and metabolic adversities among others. Patient and/or guardian are agreeable to call the office with any  worsening of symptoms or onset of side effects, or if any concerns or questions arise.  The contact information for the office is made available to the patient and/or guardian. Patient and/or guardian are agreeable to call 911 or go to the nearest ER should they begin having any SI/HI, or if any urgent concerns arise.      VERBAL INFORMED CONSENT FOR MEDICATION:  The patient was educated that their proposed/prescribed psychotropic medication(s) has potential risks, side effects, adverse effects, and black box warnings; and these have been discussed with the patient.  The patient has been informed that their treatment and medication dosage is to be individualized, and may even be above or below the recommended range/dosage due to patient individualization and response, but medication is prescribed using a shared decision making approach, and no medication or dosage will be prescribed without the patient's verbal consent.  The reason for the use of the medication including any off label use and alternative modes of treatment other than or in addition to medication has been considered and discussed, the probable consequences of not receiving the proposed treatment have been discussed, and any treatment side effects, black box warnings, and cautions associated with treatment have been discussed with the patient.  The patient is allowed ample time to openly discuss and ask questions regarding the proposed medication(s) and treatment plan and the patient verbalizes understanding the reasons for the use of the medication, its potential risks and benefits, other alternative treatment(s), and the probable consequences that may occur if the proposed medication is not given.  The patient has been given ample time to ask questions and study the information and find the information to be specific, accurate, and complete.  The patient gives verbal consent for the medication(s) proposed/prescribed, they verbalized understanding  that they can refuse and withdraw consent at any time with the assistance of this APRN, and the patient has verbally confirmed that they are aware, and are willing, to take the prescribed medication and follow the treatment plan with the known possible risks, side effect, black box warnings, and any potential medication interactions, and the patient reports they will be worse off without this medication and treatment plan.  The patient is advised to contact this APRN/this office if any questions or concerns arise at any time (at [phone]), or call 911/go to the closest emergency department if needed or outside of office hours.      SUICIDE RISK ASSESSMENT AND SAFETY PLAN: Unalterable demographics and a history of mental health intervention indicate this patient is in a high risk category compared to the general population. At present, the patient denies active SI/HI, intentions, or plans at this time and agrees to seek immediate care should such thoughts develop. The patient verbalizes understanding of how to access emergency care if needed and agrees to do so. Consideration of suicide risk and protective factors such as history, current presentation, individual strengths and weaknesses, psychosocial and environmental stressors and variables, psychiatric illness and symptoms, medical conditions and pain, took place in this interview. Based on those considerations, the patient is determined: within individual baseline and presenting no imminent risk for suicide or homicide. Other recommendations: The patient does not meet the criteria for inpatient admission and is not a safety risk to self or others at today's visit. Inpatient treatment offers no significant advantages over outpatient treatment for this patient at today's visit.  The patient was given ample time for questions and fully participated in treatment planning.  The patient was encouraged to call the clinic with any questions or concerns.  The patient  was informed of access to emergency care. If patient were to develop any significant symptomatology, suicidal ideation, homicidal ideation, any concerns, or feel unsafe at any time they are to call the clinic and if unable to get immediate assistance should immediately call 911 or go to the nearest emergency room.  Patient contracted verbally for the following: If you are experiencing an emotional crisis or have thoughts of harming yourself or others, please go to your nearest local emergency room or call 911. Will continue to re-assess medication response and side effects frequently to establish efficacy and ensure safety. Risks, any black box warnings, side effects, off label usage, and benefits of medication and treatment discussed with patient, along with potential adverse side effects of current and/or newly prescribed medication, alternative treatment options, and OTC medications.  Patient verbalized understanding of potential risks, any off label use of medication, any black box warnings, and any side effects in their own words. The patient verbalized understanding and agreed to comply with the safety plan discussed in their own words.  Patient given the number to the office. Number also discussed of the 24- hour suicide hotline.         MEDS ORDERED DURING VISIT:  New Medications Ordered This Visit   Medications   • Vortioxetine HBr (Trintellix) 5 MG tablet tablet     Sig: Take 3 tablets by mouth Daily With Breakfast.     Dispense:  90 tablet     Refill:  0   • buPROPion XL (WELLBUTRIN XL) 150 MG 24 hr tablet     Sig: Take 1 tablet by mouth Every Morning.     Dispense:  30 tablet     Refill:  0   • buPROPion XL (WELLBUTRIN XL) 300 MG 24 hr tablet     Sig: Take 1 tablet by mouth Every Morning.     Dispense:  30 tablet     Refill:  0   • ARIPiprazole (ABILIFY) 15 MG tablet     Sig: Take 0.5 tablets by mouth Daily.     Dispense:  15 tablet     Refill:  0   • prazosin (MINIPRESS) 2 MG capsule     Sig: Take 1  capsule by mouth Every Night.     Dispense:  30 capsule     Refill:  0       Return in about 4 weeks (around 4/12/2023), or if symptoms worsen or fail to improve, for Next scheduled follow up and Recheck.         Functional Status: Moderate impairment     Prognosis: Fair with Ongoing Treatment             This document has been electronically signed by ELISE Dye  March 15, 2023 15:02 EDT    Some of the data in this electronic note has been brought forward from a previous encounter, any necessary changes have been made, it has been reviewed by this APRN, and it is [email or web address]    Please note that portions of this note were completed with a voice recognition program.

## 2023-04-13 ENCOUNTER — TELEMEDICINE (OUTPATIENT)
Dept: PSYCHIATRY | Facility: CLINIC | Age: 28
End: 2023-04-13
Payer: COMMERCIAL

## 2023-04-13 DIAGNOSIS — F41.9 ANXIETY DISORDER, UNSPECIFIED TYPE: Chronic | ICD-10-CM

## 2023-04-13 DIAGNOSIS — F33.1 MODERATE EPISODE OF RECURRENT MAJOR DEPRESSIVE DISORDER: Primary | Chronic | ICD-10-CM

## 2023-04-13 DIAGNOSIS — G47.9 SLEEPING DIFFICULTIES: ICD-10-CM

## 2023-04-13 DIAGNOSIS — F51.5 NIGHTMARES: ICD-10-CM

## 2023-04-13 PROCEDURE — 1159F MED LIST DOCD IN RCRD: CPT | Performed by: NURSE PRACTITIONER

## 2023-04-13 PROCEDURE — 1160F RVW MEDS BY RX/DR IN RCRD: CPT | Performed by: NURSE PRACTITIONER

## 2023-04-13 PROCEDURE — 99214 OFFICE O/P EST MOD 30 MIN: CPT | Performed by: NURSE PRACTITIONER

## 2023-04-13 RX ORDER — BUPROPION HYDROCHLORIDE 150 MG/1
TABLET ORAL
Qty: 9 TABLET | Refills: 0 | Status: SHIPPED | OUTPATIENT
Start: 2023-04-13 | End: 2023-04-19

## 2023-04-13 RX ORDER — PRAZOSIN HYDROCHLORIDE 2 MG/1
2 CAPSULE ORAL NIGHTLY
Qty: 30 CAPSULE | Refills: 0 | Status: SHIPPED | OUTPATIENT
Start: 2023-04-13

## 2023-04-13 RX ORDER — ARIPIPRAZOLE 15 MG/1
7.5 TABLET ORAL DAILY
Qty: 15 TABLET | Refills: 0 | Status: SHIPPED | OUTPATIENT
Start: 2023-04-13

## 2023-04-13 NOTE — PROGRESS NOTES
This provider is located at the [facility] (through [facility]), [address], [facility], [address] using a secure [employer] Video Visit through [employer]. Patient is being seen remotely via telehealth at their home address in [place], and stated they are in a secure environment for this session. The patient's condition being diagnosed/treated is appropriate for telemedicine. The provider identified herself as well as her credentials.   The patient, and/or patients guardian, consent to be seen remotely, and when consent is given they understand that the consent allows for patient identifiable information to be sent to a third party as needed.   They may refuse to be seen remotely at any time. The electronic data is encrypted and password protected, and the patient and/or guardian has been advised of the potential risks to privacy not withstanding such measures.    You have chosen to receive care through a telehealth visit.  Do you consent to use a video/audio connection for your medical care today? Yes    Patient identifiers utilized: Name and date of birth.    Patient verbally confirmed consent for today's encounter 4/13/23.    Subjective   Kamryn Gross is a 27 y.o. female who presents today for follow up    Chief Complaint: Medication management follow-up - Depression, anxiety, history of PTSD, history of nightmares, and history of hallucinations follow-up    Accompanied by: The patient reports she is alone at today's encounter    History of Present Illness:   The patient describes her mood as improved and doing good since her last encounter with this APRN.  The patient reports the recent increase in Trintellix has improved her moods, and she has been having more good days than bad days.  The patient reports her fiancé has moved in with her and her family, and this has been good for her.  She reports they are looking at getting their own place together, and she is excited  about this.  The patient reports her and her fiancé are also looking at possibly trying to conceive, and she is wanting to make some changes in her current medication regimen to be safer for a possible upcoming pregnancy.  The patient reports she is willing and plans to wait until her psychotropic medication changes have been made, and she knows her moods are stable, before trying to conceive.  The patient rates her symptoms of depression at a 4/10 on a 0-10 scale, with 10 being the worst.  The patient reports her symptoms of depression as sometimes feeling sad, and crying easily.  The patient rates her symptoms of anxiety at a 3/10 on a 0-10 scale, with 10 being the worst.  The patient reports she really is not experiencing any anxiety at this time.  The patient reports her appetite as good.  The patient reports she is using Slim fast for breakfast and lunch replacement, and has been cutting back on her portions and eating  a regular supper with snacks, and has been trying to lose weight.  The patient reports her sleep as good.  The patient denies any recent nightmares.  The patient denies any new medical problems or changes in medications since last appointment with this facility, and reports she did recently established with a new primary care provider.  The patient reports compliance with her current medication regimen.  The patient denies any side effects or concerns from her current medication regimen.  The patient does not endorse any symptoms consistent with EPS or TD.  The patient denies any auditory hallucinations or visual hallucinations.  The patient does not endorse any significant symptoms consistent with matt or psychosis at today's encounter.  The patient denies any suicidal or homicidal ideations, plans, or intent at today's encounter and is convincing.  The patient reports she would like to make adjustments in her current psychotropic medication regimen at today's encounter so her regimen will be  safer for her and her fiancé to hopefully soon start trying to conceive.  The patient also requests to begin psychotherapy at today's encounter, a referral has been placed per patient request and consent.  The patient does verbally contract for safety today's encounter, and reports she will reach out to this office/APRN prior to her next scheduled appointment if there is any worsening of her moods, any new psychiatric symptoms, any SI/HI, or any concerns.      Primary Care Provider:  Eli Rajput      All Known Prior Psychiatric Medications:  -Prozac  -Zoloft  -Trileptal  -Paxil  -Hydroxyzine  -Desvenlafaxine/Pristiq  -Prazosin  -Wellbutrin XL  -Abilify  -Trintellix      Last Menstrual Period:  3/26/23 - 4/1/23.  The patient was educated that her prescribed medications can have potential risk to a developing fetus. The patient is advised to contact this APRN/this office if she becomes pregnant or plans to become pregnant.  Pt verbalizes understanding and acknowledged agreement with this plan in her own words.        The following portions of the patient's history were reviewed and updated as appropriate: allergies, current medications, past family history, past medical history, past social history, past surgical history and problem list.          Past Medical History:  Past Medical History:   Diagnosis Date   • Anxiety    • Bipolar disorder    • Depression    • Headache    • Obesity    • Panic disorder    • PTSD (post-traumatic stress disorder)    • Self-injurious behavior    • Sleep apnea    • Suicide attempt     attempted overdose in 2017       Social History:  Social History     Socioeconomic History   • Marital status: Single   Tobacco Use   • Smoking status: Every Day     Packs/day: 0.50     Years: 8.00     Pack years: 4.00     Types: Cigarettes     Start date: 2014   • Smokeless tobacco: Never   Vaping Use   • Vaping Use: Never used   Substance and Sexual Activity   • Alcohol use: Not Currently   • Drug use: Not  Currently     Types: Marijuana   • Sexual activity: Yes     Partners: Male     Birth control/protection: None       Family History:  Family History   Problem Relation Age of Onset   • Diabetes Mother    • Anxiety disorder Mother    • Depression Mother    • Anxiety disorder Father    • Depression Father    • Anxiety disorder Sister    • Depression Sister    • Depression Maternal Aunt    • Heart attack Paternal Aunt    • Anxiety disorder Paternal Aunt    • Depression Paternal Aunt    • No Known Problems Maternal Uncle    • No Known Problems Paternal Uncle    • Lung disease Maternal Grandfather    • Alzheimer's disease Maternal Grandmother    • Lung cancer Paternal Grandfather    • Alzheimer's disease Paternal Grandmother    • Alcohol abuse Paternal Grandmother    • Anxiety disorder Sister    • Depression Sister    • Anxiety disorder Paternal Aunt    • Depression Paternal Aunt    • No Known Problems Maternal Uncle    • No Known Problems Paternal Uncle    • Heart attack Paternal Uncle    • Other Paternal Uncle        Past Surgical History:  Past Surgical History:   Procedure Laterality Date   • TONSILLECTOMY         Problem List:  Patient Active Problem List   Diagnosis   • Major depressive disorder, recurrent, moderate   • Class 3 severe obesity without serious comorbidity with body mass index (BMI) of 50.0 to 59.9 in adult   • Smoker   • Migraine without aura and without status migrainosus, not intractable   • Generalized anxiety disorder   • History of sleep apnea   • PTSD (post-traumatic stress disorder)   • Sleep apnea       Allergy:   No Known Allergies     Current Medications:   Current Outpatient Medications   Medication Sig Dispense Refill   • ARIPiprazole (ABILIFY) 15 MG tablet Take 0.5 tablets by mouth Daily. 15 tablet 0   • buPROPion XL (WELLBUTRIN XL) 150 MG 24 hr tablet Take 2 tablets by mouth Every Morning for 3 days, THEN 1 tablet Every Morning for 3 days. Then completely stop taking. 9 tablet 0   •  prazosin (MINIPRESS) 2 MG capsule Take 1 capsule by mouth Every Night. 30 capsule 0   • Vortioxetine HBr (Trintellix) 5 MG tablet tablet Take 3 tablets by mouth Daily With Breakfast. 90 tablet 0   • SUMAtriptan (Imitrex) 25 MG tablet Take one tablet at onset of headache. May repeat dose one time in 2 hours if headache not relieved. 20 tablet 5     No current facility-administered medications for this visit.       Review of Symptoms:    Review of Systems   Psychiatric/Behavioral: Positive for depressed mood and stress. Negative for agitation, behavioral problems, decreased concentration, dysphoric mood, hallucinations, self-injury, sleep disturbance, suicidal ideas and negative for hyperactivity. The patient is nervous/anxious.          Physical Exam:   There were no vitals taken for this visit. There is no height or weight on file to calculate BMI.   Due to the remote nature of this encounter (virtual encounter), vitals were unable to be obtained.  Height stated at 69 inches.  Weight stated at around 353 pounds.      Physical Exam  Neurological:      Mental Status: She is alert and oriented to person, place, and time.   Psychiatric:         Attention and Perception: Attention normal.         Mood and Affect: Mood and affect normal.         Speech: Speech normal.         Behavior: Behavior normal. Behavior is cooperative.         Thought Content: Thought content normal. Thought content is not paranoid or delusional. Thought content does not include homicidal or suicidal ideation. Thought content does not include homicidal or suicidal plan.         Cognition and Memory: Cognition and memory normal.         Judgment: Judgment normal.           Mental Status Exam:   Hygiene:   good  Cooperation:  Cooperative  Eye Contact:  Good  Psychomotor Behavior:  Appropriate  Affect:  Appropriate  Mood: normal  Hopelessness: Denies  Speech:  Normal  Thought Process:  Linear  Thought Content:  Mood congruent  Suicidal:   None  Homicidal:  None  Hallucinations:  Not demonstrated today  Delusion:  None  Memory:  Intact  Orientation:  Person, Place, Time and Situation  Reliability:  good  Insight:  Good  Judgement:  Good  Impulse Control:  Good  Physical/Medical Issues:  No           Lab Results:   No visits with results within 1 Month(s) from this visit.   Latest known visit with results is:   Office Visit on 01/12/2022   Component Date Value Ref Range Status   • Glucose 01/12/2022 95  65 - 99 mg/dL Final   • BUN 01/12/2022 9  6 - 20 mg/dL Final   • Creatinine 01/12/2022 1.01 (H)  0.57 - 1.00 mg/dL Final   • Sodium 01/12/2022 142  136 - 145 mmol/L Final   • Potassium 01/12/2022 4.2  3.5 - 5.2 mmol/L Final   • Chloride 01/12/2022 106  98 - 107 mmol/L Final   • CO2 01/12/2022 27.3  22.0 - 29.0 mmol/L Final   • Calcium 01/12/2022 9.2  8.6 - 10.5 mg/dL Final   • Total Protein 01/12/2022 7.5  6.0 - 8.5 g/dL Final   • Albumin 01/12/2022 4.23  3.50 - 5.20 g/dL Final   • ALT (SGPT) 01/12/2022 32  1 - 33 U/L Final   • AST (SGOT) 01/12/2022 20  1 - 32 U/L Final   • Alkaline Phosphatase 01/12/2022 82  39 - 117 U/L Final   • Total Bilirubin 01/12/2022 0.3  0.0 - 1.2 mg/dL Final   • eGFR Non  Amer 01/12/2022 67  >60 mL/min/1.73 Final   • Globulin 01/12/2022 3.3  gm/dL Final   • A/G Ratio 01/12/2022 1.3  g/dL Final   • BUN/Creatinine Ratio 01/12/2022 8.9  7.0 - 25.0 Final   • Anion Gap 01/12/2022 8.7  5.0 - 15.0 mmol/L Final   • Total Cholesterol 01/12/2022 145  0 - 200 mg/dL Final   • Triglycerides 01/12/2022 100  0 - 150 mg/dL Final   • HDL Cholesterol 01/12/2022 33 (L)  40 - 60 mg/dL Final   • LDL Cholesterol  01/12/2022 93  0 - 100 mg/dL Final   • VLDL Cholesterol 01/12/2022 19  5 - 40 mg/dL Final   • LDL/HDL Ratio 01/12/2022 2.79   Final   • TSH 01/12/2022 2.790  0.270 - 4.200 uIU/mL Final   • Color 01/12/2022 Dark Yellow  Yellow, Straw, Dark Yellow, Kizzy Final   • Clarity, UA 01/12/2022 Clear  Clear Final   • Glucose, UA 01/12/2022  Negative  Negative, 1000 mg/dL (3+) mg/dL Final   • Bilirubin 01/12/2022 Negative  Negative Final   • Ketones, UA 01/12/2022 Negative  Negative Final   • Specific Gravity  01/12/2022 1.030  1.005 - 1.030 Final   • Blood, UA 01/12/2022 3+ (A)  Negative Final   • pH, Urine 01/12/2022 6.0  5.0 - 8.0 Final   • Protein, POC 01/12/2022 Trace (A)  Negative mg/dL Final   • Urobilinogen, UA 01/12/2022 Normal  Normal Final   • Leukocytes 01/12/2022 Negative  Negative Final   • Nitrite, UA 01/12/2022 Positive (A)  Negative Final   • WBC 01/12/2022 9.57  3.40 - 10.80 10*3/mm3 Final   • RBC 01/12/2022 5.31 (H)  3.77 - 5.28 10*6/mm3 Final   • Hemoglobin 01/12/2022 12.5  12.0 - 15.9 g/dL Final   • Hematocrit 01/12/2022 42.0  34.0 - 46.6 % Final   • MCV 01/12/2022 79.1  79.0 - 97.0 fL Final   • MCH 01/12/2022 23.5 (L)  26.6 - 33.0 pg Final   • MCHC 01/12/2022 29.8 (L)  31.5 - 35.7 g/dL Final   • RDW 01/12/2022 16.5 (H)  12.3 - 15.4 % Final   • RDW-SD 01/12/2022 46.5  37.0 - 54.0 fl Final   • MPV 01/12/2022 10.8  6.0 - 12.0 fL Final   • Platelets 01/12/2022 347  140 - 450 10*3/mm3 Final   • Neutrophil % 01/12/2022 58.5  42.7 - 76.0 % Final   • Lymphocyte % 01/12/2022 31.3  19.6 - 45.3 % Final   • Monocyte % 01/12/2022 6.4  5.0 - 12.0 % Final   • Eosinophil % 01/12/2022 3.4  0.3 - 6.2 % Final   • Basophil % 01/12/2022 0.2  0.0 - 1.5 % Final   • Immature Grans % 01/12/2022 0.2  0.0 - 0.5 % Final   • Neutrophils, Absolute 01/12/2022 5.59  1.70 - 7.00 10*3/mm3 Final   • Lymphocytes, Absolute 01/12/2022 3.00  0.70 - 3.10 10*3/mm3 Final   • Monocytes, Absolute 01/12/2022 0.61  0.10 - 0.90 10*3/mm3 Final   • Eosinophils, Absolute 01/12/2022 0.33  0.00 - 0.40 10*3/mm3 Final   • Basophils, Absolute 01/12/2022 0.02  0.00 - 0.20 10*3/mm3 Final   • Immature Grans, Absolute 01/12/2022 0.02  0.00 - 0.05 10*3/mm3 Final   • nRBC 01/12/2022 0.0  0.0 - 0.2 /100 WBC Final         Assessment & Plan   Problems Addressed this Visit    None  Visit  Diagnoses     Moderate episode of recurrent major depressive disorder  (Chronic)   -  Primary    R/O MDD R/O mood disorder    Relevant Medications    ARIPiprazole (ABILIFY) 15 MG tablet    buPROPion XL (WELLBUTRIN XL) 150 MG 24 hr tablet    Vortioxetine HBr (Trintellix) 5 MG tablet tablet    Other Relevant Orders    Ambulatory Referral to Behavioral Health    Anxiety disorder, unspecified type  (Chronic)       Relevant Medications    ARIPiprazole (ABILIFY) 15 MG tablet    buPROPion XL (WELLBUTRIN XL) 150 MG 24 hr tablet    Vortioxetine HBr (Trintellix) 5 MG tablet tablet    Other Relevant Orders    Ambulatory Referral to [facility]    Sleeping difficulties        Nightmares        Relevant Medications    ARIPiprazole (ABILIFY) 15 MG tablet    buPROPion XL (WELLBUTRIN XL) 150 MG 24 hr tablet    prazosin (MINIPRESS) 2 MG capsule    Vortioxetine HBr (Trintellix) 5 MG tablet tablet      Diagnoses       Codes Comments    Moderate episode of recurrent major depressive disorder    -  Primary ICD-10-CM: F33.1  ICD-9-CM: 296.32 R/O MDD R/O mood disorder    Anxiety disorder, unspecified type     ICD-10-CM: F41.9  ICD-9-CM: 300.00     Sleeping difficulties     ICD-10-CM: G47.9  ICD-9-CM: 780.50     Nightmares     ICD-10-CM: F51.5  ICD-9-CM: 307.47           Visit Diagnoses:    ICD-10-CM ICD-9-CM   1. Moderate episode of recurrent major depressive disorder  F33.1 296.32   2. Anxiety disorder, unspecified type  F41.9 300.00   3. Sleeping difficulties  G47.9 780.50   4. Nightmares  F51.5 307.47          GOALS:  Short Term Goals: Patient will be compliant with medication, and patient will have no significant medication related side effects.  Patient will be engaged in psychotherapy as indicated.  Patient will report subjective improvement of symptoms.  Long term goals: To stabilize mood and treat/improve subjective symptoms, the patient will stay out of the hospital, the patient will be at an optimal level of functioning,  and the patient will take all medications as prescribed.  The patient verbalized understanding and agreement with goals that were mutually set.      TREATMENT PLAN: Restart supportive psychotherapy efforts and take medications as indicated.  The patient declines restarting psychotherapy at today's encounter.  Medication and treatment options, both pharmacological and non-pharmacological treatment options, discussed during today's visit, including any off label use of medication. Patient acknowledged and verbally consented with current treatment plan and was educated on the importance of compliance with treatment and follow-up appointments.      -Continue Abilify 7.5 mg by mouth once daily for mood.  -Continue prazosin 2 mg by mouth once daily at bedtime for PTSD/nightmares.  -Continue Trintellix 15 mg by mouth once daily for mood.  -Referral to begin psychotherapy has been placed at today's encounter with patient's verbal request and consent.  -Taper and discontinue Wellbutrin XL by taking Wellbutrin  mg by mouth once daily for 3 days, then take Wellbutrin  mg by mouth once daily for 3 days, then completely stop taking Wellbutrin XL.      MEDICATION ISSUES:  Discussed medication options and treatment plan of prescribed medication, any off label use of medication, as well as the risks, benefits, any black box warnings including increased suicidality, and side effects including but not limited to potential falls, dizziness, possible impaired driving, GI side effects (change in appetite, abdominal discomfort, nausea, vomiting, diarrhea, and/or constipation), dry mouth, somnolence, sedation, insomnia, activation, agitation, irritation, tremors, abnormal muscle movements or disorders, tardive dyskinesia, akathisia, asthenia, headache, sweating, possible bruising or rare bleeding, electrolyte and/or fluid abnormalities, change in blood pressure/heart rate/and or heart rhythm, hypotension, sexual dysfunction,  rare impulse control problems, rare seizures, rare neuroleptic malignant syndrome, increased risk of death and cerebrovascular events, change in blood glucose and increased risk for diabetes, change in triglycerides and cholesterol and increased risk for dyslipidemia,  weight gain, weight gain that can become problematic to health, skin conditions and reactions, and metabolic adversities among others. Patient and/or guardian are agreeable to call the office with any worsening of symptoms or onset of side effects, or if any concerns or questions arise.  The contact information for the office is made available to the patient and/or guardian. Patient and/or guardian are agreeable to call 911 or go to the nearest ER should they begin having any SI/HI, or if any urgent concerns arise.      VERBAL INFORMED CONSENT FOR MEDICATION:  The patient was educated that their proposed/prescribed psychotropic medication(s) has potential risks, side effects, adverse effects, and black box warnings; and these have been discussed with the patient.  The patient has been informed that their treatment and medication dosage is to be individualized, and may even be above or below the recommended range/dosage due to patient individualization and response, but medication is prescribed using a shared decision making approach, and no medication or dosage will be prescribed without the patient's verbal consent.  The reason for the use of the medication including any off label use and alternative modes of treatment other than or in addition to medication has been considered and discussed, the probable consequences of not receiving the proposed treatment have been discussed, and any treatment side effects, black box warnings, and cautions associated with treatment have been discussed with the patient.  The patient is allowed ample time to openly discuss and ask questions regarding the proposed medication(s) and treatment plan and the patient  verbalizes understanding the reasons for the use of the medication, its potential risks and benefits, other alternative treatment(s), and the probable consequences that may occur if the proposed medication is not given.  The patient has been given ample time to ask questions and study the information and find the information to be specific, accurate, and complete.  The patient gives verbal consent for the medication(s) proposed/prescribed, they verbalized understanding that they can refuse and withdraw consent at any time with the assistance of this APRN, and the patient has verbally confirmed that they are aware, and are willing, to take the prescribed medication and follow the treatment plan with the known possible risks, side effect, black box warnings, and any potential medication interactions, and the patient reports they will be worse off without this medication and treatment plan.  The patient is advised to contact this APRN/this office if any questions or concerns arise at any time (at [phone]), or call 911/go to the closest emergency department if needed or outside of office hours.      SUICIDE RISK ASSESSMENT AND SAFETY PLAN: Unalterable demographics and a history of mental health intervention indicate this patient is in a high risk category compared to the general population. At present, the patient denies active SI/HI, intentions, or plans at this time and agrees to seek immediate care should such thoughts develop. The patient verbalizes understanding of how to access emergency care if needed and agrees to do so. Consideration of suicide risk and protective factors such as history, current presentation, individual strengths and weaknesses, psychosocial and environmental stressors and variables, psychiatric illness and symptoms, medical conditions and pain, took place in this interview. Based on those considerations, the patient is determined: within individual baseline and presenting no imminent  risk for suicide or homicide. Other recommendations: The patient does not meet the criteria for inpatient admission and is not a safety risk to self or others at today's visit. Inpatient treatment offers no significant advantages over outpatient treatment for this patient at today's visit.  The patient was given ample time for questions and fully participated in treatment planning.  The patient was encouraged to call the clinic with any questions or concerns.  The patient was informed of access to emergency care. If patient were to develop any significant symptomatology, suicidal ideation, homicidal ideation, any concerns, or feel unsafe at any time they are to call the clinic and if unable to get immediate assistance should immediately call 911 or go to the nearest emergency room.  Patient contracted verbally for the following: If you are experiencing an emotional crisis or have thoughts of harming yourself or others, please go to your nearest local emergency room or call 911. Will continue to re-assess medication response and side effects frequently to establish efficacy and ensure safety. Risks, any black box warnings, side effects, off label usage, and benefits of medication and treatment discussed with patient, along with potential adverse side effects of current and/or newly prescribed medication, alternative treatment options, and OTC medications.  Patient verbalized understanding of potential risks, any off label use of medication, any black box warnings, and any side effects in their own words. The patient verbalized understanding and agreed to comply with the safety plan discussed in their own words.  Patient given the number to the office. Number also discussed of the 24- hour suicide hotline.         MEDS ORDERED DURING VISIT:  New Medications Ordered This Visit   Medications   • ARIPiprazole (ABILIFY) 15 MG tablet     Sig: Take 0.5 tablets by mouth Daily.     Dispense:  15 tablet     Refill:  0   •  buPROPion XL (WELLBUTRIN XL) 150 MG 24 hr tablet     Sig: Take 2 tablets by mouth Every Morning for 3 days, THEN 1 tablet Every Morning for 3 days. Then completely stop taking.     Dispense:  9 tablet     Refill:  0   • prazosin (MINIPRESS) 2 MG capsule     Sig: Take 1 capsule by mouth Every Night.     Dispense:  30 capsule     Refill:  0   • Vortioxetine HBr (Trintellix) 5 MG tablet tablet     Sig: Take 3 tablets by mouth Daily With Breakfast.     Dispense:  90 tablet     Refill:  0       Return in about 2 weeks (around 4/27/2023), or if symptoms worsen or fail to improve, for Next scheduled follow up and Recheck.         Functional Status: Mild impairment     Prognosis: Good with Ongoing Treatment             This document has been electronically signed by ELISE Dye  April 13, 2023 09:51 EDT    Some of the data in this electronic note has been brought forward from a previous encounter, any necessary changes have been made, it has been reviewed by this APRN, and it is accurate.    Please note that portions of this note were completed with a voice recognition program.

## 2023-04-27 ENCOUNTER — TELEMEDICINE (OUTPATIENT)
Dept: PSYCHIATRY | Facility: CLINIC | Age: 28
End: 2023-04-27
Payer: COMMERCIAL

## 2023-04-27 DIAGNOSIS — F33.1 MODERATE EPISODE OF RECURRENT MAJOR DEPRESSIVE DISORDER: Primary | Chronic | ICD-10-CM

## 2023-04-27 DIAGNOSIS — F41.9 ANXIETY DISORDER, UNSPECIFIED TYPE: Chronic | ICD-10-CM

## 2023-04-27 RX ORDER — ARIPIPRAZOLE 15 MG/1
7.5 TABLET ORAL DAILY
Qty: 15 TABLET | Refills: 0 | Status: SHIPPED | OUTPATIENT
Start: 2023-04-27

## 2023-04-27 NOTE — PROGRESS NOTES
This provider is located at the [facility] (through [facility]), [address] [place] [address], [facility], [address] using a secure [employer] Video Visit through [email or web address]. Patient is being seen remotely via telehealth at their home address in [place], and stated they are in a secure environment for this session. The patient's condition being diagnosed/treated is appropriate for telemedicine. The provider identified herself as well as her credentials.   The patient, and/or patients guardian, consent to be seen remotely, and when consent is given they understand that the consent allows for patient identifiable information to be sent to a third party as needed.   They may refuse to be seen remotely at any time. The electronic data is encrypted and password protected, and the patient and/or guardian has been advised of the potential risks to privacy not withstanding such measures.    You have chosen to receive care through a telehealth visit.  Do you consent to use a video/audio connection for your medical care today? Yes    Patient identifiers utilized: Name and date of birth.    Patient verbally confirmed consent for today's encounter 4/27/23.    Subjective   Kamryn Gross is a 27 y.o. female who presents today for follow up    Chief Complaint: Medication management follow-up - Depression, anxiety, history of PTSD, history of nightmares, and history of hallucinations follow-up    Accompanied by: The patient reports she is alone at today's encounter    History of Present Illness:   The patient describes her mood as worsened since her last encounter with this APRN.  The patient reports she feels she is having more depression and anxiety symptoms over the past couple of months.  The patient reports she has lack of motivation, and has been sleeping a lot more recently.  The patient reports she feels she is sleeping a lot more because she is bored.  The patient reports she did recently apply for a  position at [employer], and should hear something about that interview by the end of this week.  The patient reports she has also noticed an increase in anger and irritability over the past couple of months, and her fiancé has also noticed this.  The patient rates her symptoms of depression at a 8/10 on a 0-10 scale, with 10 being the worst.  The patient rates her symptoms of anxiety at a 7/10 on a 0-10 scale, with 10 being the worst.  The patient reports her appetite as fair, and decreased some.  The patient reports her sleep as good, and reports probably too good because she feels she is sleeping too much.  The patient denies any nightmares, and reports no dreaming.  The patient denies any new medical problems or changes in medications since last appointment with this facility.  The patient reports compliance with her current medication regimen.  The patient denies any side effects or concerns from her current medication regimen.  The patient denies any auditory hallucinations or visual hallucinations.  The patient does not endorse any significant symptoms consistent with matt or psychosis at today's encounter.  The patient denies any suicidal or homicidal ideations, plans, or intent at today's encounter and is convincing.  The patient reports she would like to make some adjustments in her current psychotropic medication regimen at today's encounter.  The patient reports she would like to try stopping prazosin as she is not sure if she needs this medication anymore, she has been sleeping too much, and she denies any nightmares.  The patient reports she would like to try increasing her Trintellix at today's encounter if possible to help with her reported worsened depressive and anxious symptoms.  The patient reports her and her fiancé are also considering trying to conceive in the near future.        Primary Care Provider:  Eli Rajput      All Known Prior Psychiatric  Medications:  -Prozac  -Zoloft  -Trileptal  -Paxil  -Hydroxyzine  -Desvenlafaxine/Pristiq  -Prazosin  -Wellbutrin XL  -Abilify  -Trintellix      Last Menstrual Period:  1 week ago.  The patient was educated that her prescribed medications can have potential risk to a developing fetus. The patient is advised to contact this APRN/this office if she becomes pregnant or plans to become pregnant.  Pt verbalizes understanding and acknowledged agreement with this plan in her own words.        The following portions of the patient's history were reviewed and updated as appropriate: allergies, current medications, past family history, past medical history, past social history, past surgical history and problem list.          Past Medical History:  Past Medical History:   Diagnosis Date   • Anxiety    • Bipolar disorder    • Depression    • Headache    • Obesity    • Panic disorder    • PTSD (post-traumatic stress disorder)    • Self-injurious behavior    • Sleep apnea    • Suicide attempt     attempted overdose in 2017       Social History:  Social History     Socioeconomic History   • Marital status: Single   Tobacco Use   • Smoking status: Every Day     Packs/day: 0.50     Years: 8.00     Pack years: 4.00     Types: Cigarettes     Start date: 2014   • Smokeless tobacco: Never   Vaping Use   • Vaping Use: Never used   Substance and Sexual Activity   • Alcohol use: Not Currently   • Drug use: Not Currently     Types: Marijuana   • Sexual activity: Yes     Partners: Male     Birth control/protection: None       Family History:  Family History   Problem Relation Age of Onset   • Diabetes Mother    • Anxiety disorder Mother    • Depression Mother    • Anxiety disorder Father    • Depression Father    • Anxiety disorder Sister    • Depression Sister    • Depression Maternal Aunt    • Heart attack Paternal Aunt    • Anxiety disorder Paternal Aunt    • Depression Paternal Aunt    • No Known Problems Maternal Uncle    • No Known  Problems Paternal Uncle    • Lung disease Maternal Grandfather    • Alzheimer's disease Maternal Grandmother    • Lung cancer Paternal Grandfather    • Alzheimer's disease Paternal Grandmother    • Alcohol abuse Paternal Grandmother    • Anxiety disorder Sister    • Depression Sister    • Anxiety disorder Paternal Aunt    • Depression Paternal Aunt    • No Known Problems Maternal Uncle    • No Known Problems Paternal Uncle    • Heart attack Paternal Uncle    • Other Paternal Uncle        Past Surgical History:  Past Surgical History:   Procedure Laterality Date   • TONSILLECTOMY         Problem List:  Patient Active Problem List   Diagnosis   • Major depressive disorder, recurrent, moderate   • Class 3 severe obesity without serious comorbidity with body mass index (BMI) of 50.0 to 59.9 in adult   • Smoker   • Migraine without aura and without status migrainosus, not intractable   • Generalized anxiety disorder   • History of sleep apnea   • PTSD (post-traumatic stress disorder)   • Sleep apnea       Allergy:   No Known Allergies     Current Medications:   Current Outpatient Medications   Medication Sig Dispense Refill   • ARIPiprazole (ABILIFY) 15 MG tablet Take 0.5 tablets by mouth Daily. 15 tablet 0   • Vortioxetine HBr (Trintellix) 20 MG tablet Take 1 tablet by mouth Daily With Breakfast. 30 tablet 0   • SUMAtriptan (Imitrex) 25 MG tablet Take one tablet at onset of headache. May repeat dose one time in 2 hours if headache not relieved. 20 tablet 5     No current facility-administered medications for this visit.       Review of Symptoms:    Review of Systems   Psychiatric/Behavioral: Positive for decreased concentration, depressed mood and stress. Negative for behavioral problems, dysphoric mood, hallucinations, self-injury, suicidal ideas and negative for hyperactivity. The patient is nervous/anxious.          Physical Exam:   There were no vitals taken for this visit. There is no height or weight on file to  calculate BMI.   Due to the remote nature of this encounter (virtual encounter), vitals were unable to be obtained.  Height stated at 69 inches.  Weight stated at around 350 pounds.      Physical Exam  Neurological:      Mental Status: She is alert and oriented to person, place, and time.   Psychiatric:         Attention and Perception: Attention normal.         Mood and Affect: Mood is anxious and depressed. Affect is blunt.         Speech: Speech normal.         Behavior: Behavior normal. Behavior is cooperative.         Thought Content: Thought content normal. Thought content is not paranoid or delusional. Thought content does not include homicidal or suicidal ideation. Thought content does not include homicidal or suicidal plan.         Cognition and Memory: Cognition and memory normal.         Judgment: Judgment normal.           Mental Status Exam:   Hygiene:   good  Cooperation:  Cooperative  Eye Contact:  Good  Psychomotor Behavior:  Appropriate  Affect:  Blunted  Mood: depressed and anxious  Hopelessness: Denies  Speech:  Normal  Thought Process:  Linear   Thought Content:  Mood congruent  Suicidal:  None  Homicidal:  None  Hallucinations:  Not demonstrated today  Delusion:  None  Memory:  Intact  Orientation:  Person, Place, Time and Situation  Reliability:  good  Insight:  Good  Judgement:  Good  Impulse Control:  Good  Physical/Medical Issues:  No           Lab Results:   No visits with results within 1 Month(s) from this visit.   Latest known visit with results is:   Office Visit on 01/12/2022   Component Date Value Ref Range Status   • Glucose 01/12/2022 95  65 - 99 mg/dL Final   • BUN 01/12/2022 9  6 - 20 mg/dL Final   • Creatinine 01/12/2022 1.01 (H)  0.57 - 1.00 mg/dL Final   • Sodium 01/12/2022 142  136 - 145 mmol/L Final   • Potassium 01/12/2022 4.2  3.5 - 5.2 mmol/L Final   • Chloride 01/12/2022 106  98 - 107 mmol/L Final   • CO2 01/12/2022 27.3  22.0 - 29.0 mmol/L Final   • Calcium 01/12/2022  9.2  8.6 - 10.5 mg/dL Final   • Total Protein 01/12/2022 7.5  6.0 - 8.5 g/dL Final   • Albumin 01/12/2022 4.23  3.50 - 5.20 g/dL Final   • ALT (SGPT) 01/12/2022 32  1 - 33 U/L Final   • AST (SGOT) 01/12/2022 20  1 - 32 U/L Final   • Alkaline Phosphatase 01/12/2022 82  39 - 117 U/L Final   • Total Bilirubin 01/12/2022 0.3  0.0 - 1.2 mg/dL Final   • eGFR Non  Amer 01/12/2022 67  >60 mL/min/1.73 Final   • Globulin 01/12/2022 3.3  gm/dL Final   • A/G Ratio 01/12/2022 1.3  g/dL Final   • BUN/Creatinine Ratio 01/12/2022 8.9  7.0 - 25.0 Final   • Anion Gap 01/12/2022 8.7  5.0 - 15.0 mmol/L Final   • Total Cholesterol 01/12/2022 145  0 - 200 mg/dL Final   • Triglycerides 01/12/2022 100  0 - 150 mg/dL Final   • HDL Cholesterol 01/12/2022 33 (L)  40 - 60 mg/dL Final   • LDL Cholesterol  01/12/2022 93  0 - 100 mg/dL Final   • VLDL Cholesterol 01/12/2022 19  5 - 40 mg/dL Final   • LDL/HDL Ratio 01/12/2022 2.79   Final   • TSH 01/12/2022 2.790  0.270 - 4.200 uIU/mL Final   • Color 01/12/2022 Dark Yellow  Yellow, Straw, Dark Yellow, Kizzy Final   • Clarity, UA 01/12/2022 Clear  Clear Final   • Glucose, UA 01/12/2022 Negative  Negative, 1000 mg/dL (3+) mg/dL Final   • Bilirubin 01/12/2022 Negative  Negative Final   • Ketones, UA 01/12/2022 Negative  Negative Final   • Specific Gravity  01/12/2022 1.030  1.005 - 1.030 Final   • Blood, UA 01/12/2022 3+ (A)  Negative Final   • pH, Urine 01/12/2022 6.0  5.0 - 8.0 Final   • Protein, POC 01/12/2022 Trace (A)  Negative mg/dL Final   • Urobilinogen, UA 01/12/2022 Normal  Normal Final   • Leukocytes 01/12/2022 Negative  Negative Final   • Nitrite, UA 01/12/2022 Positive (A)  Negative Final   • WBC 01/12/2022 9.57  3.40 - 10.80 10*3/mm3 Final   • RBC 01/12/2022 5.31 (H)  3.77 - 5.28 10*6/mm3 Final   • Hemoglobin 01/12/2022 12.5  12.0 - 15.9 g/dL Final   • Hematocrit 01/12/2022 42.0  34.0 - 46.6 % Final   • MCV 01/12/2022 79.1  79.0 - 97.0 fL Final   • MCH 01/12/2022 23.5 (L)  26.6 -  33.0 pg Final   • MCHC 01/12/2022 29.8 (L)  31.5 - 35.7 g/dL Final   • RDW 01/12/2022 16.5 (H)  12.3 - 15.4 % Final   • RDW-SD 01/12/2022 46.5  37.0 - 54.0 fl Final   • MPV 01/12/2022 10.8  6.0 - 12.0 fL Final   • Platelets 01/12/2022 347  140 - 450 10*3/mm3 Final   • Neutrophil % 01/12/2022 58.5  42.7 - 76.0 % Final   • Lymphocyte % 01/12/2022 31.3  19.6 - 45.3 % Final   • Monocyte % 01/12/2022 6.4  5.0 - 12.0 % Final   • Eosinophil % 01/12/2022 3.4  0.3 - 6.2 % Final   • Basophil % 01/12/2022 0.2  0.0 - 1.5 % Final   • Immature Grans % 01/12/2022 0.2  0.0 - 0.5 % Final   • Neutrophils, Absolute 01/12/2022 5.59  1.70 - 7.00 10*3/mm3 Final   • Lymphocytes, Absolute 01/12/2022 3.00  0.70 - 3.10 10*3/mm3 Final   • Monocytes, Absolute 01/12/2022 0.61  0.10 - 0.90 10*3/mm3 Final   • Eosinophils, Absolute 01/12/2022 0.33  0.00 - 0.40 10*3/mm3 Final   • Basophils, Absolute 01/12/2022 0.02  0.00 - 0.20 10*3/mm3 Final   • Immature Grans, Absolute 01/12/2022 0.02  0.00 - 0.05 10*3/mm3 Final   • nRBC 01/12/2022 0.0  0.0 - 0.2 /100 WBC Final         Assessment & Plan   Problems Addressed this Visit    None  Visit Diagnoses     Moderate episode of recurrent major depressive disorder  (Chronic)   -  Primary    R/O MDD R/O mood disorder    Relevant Medications    Vortioxetine HBr (Trintellix) 20 MG tablet    ARIPiprazole (ABILIFY) 15 MG tablet    Anxiety disorder, unspecified type  (Chronic)       Relevant Medications    Vortioxetine HBr (Trintellix) 20 MG tablet    ARIPiprazole (ABILIFY) 15 MG tablet      Diagnoses       Codes Comments    Moderate episode of recurrent major depressive disorder    -  Primary ICD-10-CM: F33.1  ICD-9-CM: 296.32 R/O MDD R/O mood disorder    Anxiety disorder, unspecified type     ICD-10-CM: F41.9  ICD-9-CM: 300.00           Visit Diagnoses:    ICD-10-CM ICD-9-CM   1. Moderate episode of recurrent major depressive disorder  F33.1 296.32   2. Anxiety disorder, unspecified type  F41.9 300.00           GOALS:  Short Term Goals: Patient will be compliant with medication, and patient will have no significant medication related side effects.  Patient will be engaged in psychotherapy as indicated.  Patient will report subjective improvement of symptoms.  Long term goals: To stabilize mood and treat/improve subjective symptoms, the patient will stay out of the hospital, the patient will be at an optimal level of functioning, and the patient will take all medications as prescribed.  The patient verbalized understanding and agreement with goals that were mutually set.      TREATMENT PLAN: Restart supportive psychotherapy efforts and take medications as indicated.  The patient declines restarting psychotherapy at today's encounter.  Medication and treatment options, both pharmacological and non-pharmacological treatment options, discussed during today's visit, including any off label use of medication. Patient acknowledged and verbally consented with current treatment plan and was educated on the importance of compliance with treatment and follow-up appointments.      -Continue Abilify 7.5 mg by mouth once daily for mood.  -Discontinue prazosin at today's encounter.  -Increase Trintellix to 20 mg by mouth once daily for mood.  -Referral to begin psychotherapy placed at last encounter with patient's verbal request and consent.      MEDICATION ISSUES:  Discussed medication options and treatment plan of prescribed medication, any off label use of medication, as well as the risks, benefits, any black box warnings including increased suicidality, and side effects including but not limited to potential falls, dizziness, possible impaired driving, GI side effects (change in appetite, abdominal discomfort, nausea, vomiting, diarrhea, and/or constipation), dry mouth, somnolence, sedation, insomnia, activation, agitation, irritation, tremors, abnormal muscle movements or disorders, tardive dyskinesia, akathisia, asthenia,  headache, sweating, possible bruising or rare bleeding, electrolyte and/or fluid abnormalities, change in blood pressure/heart rate/and or heart rhythm, hypotension, sexual dysfunction, rare impulse control problems, rare seizures, rare neuroleptic malignant syndrome, increased risk of death and cerebrovascular events, change in blood glucose and increased risk for diabetes, change in triglycerides and cholesterol and increased risk for dyslipidemia,  weight gain, weight gain that can become problematic to health, skin conditions and reactions, and metabolic adversities among others. Patient and/or guardian are agreeable to call the office with any worsening of symptoms or onset of side effects, or if any concerns or questions arise.  The contact information for the office is made available to the patient and/or guardian. Patient and/or guardian are agreeable to call 911 or go to the nearest ER should they begin having any SI/HI, or if any urgent concerns arise.      VERBAL INFORMED CONSENT FOR MEDICATION:  The patient was educated that their proposed/prescribed psychotropic medication(s) has potential risks, side effects, adverse effects, and black box warnings; and these have been discussed with the patient.  The patient has been informed that their treatment and medication dosage is to be individualized, and may even be above or below the recommended range/dosage due to patient individualization and response, but medication is prescribed using a shared decision making approach, and no medication or dosage will be prescribed without the patient's verbal consent.  The reason for the use of the medication including any off label use and alternative modes of treatment other than or in addition to medication has been considered and discussed, the probable consequences of not receiving the proposed treatment have been discussed, and any treatment side effects, black box warnings, and cautions associated with treatment  have been discussed with the patient.  The patient is allowed ample time to openly discuss and ask questions regarding the proposed medication(s) and treatment plan and the patient verbalizes understanding the reasons for the use of the medication, its potential risks and benefits, other alternative treatment(s), and the probable consequences that may occur if the proposed medication is not given.  The patient has been given ample time to ask questions and study the information and find the information to be specific, accurate, and complete.  The patient gives verbal consent for the medication(s) proposed/prescribed, they verbalized understanding that they can refuse and withdraw consent at any time with the assistance of this APRN, and the patient has verbally confirmed that they are aware, and are willing, to take the prescribed medication and follow the treatment plan with the known possible risks, side effect, black box warnings, and any potential medication interactions, and the patient reports they will be worse off without this medication and treatment plan.  The patient is advised to contact this APRN/this office if any questions or concerns arise at any time (at [phone]), or call 911/go to the closest emergency department if needed or outside of office hours.      SUICIDE RISK ASSESSMENT AND SAFETY PLAN: Unalterable demographics and a history of mental health intervention indicate this patient is in a high risk category compared to the general population. At present, the patient denies active SI/HI, intentions, or plans at this time and agrees to seek immediate care should such thoughts develop. The patient verbalizes understanding of how to access emergency care if needed and agrees to do so. Consideration of suicide risk and protective factors such as history, current presentation, individual strengths and weaknesses, psychosocial and environmental stressors and variables, psychiatric illness and  symptoms, medical conditions and pain, took place in this interview. Based on those considerations, the patient is determined: within individual baseline and presenting no imminent risk for suicide or homicide. Other recommendations: The patient does not meet the criteria for inpatient admission and is not a safety risk to self or others at today's visit. Inpatient treatment offers no significant advantages over outpatient treatment for this patient at today's visit.  The patient was given ample time for questions and fully participated in treatment planning.  The patient was encouraged to call the clinic with any questions or concerns.  The patient was informed of access to emergency care. If patient were to develop any significant symptomatology, suicidal ideation, homicidal ideation, any concerns, or feel unsafe at any time they are to call the clinic and if unable to get immediate assistance should immediately call 911 or go to the nearest emergency room.  Patient contracted verbally for the following: If you are experiencing an emotional crisis or have thoughts of harming yourself or others, please go to your nearest local emergency room or call 911. Will continue to re-assess medication response and side effects frequently to establish efficacy and ensure safety. Risks, any black box warnings, side effects, off label usage, and benefits of medication and treatment discussed with patient, along with potential adverse side effects of current and/or newly prescribed medication, alternative treatment options, and OTC medications.  Patient verbalized understanding of potential risks, any off label use of medication, any black box warnings, and any side effects in their own words. The patient verbalized understanding and agreed to comply with the safety plan discussed in their own words.  Patient given the number to the office. Number also discussed of the 24- hour suicide hotline.         MEDS ORDERED DURING  VISIT:  New Medications Ordered This Visit   Medications   • Vortioxetine HBr (Trintellix) 20 MG tablet     Sig: Take 1 tablet by mouth Daily With Breakfast.     Dispense:  30 tablet     Refill:  0   • ARIPiprazole (ABILIFY) 15 MG tablet     Sig: Take 0.5 tablets by mouth Daily.     Dispense:  15 tablet     Refill:  0       Return in about 2 weeks (around 5/11/2023), or if symptoms worsen or fail to improve, for Next scheduled follow up and Recheck.         Functional Status: Moderate impairment     Prognosis: Fair with Ongoing Treatment             This document has been electronically signed by ELISE Dye  April 27, 2023 09:17 EDT    Some of the data in this electronic note has been brought forward from a previous encounter, any necessary changes have been made, it has been reviewed by this APRN, and it is accurate.    Please note that portions of this note were completed with a voice recognition program.

## 2023-05-11 ENCOUNTER — TELEMEDICINE (OUTPATIENT)
Dept: PSYCHIATRY | Facility: CLINIC | Age: 28
End: 2023-05-11
Payer: COMMERCIAL

## 2023-05-11 DIAGNOSIS — F41.9 ANXIETY DISORDER, UNSPECIFIED TYPE: Chronic | ICD-10-CM

## 2023-05-11 DIAGNOSIS — F33.1 MODERATE EPISODE OF RECURRENT MAJOR DEPRESSIVE DISORDER: Primary | Chronic | ICD-10-CM

## 2023-05-11 DIAGNOSIS — G47.9 SLEEPING DIFFICULTIES: ICD-10-CM

## 2023-05-11 RX ORDER — CIPROFLOXACIN 500 MG/1
TABLET, FILM COATED ORAL
COMMUNITY
Start: 2023-05-08

## 2023-05-11 RX ORDER — ARIPIPRAZOLE 5 MG/1
TABLET ORAL
Qty: 8 TABLET | Refills: 0 | Status: SHIPPED | OUTPATIENT
Start: 2023-05-11 | End: 2023-05-21

## 2023-05-11 RX ORDER — LAMOTRIGINE 25 MG/1
25 TABLET ORAL DAILY
Qty: 30 TABLET | Refills: 0 | Status: SHIPPED | OUTPATIENT
Start: 2023-05-11

## 2023-05-11 RX ORDER — VORTIOXETINE 20 MG/1
20 TABLET, FILM COATED ORAL
Qty: 30 TABLET | Refills: 0 | Status: SHIPPED | OUTPATIENT
Start: 2023-05-11

## 2023-05-11 RX ORDER — LOSARTAN POTASSIUM 25 MG/1
25 TABLET ORAL DAILY
COMMUNITY
Start: 2023-05-02

## 2023-05-11 NOTE — PROGRESS NOTES
This provider is located at the [facility] (through [facility]), [address], [place], [address] using a secure Photo[employer] Video Visit through [employer]. Patient is being seen remotely via telehealth at their home address in [place], and stated they are in a secure environment for this session. The patient's condition being diagnosed/treated is appropriate for telemedicine. The provider identified herself as well as her credentials.   The patient, and/or patients guardian, consent to be seen remotely, and when consent is given they understand that the consent allows for patient identifiable information to be sent to a third party as needed.   They may refuse to be seen remotely at any time. The electronic data is encrypted and password protected, and the patient and/or guardian has been advised of the potential risks to privacy not withstanding such measures.    You have chosen to receive care through a telehealth visit.  Do you consent to use a video/audio connection for your medical care today? Yes    Patient identifiers utilized: Name and date of birth.    Patient verbally confirmed consent for today's encounter 5/11/2023.    Subjective   Kamryn Gross is a 27 y.o. female who presents today for follow up    Chief Complaint: Medication management follow-up - Depression, anxiety, history of PTSD, history of nightmares, and history of hallucinations follow-up    Accompanied by: The patient reports she is alone at today's encounter    History of Present Illness:   The patient describes her mood as about the same since her last encounter with this APRN, and only minimally improved with the recent increase in Trintellix.  The patient reports she is still looking for a job, and has an interview later today that she does not plan to go to because she feels the job will be too difficult and stressful.  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  The patient rates her symptoms of depression at a 5/10 on a 0-10 scale, with 10 being the worst.  The patient rates her symptoms of anxiety at a 8/10 on a 0-10 scale, with 10 being the worst.  The patient reports she finds herself being more irritable overall.  The patient reports her appetite as good, and reports she is trying to eat healthier and lose weight.  The patient reports her sleep as decreased, and reports difficulty sleeping at night.  The patient reports she does find herself tired in the daytime, and will often times take a 2-hour nap in the afternoons.  The patient denies any recent nightmares.  The patient reports she is currently on antibiotics for a urinary tract infection, and was recently started on blood pressure medication by her primary care provider for elevated blood pressure/hypertension.  The patient denies any other new medical problems or changes in medications since last appointment with this facility.  The patient reports compliance with her current medication regimen.  The patient denies any side effects or concerns from her current medication regimen.  The patient denies any symptoms of EPS or TD.  The patient denies any auditory hallucinations or visual hallucinations.  The patient does not endorse any significant symptoms consistent with matt or psychosis at today's encounter.  The patient denies any suicidal or homicidal ideations, plans, or intent at today's encounter and is convincing.  The patient reports she would like to make changes in her current psychotropic medication regimen at today's encounter to help with her continued worsened moods.  The patient reports she does not feel Abilify has been beneficial in improving her mood since she has been taking it.        Primary Care Provider:  Eli Rajput      All Known Prior Psychiatric  Medications:  -Prozac  -Zoloft  -Trileptal  -Paxil  -Hydroxyzine  -Desvenlafaxine/Pristiq  -Prazosin  -Wellbutrin XL  -Abilify  -Trintellix      Last Menstrual Period:  3 weeks ago.  The patient was educated that her prescribed medications can have potential risk to a developing fetus. The patient is advised to contact this APRN/this office if she becomes pregnant or plans to become pregnant.  Pt verbalizes understanding and acknowledged agreement with this plan in her own words.        The following portions of the patient's history were reviewed and updated as appropriate: allergies, current medications, past family history, past medical history, past social history, past surgical history and problem list.          Past Medical History:  Past Medical History:   Diagnosis Date   • Anxiety    • Bipolar disorder    • Depression    • Headache    • Hypertension    • Obesity    • Panic disorder    • PTSD (post-traumatic stress disorder)    • Self-injurious behavior    • Sleep apnea    • Suicide attempt     attempted overdose in 2017       Social History:  Social History     Socioeconomic History   • Marital status: Single   Tobacco Use   • Smoking status: Every Day     Packs/day: 0.50     Years: 8.00     Pack years: 4.00     Types: Cigarettes     Start date: 2014   • Smokeless tobacco: Never   Vaping Use   • Vaping Use: Never used   Substance and Sexual Activity   • Alcohol use: Not Currently   • Drug use: Not Currently     Types: Marijuana   • Sexual activity: Yes     Partners: Male     Birth control/protection: None       Family History:  Family History   Problem Relation Age of Onset   • Diabetes Mother    • Anxiety disorder Mother    • Depression Mother    • Anxiety disorder Father    • Depression Father    • Anxiety disorder Sister    • Depression Sister    • Depression Maternal Aunt    • Heart attack Paternal Aunt    • Anxiety disorder Paternal Aunt    • Depression Paternal Aunt    • No Known Problems Maternal  Uncle    • No Known Problems Paternal Uncle    • Lung disease Maternal Grandfather    • Alzheimer's disease Maternal Grandmother    • Lung cancer Paternal Grandfather    • Alzheimer's disease Paternal Grandmother    • Alcohol abuse Paternal Grandmother    • Anxiety disorder Sister    • Depression Sister    • Anxiety disorder Paternal Aunt    • Depression Paternal Aunt    • No Known Problems Maternal Uncle    • No Known Problems Paternal Uncle    • Heart attack Paternal Uncle    • Other Paternal Uncle        Past Surgical History:  Past Surgical History:   Procedure Laterality Date   • TONSILLECTOMY         Problem List:  Patient Active Problem List   Diagnosis   • Major depressive disorder, recurrent, moderate   • Class 3 severe obesity without serious comorbidity with body mass index (BMI) of 50.0 to 59.9 in adult   • Smoker   • Migraine without aura and without status migrainosus, not intractable   • Generalized anxiety disorder   • History of sleep apnea   • PTSD (post-traumatic stress disorder)   • Sleep apnea       Allergy:   No Known Allergies     Current Medications:   Current Outpatient Medications   Medication Sig Dispense Refill   • ARIPiprazole (ABILIFY) 5 MG tablet Take 1 tablet by mouth Daily for 5 days, THEN 0.5 tablets Daily for 5 days. Then completely stop taking. 8 tablet 0   • Vortioxetine HBr (Trintellix) 20 MG tablet Take 1 tablet by mouth Daily With Breakfast. 30 tablet 0   • ciprofloxacin (CIPRO) 500 MG tablet TAKE ONE TABLET BY MOUTH EVERY 12 HOURS UNTIL GONE FOR INFECTION     • lamoTRIgine (LaMICtal) 25 MG tablet Take 1 tablet by mouth Daily. 30 tablet 0   • losartan (COZAAR) 25 MG tablet Take 1 tablet by mouth Daily. for blood pressure     • SUMAtriptan (Imitrex) 25 MG tablet Take one tablet at onset of headache. May repeat dose one time in 2 hours if headache not relieved. 20 tablet 5     No current facility-administered medications for this visit.       Review of Symptoms:    Review of  Systems   Psychiatric/Behavioral: Positive for sleep disturbance, depressed mood and stress. Negative for behavioral problems, dysphoric mood, hallucinations, self-injury, suicidal ideas and negative for hyperactivity. The patient is nervous/anxious.          Physical Exam:   There were no vitals taken for this visit. There is no height or weight on file to calculate BMI.   Due to the remote nature of this encounter (virtual encounter), vitals were unable to be obtained.  Height stated at 69 inches.  Weight stated at around 349 pounds.      Physical Exam  Neurological:      Mental Status: She is alert and oriented to person, place, and time.   Psychiatric:         Attention and Perception: Attention normal.         Mood and Affect: Mood is anxious and depressed. Affect is blunt.         Speech: Speech normal.         Behavior: Behavior normal. Behavior is cooperative.         Thought Content: Thought content normal. Thought content is not paranoid or delusional. Thought content does not include homicidal or suicidal ideation. Thought content does not include homicidal or suicidal plan.         Cognition and Memory: Cognition and memory normal.         Judgment: Judgment normal.           Mental Status Exam:   Hygiene:   good  Cooperation:  Cooperative  Eye Contact:  Good  Psychomotor Behavior:  Appropriate  Affect:  Blunted  Mood: depressed and anxious  Hopelessness: Denies  Speech:  Normal  Thought Process:  Linear   Thought Content:  Mood congruent  Suicidal:  None  Homicidal:  None  Hallucinations:  Not demonstrated today  Delusion:  None  Memory:  Intact  Orientation:  Person, Place, Time and Situation  Reliability:  good  Insight:  Good  Judgement:  Good  Impulse Control:  Good  Physical/Medical Issues:  No           Lab Results:   No visits with results within 1 Month(s) from this visit.   Latest known visit with results is:   Office Visit on 01/12/2022   Component Date Value Ref Range Status   • Glucose  01/12/2022 95  65 - 99 mg/dL Final   • BUN 01/12/2022 9  6 - 20 mg/dL Final   • Creatinine 01/12/2022 1.01 (H)  0.57 - 1.00 mg/dL Final   • Sodium 01/12/2022 142  136 - 145 mmol/L Final   • Potassium 01/12/2022 4.2  3.5 - 5.2 mmol/L Final   • Chloride 01/12/2022 106  98 - 107 mmol/L Final   • CO2 01/12/2022 27.3  22.0 - 29.0 mmol/L Final   • Calcium 01/12/2022 9.2  8.6 - 10.5 mg/dL Final   • Total Protein 01/12/2022 7.5  6.0 - 8.5 g/dL Final   • Albumin 01/12/2022 4.23  3.50 - 5.20 g/dL Final   • ALT (SGPT) 01/12/2022 32  1 - 33 U/L Final   • AST (SGOT) 01/12/2022 20  1 - 32 U/L Final   • Alkaline Phosphatase 01/12/2022 82  39 - 117 U/L Final   • Total Bilirubin 01/12/2022 0.3  0.0 - 1.2 mg/dL Final   • eGFR Non  Amer 01/12/2022 67  >60 mL/min/1.73 Final   • Globulin 01/12/2022 3.3  gm/dL Final   • A/G Ratio 01/12/2022 1.3  g/dL Final   • BUN/Creatinine Ratio 01/12/2022 8.9  7.0 - 25.0 Final   • Anion Gap 01/12/2022 8.7  5.0 - 15.0 mmol/L Final   • Total Cholesterol 01/12/2022 145  0 - 200 mg/dL Final   • Triglycerides 01/12/2022 100  0 - 150 mg/dL Final   • HDL Cholesterol 01/12/2022 33 (L)  40 - 60 mg/dL Final   • LDL Cholesterol  01/12/2022 93  0 - 100 mg/dL Final   • VLDL Cholesterol 01/12/2022 19  5 - 40 mg/dL Final   • LDL/HDL Ratio 01/12/2022 2.79   Final   • TSH 01/12/2022 2.790  0.270 - 4.200 uIU/mL Final   • Color 01/12/2022 Dark Yellow  Yellow, Straw, Dark Yellow, Kizzy Final   • Clarity, UA 01/12/2022 Clear  Clear Final   • Glucose, UA 01/12/2022 Negative  Negative, 1000 mg/dL (3+) mg/dL Final   • Bilirubin 01/12/2022 Negative  Negative Final   • Ketones, UA 01/12/2022 Negative  Negative Final   • Specific Gravity  01/12/2022 1.030  1.005 - 1.030 Final   • Blood, UA 01/12/2022 3+ (A)  Negative Final   • pH, Urine 01/12/2022 6.0  5.0 - 8.0 Final   • Protein, POC 01/12/2022 Trace (A)  Negative mg/dL Final   • Urobilinogen, UA 01/12/2022 Normal  Normal Final   • Leukocytes 01/12/2022 Negative   Negative Final   • Nitrite, UA 01/12/2022 Positive (A)  Negative Final   • WBC 01/12/2022 9.57  3.40 - 10.80 10*3/mm3 Final   • RBC 01/12/2022 5.31 (H)  3.77 - 5.28 10*6/mm3 Final   • Hemoglobin 01/12/2022 12.5  12.0 - 15.9 g/dL Final   • Hematocrit 01/12/2022 42.0  34.0 - 46.6 % Final   • MCV 01/12/2022 79.1  79.0 - 97.0 fL Final   • MCH 01/12/2022 23.5 (L)  26.6 - 33.0 pg Final   • MCHC 01/12/2022 29.8 (L)  31.5 - 35.7 g/dL Final   • RDW 01/12/2022 16.5 (H)  12.3 - 15.4 % Final   • RDW-SD 01/12/2022 46.5  37.0 - 54.0 fl Final   • MPV 01/12/2022 10.8  6.0 - 12.0 fL Final   • Platelets 01/12/2022 347  140 - 450 10*3/mm3 Final   • Neutrophil % 01/12/2022 58.5  42.7 - 76.0 % Final   • Lymphocyte % 01/12/2022 31.3  19.6 - 45.3 % Final   • Monocyte % 01/12/2022 6.4  5.0 - 12.0 % Final   • Eosinophil % 01/12/2022 3.4  0.3 - 6.2 % Final   • Basophil % 01/12/2022 0.2  0.0 - 1.5 % Final   • Immature Grans % 01/12/2022 0.2  0.0 - 0.5 % Final   • Neutrophils, Absolute 01/12/2022 5.59  1.70 - 7.00 10*3/mm3 Final   • Lymphocytes, Absolute 01/12/2022 3.00  0.70 - 3.10 10*3/mm3 Final   • Monocytes, Absolute 01/12/2022 0.61  0.10 - 0.90 10*3/mm3 Final   • Eosinophils, Absolute 01/12/2022 0.33  0.00 - 0.40 10*3/mm3 Final   • Basophils, Absolute 01/12/2022 0.02  0.00 - 0.20 10*3/mm3 Final   • Immature Grans, Absolute 01/12/2022 0.02  0.00 - 0.05 10*3/mm3 Final   • nRBC 01/12/2022 0.0  0.0 - 0.2 /100 WBC Final         Assessment & Plan   Problems Addressed this Visit    None  Visit Diagnoses     Moderate episode of recurrent major depressive disorder  (Chronic)   -  Primary    R/O MDD R/O mood disorder    Relevant Medications    ARIPiprazole (ABILIFY) 5 MG tablet    Vortioxetine HBr (Trintellix) 20 MG tablet    lamoTRIgine (LaMICtal) 25 MG tablet    Anxiety disorder, unspecified type  (Chronic)       Relevant Medications    ARIPiprazole (ABILIFY) 5 MG tablet    Vortioxetine HBr (Trintellix) 20 MG tablet    Sleeping difficulties           Diagnoses       Codes Comments    Moderate episode of recurrent major depressive disorder    -  Primary ICD-10-CM: F33.1  ICD-9-CM: 296.32 R/O MDD R/O mood disorder    Anxiety disorder, unspecified type     ICD-10-CM: F41.9  ICD-9-CM: 300.00     Sleeping difficulties     ICD-10-CM: G47.9  ICD-9-CM: 780.50           Visit Diagnoses:    ICD-10-CM ICD-9-CM   1. Moderate episode of recurrent major depressive disorder  F33.1 296.32   2. Anxiety disorder, unspecified type  F41.9 300.00   3. Sleeping difficulties  G47.9 780.50          GOALS:  Short Term Goals: Patient will be compliant with medication, and patient will have no significant medication related side effects.  Patient will be engaged in psychotherapy as indicated.  Patient will report subjective improvement of symptoms.  Long term goals: To stabilize mood and treat/improve subjective symptoms, the patient will stay out of the hospital, the patient will be at an optimal level of functioning, and the patient will take all medications as prescribed.  The patient verbalized understanding and agreement with goals that were mutually set.      TREATMENT PLAN: Restart supportive psychotherapy efforts and take medications as indicated.  The patient declines restarting psychotherapy at today's encounter.  Medication and treatment options, both pharmacological and non-pharmacological treatment options, discussed during today's visit, including any off label use of medication. Patient acknowledged and verbally consented with current treatment plan and was educated on the importance of compliance with treatment and follow-up appointments.      -Taper and discontinue Abilify by taking Abilify 5 mg by mouth once daily for 5 days, then take Abilify 2.5 mg by mouth once daily for 5 days, then completely stop taking Abilify..  -Continue Trintellix 20 mg by mouth once daily for mood.  -Start Lamictal 25 mg by mouth once daily for mood.        MEDICATION ISSUES:  Discussed  medication options and treatment plan of prescribed medication, any off label use of medication, as well as the risks, benefits, any black box warnings including increased suicidality, and side effects including but not limited to potential falls, dizziness, possible impaired driving, GI side effects (change in appetite, abdominal discomfort, nausea, vomiting, diarrhea, and/or constipation), dry mouth, somnolence, sedation, insomnia, activation, agitation, irritation, tremors, abnormal muscle movements or disorders, headache, sweating, possible bruising or rare bleeding, electrolyte and/or fluid abnormalities, change in blood pressure/heart rate/and or heart rhythm, sexual dysfunction, and metabolic adversities among others. Patient and/or guardian agreeable to call the office with any worsening of symptoms or onset of side effects, or if any concerns or questions arise.  The contact information for the office is made available to the patient and/or guardian.  Patient and/or guardian agreeable to call 911 or go to the nearest ER should they begin having any SI/HI, or if any urgent concerns arise.    This APRN has discussed the benefits and risks of starting Lamictal (Lamotrigine).  The side effects of Lamictal can include a benign rash, blurred or double vision, dizziness, ataxia, sedation, headache, tremor, insomnia, poor coordination, fatigue,  nausea, vomiting, dyspepsia, rhinitis, infection, pharyngitis, asthenia, a rare but serious rash, rare multi-organ failure associated with Vazquez-Aroldo Syndrome, toxic epidermal necrolysis, drug hypersensitivity syndrome, rare blood dyscrasias, rare aseptic meningitis, rare sudden unexplained deaths in people with epilepsy, withdrawal seizures upon abrupt withdrawal, and rare activation of suicidal ideation and behavior (suicidality).  This APRN has discussed with the patient that a very slow dose titration when starting Lamictal may reduce the incidence of skin rash and  other side effects.  The dosage should not be titrated upwards or increased faster than recommended due to the possibility of the discussed side effects and risk of development of a skin rash (which can become life threatening).    This APRN has also discussed with the patient that if the patient stops taking the Lamictal for 3-5 days or longer, it will be necessary to restart the drug with the initial dose titration, as rashes have been reported on reexposure.    This APRN has also discussed with the patient that the patient should avoid new medications, foods, or products during the first 3 months of Lamictal treatment in order to decrease the risk of unrelated rash.  The patient should also not start Lamictal within 2 weeks of a viral infection, a rash, or a vaccination.  Also, if the patient and Provider decide to stop the Lamictal, the patient will follow the directions of this APRN/this office as a guided taper over about two weeks is appropriate due to the risk of relapse in mood or bipolar disorder, the risk of seizures in those with epilepsy, and discontinuation symptoms upon rapid discontinuation of Lamictal.    The patient verbalizes understanding of benefits and risks as discussed, the patient feels the benefits outweigh the risks and is agreeable to start Lamictal via a slow titration schedule as discussed.  The patient is advised should any side effects or rash develops they are to stop the Lamictal immediately and contact this APRN/this office or go to the emergency department immediately.  The patient verbalizes understanding and agreement with treatment plan in their own words.      VERBAL INFORMED CONSENT FOR MEDICATION:  The patient was educated that their proposed/prescribed psychotropic medication(s) has potential risks, side effects, adverse effects, and black box warnings; and these have been discussed with the patient.  The patient has been informed that their treatment and medication dosage  is to be individualized, and may even be above or below the recommended range/dosage due to patient individualization and response, but medication is prescribed using a shared decision making approach, and no medication or dosage will be prescribed without the patient's verbal consent.  The reason for the use of the medication including any off label use and alternative modes of treatment other than or in addition to medication has been considered and discussed, the probable consequences of not receiving the proposed treatment have been discussed, and any treatment side effects, black box warnings, and cautions associated with treatment have been discussed with the patient.  The patient is allowed ample time to openly discuss and ask questions regarding the proposed medication(s) and treatment plan and the patient verbalizes understanding the reasons for the use of the medication, its potential risks and benefits, other alternative treatment(s), and the probable consequences that may occur if the proposed medication is not given.  The patient has been given ample time to ask questions and study the information and find the information to be specific, accurate, and complete.  The patient gives verbal consent for the medication(s) proposed/prescribed, they verbalized understanding that they can refuse and withdraw consent at any time with the assistance of this APRN, and the patient has verbally confirmed that they are aware, and are willing, to take the prescribed medication and follow the treatment plan with the known possible risks, side effect, black box warnings, and any potential medication interactions, and the patient reports they will be worse off without this medication and treatment plan.  The patient is advised to contact this APRN/this office if any questions or concerns arise at any time (at [phone]), or call 911/go to the closest emergency department if needed or outside of office  hours.      SUICIDE RISK ASSESSMENT AND SAFETY PLAN: Unalterable demographics and a history of mental health intervention indicate this patient is in a high risk category compared to the general population. At present, the patient denies active SI/HI, intentions, or plans at this time and agrees to seek immediate care should such thoughts develop. The patient verbalizes understanding of how to access emergency care if needed and agrees to do so. Consideration of suicide risk and protective factors such as history, current presentation, individual strengths and weaknesses, psychosocial and environmental stressors and variables, psychiatric illness and symptoms, medical conditions and pain, took place in this interview. Based on those considerations, the patient is determined: within individual baseline and presenting no imminent risk for suicide or homicide. Other recommendations: The patient does not meet the criteria for inpatient admission and is not a safety risk to self or others at today's visit. Inpatient treatment offers no significant advantages over outpatient treatment for this patient at today's visit.  The patient was given ample time for questions and fully participated in treatment planning.  The patient was encouraged to call the clinic with any questions or concerns.  The patient was informed of access to emergency care. If patient were to develop any significant symptomatology, suicidal ideation, homicidal ideation, any concerns, or feel unsafe at any time they are to call the clinic and if unable to get immediate assistance should immediately call 911 or go to the nearest emergency room.  Patient contracted verbally for the following: If you are experiencing an emotional crisis or have thoughts of harming yourself or others, please go to your nearest local emergency room or call 911. Will continue to re-assess medication response and side effects frequently to establish efficacy and ensure safety.  Risks, any black box warnings, side effects, off label usage, and benefits of medication and treatment discussed with patient, along with potential adverse side effects of current and/or newly prescribed medication, alternative treatment options, and OTC medications.  Patient verbalized understanding of potential risks, any off label use of medication, any black box warnings, and any side effects in their own words. The patient verbalized understanding and agreed to comply with the safety plan discussed in their own words.  Patient given the number to the office. Number also discussed of the 24- hour suicide hotline.         MEDS ORDERED DURING VISIT:  New Medications Ordered This Visit   Medications   • ARIPiprazole (ABILIFY) 5 MG tablet     Sig: Take 1 tablet by mouth Daily for 5 days, THEN 0.5 tablets Daily for 5 days. Then completely stop taking.     Dispense:  8 tablet     Refill:  0   • Vortioxetine HBr (Trintellix) 20 MG tablet     Sig: Take 1 tablet by mouth Daily With Breakfast.     Dispense:  30 tablet     Refill:  0   • lamoTRIgine (LaMICtal) 25 MG tablet     Sig: Take 1 tablet by mouth Daily.     Dispense:  30 tablet     Refill:  0       Return in about 3 weeks (around 6/1/2023), or if symptoms worsen or fail to improve, for Next scheduled follow up and Recheck.         Functional Status: Moderate impairment     Prognosis: Fair with Ongoing Treatment             This document has been electronically signed by ELISE Dye  May 11, 2023 10:00 EDT    Some of the data in this electronic note has been brought forward from a previous encounter, any necessary changes have been made, it has been reviewed by this APRN, and it is accurate.    Please note that portions of this note were completed with a voice recognition program.

## 2023-05-24 ENCOUNTER — TELEPHONE (OUTPATIENT)
Dept: UROLOGY | Facility: CLINIC | Age: 28
End: 2023-05-24

## 2023-05-24 NOTE — TELEPHONE ENCOUNTER
Provider: BIRD NAVA    Caller: Kamryn Gross    Relationship to Patient: Self     Phone Number: [phone]    Reason for Call: RESCHEDULE APPOINTMENT    When was the patient last seen: NEW PATIENT    Notes: PATIENT IS UNABLE TO MAKE TODAY'S APPOINTMENT (05/24/23 @ 10:30 AM) DUE TO NOT HAVING TRANSPORTATION. MOVED APPOINTMENT TO 06/12/23 @ 2:30 PM.

## 2023-06-12 ENCOUNTER — OFFICE VISIT (OUTPATIENT)
Dept: UROLOGY | Facility: CLINIC | Age: 28
End: 2023-06-12
Payer: COMMERCIAL

## 2023-06-12 VITALS
BODY MASS INDEX: 43.4 KG/M2 | SYSTOLIC BLOOD PRESSURE: 134 MMHG | WEIGHT: 293 LBS | HEIGHT: 69 IN | HEART RATE: 91 BPM | DIASTOLIC BLOOD PRESSURE: 80 MMHG

## 2023-06-12 DIAGNOSIS — N39.0 RECURRENT UTI (URINARY TRACT INFECTION): Primary | ICD-10-CM

## 2023-06-12 DIAGNOSIS — N39.46 MIXED STRESS AND URGE INCONTINENCE: ICD-10-CM

## 2023-06-12 DIAGNOSIS — R35.0 FREQUENCY OF URINATION: ICD-10-CM

## 2023-06-12 LAB
BILIRUB BLD-MCNC: NEGATIVE MG/DL
CLARITY, POC: ABNORMAL
COLOR UR: YELLOW
EXPIRATION DATE: ABNORMAL
GLUCOSE UR STRIP-MCNC: NEGATIVE MG/DL
KETONES UR QL: NEGATIVE
LEUKOCYTE EST, POC: NEGATIVE
Lab: ABNORMAL
NITRITE UR-MCNC: POSITIVE MG/ML
PH UR: 8 [PH] (ref 5–8)
PROT UR STRIP-MCNC: NEGATIVE MG/DL
RBC # UR STRIP: NEGATIVE /UL
SP GR UR: 1.01 (ref 1–1.03)
UROBILINOGEN UR QL: NORMAL

## 2023-06-12 PROCEDURE — 51798 US URINE CAPACITY MEASURE: CPT

## 2023-06-12 PROCEDURE — 87077 CULTURE AEROBIC IDENTIFY: CPT

## 2023-06-12 PROCEDURE — 99203 OFFICE O/P NEW LOW 30 MIN: CPT

## 2023-06-12 PROCEDURE — 1159F MED LIST DOCD IN RCRD: CPT

## 2023-06-12 PROCEDURE — 87186 SC STD MICRODIL/AGAR DIL: CPT

## 2023-06-12 PROCEDURE — 87086 URINE CULTURE/COLONY COUNT: CPT

## 2023-06-12 PROCEDURE — 1160F RVW MEDS BY RX/DR IN RCRD: CPT

## 2023-06-12 RX ORDER — NITROFURANTOIN 25; 75 MG/1; MG/1
CAPSULE ORAL
Qty: 56 CAPSULE | Refills: 1 | Status: SHIPPED | OUTPATIENT
Start: 2023-06-12

## 2023-06-12 NOTE — PROGRESS NOTES
"Chief Complaint:    Chief Complaint   Patient presents with   • Urinary Incontinence   • Recurrent uti's     New patient        Vital Signs:   /80   Pulse 91   Ht 175.3 cm (69\")   Wt (!) 187 kg (412 lb 6.4 oz)   BMI 60.90 kg/m²   Body mass index is 60.9 kg/m².      HPI:  Kamryn Gross is a 27 y.o. female who presents today for initial evaluation     History of Present Illness  Ms. Landry presents to the clinic for follow-up for urinary incontinence and recurrent UTIs.  Patient has a past medical history significant for hypertension, BMI greater than 60, PTSD, sleep apnea, and depression/anxiety.  Patient reports that she has had 3-4 recurrent UTIs over the past year.  Her last for urinary analysis have showed positive nitrites with no leukocytes or blood.  UA today in office does show positive nitrites.  She does complain of dysuria, pelvic pain, frequency, urgency, back pain, and constipation.  She has tried a stool softener in the past with no improvement of bowel movements.  States that she uses roughly 1-2 pads per day.  States that it occurs with urgency and stress.  She denies any previous pregnancies.      Past Medical History:  Past Medical History:   Diagnosis Date   • Anxiety    • Bipolar disorder    • Depression    • Headache    • Hypertension    • Obesity    • Panic disorder    • PTSD (post-traumatic stress disorder)    • Self-injurious behavior    • Sleep apnea    • Suicide attempt     attempted overdose in 2017       Current Meds:  Current Outpatient Medications   Medication Sig Dispense Refill   • lamoTRIgine (LaMICtal) 25 MG tablet Take 1 tablet by mouth Daily. 30 tablet 0   • losartan (COZAAR) 25 MG tablet Take 1 tablet by mouth Daily. for blood pressure     • SUMAtriptan (Imitrex) 25 MG tablet Take one tablet at onset of headache. May repeat dose one time in 2 hours if headache not relieved. 20 tablet 5   • Vortioxetine HBr (Trintellix) 20 MG tablet Take 1 tablet by mouth Daily With " Breakfast. 30 tablet 0   • nitrofurantoin, macrocrystal-monohydrate, (Macrobid) 100 MG capsule Take 1 tablet by mouth twice daily for 5 days; then 1 tablet by mouth nightly 56 capsule 1     No current facility-administered medications for this visit.        Allergies:   No Known Allergies     Past Surgical History:  Past Surgical History:   Procedure Laterality Date   • TONSILLECTOMY         Social History:  Social History     Socioeconomic History   • Marital status: Single   Tobacco Use   • Smoking status: Every Day     Packs/day: 0.50     Years: 8.00     Pack years: 4.00     Types: Cigarettes     Start date: 2014   • Smokeless tobacco: Never   Vaping Use   • Vaping Use: Never used   Substance and Sexual Activity   • Alcohol use: Not Currently   • Drug use: Not Currently     Types: Marijuana   • Sexual activity: Yes     Partners: Male     Birth control/protection: None       Family History:  Family History   Problem Relation Age of Onset   • Diabetes Mother    • Anxiety disorder Mother    • Depression Mother    • Anxiety disorder Father    • Depression Father    • Anxiety disorder Sister    • Depression Sister    • Depression Maternal Aunt    • Heart attack Paternal Aunt    • Anxiety disorder Paternal Aunt    • Depression Paternal Aunt    • No Known Problems Maternal Uncle    • No Known Problems Paternal Uncle    • Lung disease Maternal Grandfather    • Alzheimer's disease Maternal Grandmother    • Lung cancer Paternal Grandfather    • Alzheimer's disease Paternal Grandmother    • Alcohol abuse Paternal Grandmother    • Anxiety disorder Sister    • Depression Sister    • Anxiety disorder Paternal Aunt    • Depression Paternal Aunt    • No Known Problems Maternal Uncle    • No Known Problems Paternal Uncle    • Heart attack Paternal Uncle    • Other Paternal Uncle        Review of Systems:  Review of Systems   Constitutional:  Positive for fatigue. Negative for chills and fever.   HENT:  Negative for congestion and  sinus pressure.    Respiratory:  Negative for chest tightness and shortness of breath.    Cardiovascular:  Negative for chest pain.   Gastrointestinal:  Positive for constipation. Negative for abdominal pain, diarrhea, nausea and vomiting.   Genitourinary:  Positive for dysuria, flank pain, frequency and urgency. Negative for difficulty urinating, hematuria, pelvic pain and vaginal pain.   Musculoskeletal:  Positive for back pain. Negative for neck pain.   Allergic/Immunologic: Negative for food allergies.   Neurological:  Positive for headaches. Negative for dizziness.   Hematological:  Does not bruise/bleed easily.   Psychiatric/Behavioral:  The patient is nervous/anxious.      Physical Exam:  Physical Exam  Constitutional:       General: She is not in acute distress.     Appearance: Normal appearance.      Comments: BMI of 60   HENT:      Head: Normocephalic and atraumatic.      Nose: Nose normal.      Mouth/Throat:      Mouth: Mucous membranes are moist.   Eyes:      Conjunctiva/sclera: Conjunctivae normal.   Cardiovascular:      Rate and Rhythm: Normal rate and regular rhythm.      Pulses: Normal pulses.      Heart sounds: Normal heart sounds.   Pulmonary:      Effort: Pulmonary effort is normal.      Breath sounds: Normal breath sounds.   Abdominal:      General: Bowel sounds are normal.      Palpations: Abdomen is soft.      Tenderness: There is no right CVA tenderness or left CVA tenderness.   Musculoskeletal:         General: Normal range of motion.      Cervical back: Normal range of motion.   Skin:     General: Skin is warm.   Neurological:      General: No focal deficit present.      Mental Status: She is alert and oriented to person, place, and time.   Psychiatric:         Mood and Affect: Mood normal.         Behavior: Behavior normal.         Thought Content: Thought content normal.         Judgment: Judgment normal.       Recent Image (CT and/or KUB):   CT Abdomen and Pelvis: No results found for  this or any previous visit.     CT Stone Protocol: No results found for this or any previous visit.     KUB: No results found for this or any previous visit.       Labs:  Brief Urine Lab Results  (Last result in the past 365 days)      Color   Clarity   Blood   Leuk Est   Nitrite   Protein   CREAT   Urine HCG        06/12/23 1437 Yellow   Cloudy   Negative   Negative   Positive   Negative               Office Visit on 06/12/2023   Component Date Value Ref Range Status   • Color 06/12/2023 Yellow  Yellow, Straw, Dark Yellow, Kizzy Final   • Clarity, UA 06/12/2023 Cloudy (A)  Clear Final   • Specific Gravity  06/12/2023 1.010  1.005 - 1.030 Final   • pH, Urine 06/12/2023 8.0  5.0 - 8.0 Final   • Leukocytes 06/12/2023 Negative  Negative Final   • Nitrite, UA 06/12/2023 Positive (A)  Negative Final   • Protein, POC 06/12/2023 Negative  Negative mg/dL Final   • Glucose, UA 06/12/2023 Negative  Negative mg/dL Final   • Ketones, UA 06/12/2023 Negative  Negative Final   • Urobilinogen, UA 06/12/2023 Normal  Normal, 0.2 E.U./dL Final   • Bilirubin 06/12/2023 Negative  Negative Final   • Blood, UA 06/12/2023 Negative  Negative Final   • Lot Number 06/12/2023 n   Final   • Expiration Date 06/12/2023 n   Final   • Urine Culture 06/12/2023 50,000 CFU/mL Gram Negative Bacilli (A)   Preliminary        Procedure: None  Procedures     I have reviewed and agree with the above PMH, PSH, FMH, social history, medications, allergies, and labs.     Assessment/Plan:   Problem List Items Addressed This Visit        Genitourinary and Reproductive     Recurrent UTI (urinary tract infection) - Primary    Relevant Medications    nitrofurantoin, macrocrystal-monohydrate, (Macrobid) 100 MG capsule       Other    Mixed stress and urge incontinence   Other Visit Diagnoses     Frequency of urination        Relevant Orders    POC Urinalysis Dipstick, Automated (Completed)    Urine Culture - Urine, Urine, Random Void (Completed)          Health  Maintenance:   Health Maintenance Due   Topic Date Due   • Pneumococcal Vaccine 0-64 (1 - PCV) Never done   • ANNUAL PHYSICAL  01/12/2023        Smoking Counseling: Patient is current everyday smoker.  Never used smokeless tobacco.  Counseling given however not ready to quit at this time.    Urine Incontinence: Patient reports that she using roughly 1-2 pads per day.    Patient was given instructions and counseling regarding her condition or for health maintenance advice. Please see specific information pulled into the AVS if appropriate.    Patient Education:   Urinary tract infection -I discussed with the patient ways to help prevent urinary tract infection.  Advised patient to continue to increase water to 2 to 3 L/day.  Advised patient to take an over-the-counter probiotic to help with growth and control of normal urogenital giovanna.  Discussed with the patient the use of a nightly antibiotic to help prevent urinary tract infections.  Patient's UA today shows positive nitrites.  I will start her on a short course of Macrobid twice daily.  We will then transition to once nightly.  I will send the patient's urine off for culture analysis.  I will call once results are obtained.  Otherwise we can follow-up with her in roughly 2 months for reevaluation of symptoms.  Urinary incontinence -discussed with the patient the causes of urinary incontinence which can include but are not limited to detrusor instability, overactive bladder, urge incontinence, stress incontinence, mixed incontinence, urinary tract infections, overflow incontinence, bladder prolapse, or other urological abnormalities.  I discussed ways to help treat urinary incontinence which include but are not limited to medications versus surgery.  Did advise patient that given her significant BMI she is an increased risk for incontinence.  Did discuss the use of anticholinergics versus beta 3 agonist.  Patient would like to avoid anticholinergics at this time  given side effects.  We can start on trial of Gemtesa 75 mg once nightly.  Discussed the risks and benefits of this medication.  Advised patient discontinue if she begins to experience an increase in difficulty urinating or decreased urinary output.  Otherwise we will see her back in 2 months.  Patient verbalized understanding and agreed to plan of care.    Visit Diagnoses:    ICD-10-CM ICD-9-CM   1. Recurrent UTI (urinary tract infection)  N39.0 599.0   2. Mixed stress and urge incontinence  N39.46 788.33   3. Frequency of urination  R35.0 788.41       Meds Ordered During Visit:  New Medications Ordered This Visit   Medications   • nitrofurantoin, macrocrystal-monohydrate, (Macrobid) 100 MG capsule     Sig: Take 1 tablet by mouth twice daily for 5 days; then 1 tablet by mouth nightly     Dispense:  56 capsule     Refill:  1       Follow Up Appointment: 2 months  No follow-ups on file.      This document has been electronically signed by Chris Webb PA-C   June 13, 2023 12:35 EDT    Part of this note may be an electronic transcription/translation of spoken language to printed text using the Dragon Dictation System.

## 2023-06-13 PROBLEM — N39.0 RECURRENT UTI (URINARY TRACT INFECTION): Status: ACTIVE | Noted: 2023-06-13

## 2023-06-13 PROBLEM — N39.46 MIXED STRESS AND URGE INCONTINENCE: Status: ACTIVE | Noted: 2023-06-13

## 2023-06-14 LAB — BACTERIA SPEC AEROBE CULT: ABNORMAL

## 2023-10-02 DIAGNOSIS — F41.9 ANXIETY DISORDER, UNSPECIFIED TYPE: Chronic | ICD-10-CM

## 2023-10-02 DIAGNOSIS — G43.909 MIGRAINE WITHOUT STATUS MIGRAINOSUS, NOT INTRACTABLE, UNSPECIFIED MIGRAINE TYPE: ICD-10-CM

## 2023-10-02 DIAGNOSIS — F33.1 MODERATE EPISODE OF RECURRENT MAJOR DEPRESSIVE DISORDER: Chronic | ICD-10-CM

## 2023-10-02 DIAGNOSIS — N39.0 RECURRENT UTI (URINARY TRACT INFECTION): ICD-10-CM

## 2023-10-02 DIAGNOSIS — N39.46 MIXED STRESS AND URGE INCONTINENCE: ICD-10-CM

## 2023-10-02 RX ORDER — VORTIOXETINE 20 MG/1
20 TABLET, FILM COATED ORAL
Qty: 30 TABLET | Refills: 0 | Status: CANCELLED | OUTPATIENT
Start: 2023-10-02

## 2023-10-02 RX ORDER — NITROFURANTOIN 25; 75 MG/1; MG/1
CAPSULE ORAL
Qty: 30 CAPSULE | Refills: 2 | Status: SHIPPED | OUTPATIENT
Start: 2023-10-02

## 2023-10-02 RX ORDER — SUMATRIPTAN 25 MG/1
TABLET, FILM COATED ORAL
Qty: 20 TABLET | Refills: 5 | OUTPATIENT
Start: 2023-10-02

## 2023-10-02 RX ORDER — LAMOTRIGINE 25 MG/1
25 TABLET ORAL DAILY
Qty: 30 TABLET | Refills: 0 | Status: SHIPPED | OUTPATIENT
Start: 2023-10-02

## 2023-10-02 NOTE — TELEPHONE ENCOUNTER
A 30-day supply was sent in 4 months ago, can you see how long the patient has been without medication?  Thanks.

## 2023-10-02 NOTE — TELEPHONE ENCOUNTER
Refills have been sent in for the patient to cover until she can be seen, but since the patient has been off of her medications for approximately 1.5 months she is to restart her Trintellix at 5 mg by mouth once daily with breakfast, and restart lamotrigine at 25 mg by mouth once daily.  Please schedule the patient for a follow-up appointment to further discuss medications and mood, and if the patient has any worsening of mood or SI/HI please go to the closest emergency department/call 911.  Thanks.

## 2023-10-04 ENCOUNTER — TELEPHONE (OUTPATIENT)
Dept: UROLOGY | Facility: CLINIC | Age: 28
End: 2023-10-04
Payer: COMMERCIAL

## 2025-01-29 ENCOUNTER — HOSPITAL ENCOUNTER (INPATIENT)
Facility: HOSPITAL | Age: 30
LOS: 2 days | Discharge: HOME | DRG: 885 | End: 2025-01-31
Attending: PSYCHIATRY & NEUROLOGY | Admitting: PSYCHIATRY & NEUROLOGY
Payer: COMMERCIAL

## 2025-01-29 ENCOUNTER — HOSPITAL ENCOUNTER (EMERGENCY)
Facility: HOSPITAL | Age: 30
Discharge: STILL ADMITTED OR ADMINISTRATIVE | DRG: 885 | End: 2025-01-29
Payer: COMMERCIAL

## 2025-01-29 VITALS
WEIGHT: 293 LBS | OXYGEN SATURATION: 95 % | BODY MASS INDEX: 43.4 KG/M2 | RESPIRATION RATE: 18 BRPM | HEIGHT: 69 IN | DIASTOLIC BLOOD PRESSURE: 84 MMHG | SYSTOLIC BLOOD PRESSURE: 115 MMHG | HEART RATE: 78 BPM | TEMPERATURE: 97.6 F

## 2025-01-29 DIAGNOSIS — F33.1 MAJOR DEPRESSIVE DISORDER, RECURRENT, MODERATE: Primary | ICD-10-CM

## 2025-01-29 DIAGNOSIS — R45.851 DEPRESSION WITH SUICIDAL IDEATION: Primary | ICD-10-CM

## 2025-01-29 DIAGNOSIS — F32.A DEPRESSION WITH SUICIDAL IDEATION: Primary | ICD-10-CM

## 2025-01-29 LAB
ALBUMIN SERPL-MCNC: 4 G/DL (ref 3.5–5.2)
ALBUMIN/GLOB SERPL: 1 G/DL
ALP SERPL-CCNC: 69 U/L (ref 39–117)
ALT SERPL W P-5'-P-CCNC: 38 U/L (ref 1–33)
AMPHET+METHAMPHET UR QL: NEGATIVE
AMPHETAMINES UR QL: NEGATIVE
ANION GAP SERPL CALCULATED.3IONS-SCNC: 9.5 MMOL/L (ref 5–15)
AST SERPL-CCNC: 34 U/L (ref 1–32)
B-HCG UR QL: NEGATIVE
BACTERIA UR QL AUTO: ABNORMAL /HPF
BARBITURATES UR QL SCN: NEGATIVE
BASOPHILS # BLD AUTO: 0.01 10*3/MM3 (ref 0–0.2)
BASOPHILS NFR BLD AUTO: 0.1 % (ref 0–1.5)
BENZODIAZ UR QL SCN: NEGATIVE
BILIRUB SERPL-MCNC: 0.2 MG/DL (ref 0–1.2)
BILIRUB UR QL STRIP: NEGATIVE
BUN SERPL-MCNC: 10 MG/DL (ref 6–20)
BUN/CREAT SERPL: 11.1 (ref 7–25)
BUPRENORPHINE SERPL-MCNC: NEGATIVE NG/ML
CALCIUM SPEC-SCNC: 9.1 MG/DL (ref 8.6–10.5)
CANNABINOIDS SERPL QL: POSITIVE
CHLORIDE SERPL-SCNC: 105 MMOL/L (ref 98–107)
CLARITY UR: ABNORMAL
CO2 SERPL-SCNC: 25.5 MMOL/L (ref 22–29)
COCAINE UR QL: NEGATIVE
COLOR UR: YELLOW
CREAT SERPL-MCNC: 0.9 MG/DL (ref 0.57–1)
DEPRECATED RDW RBC AUTO: 51.5 FL (ref 37–54)
EGFRCR SERPLBLD CKD-EPI 2021: 90 ML/MIN/1.73
EOSINOPHIL # BLD AUTO: 0.19 10*3/MM3 (ref 0–0.4)
EOSINOPHIL NFR BLD AUTO: 2.8 % (ref 0.3–6.2)
ERYTHROCYTE [DISTWIDTH] IN BLOOD BY AUTOMATED COUNT: 20 % (ref 12.3–15.4)
ETHANOL BLD-MCNC: <10 MG/DL (ref 0–10)
ETHANOL UR QL: <0.01 %
FENTANYL UR-MCNC: NEGATIVE NG/ML
GLOBULIN UR ELPH-MCNC: 3.9 GM/DL
GLUCOSE SERPL-MCNC: 109 MG/DL (ref 65–99)
GLUCOSE UR STRIP-MCNC: NEGATIVE MG/DL
GRAN CASTS URNS QL MICRO: ABNORMAL /LPF
HAV IGM SERPL QL IA: NORMAL
HBV CORE IGM SERPL QL IA: NORMAL
HBV SURFACE AG SERPL QL IA: NORMAL
HCT VFR BLD AUTO: 42.3 % (ref 34–46.6)
HCV AB SER QL: NORMAL
HGB BLD-MCNC: 12.8 G/DL (ref 12–15.9)
HGB UR QL STRIP.AUTO: NEGATIVE
HOLD SPECIMEN: NORMAL
HOLD SPECIMEN: NORMAL
HYALINE CASTS UR QL AUTO: ABNORMAL /LPF
IMM GRANULOCYTES # BLD AUTO: 0.01 10*3/MM3 (ref 0–0.05)
IMM GRANULOCYTES NFR BLD AUTO: 0.1 % (ref 0–0.5)
KETONES UR QL STRIP: ABNORMAL
LEUKOCYTE ESTERASE UR QL STRIP.AUTO: NEGATIVE
LYMPHOCYTES # BLD AUTO: 2.21 10*3/MM3 (ref 0.7–3.1)
LYMPHOCYTES NFR BLD AUTO: 32.8 % (ref 19.6–45.3)
MAGNESIUM SERPL-MCNC: 2.1 MG/DL (ref 1.6–2.6)
MCH RBC QN AUTO: 22.4 PG (ref 26.6–33)
MCHC RBC AUTO-ENTMCNC: 30.3 G/DL (ref 31.5–35.7)
MCV RBC AUTO: 74.1 FL (ref 79–97)
METHADONE UR QL SCN: NEGATIVE
MONOCYTES # BLD AUTO: 0.36 10*3/MM3 (ref 0.1–0.9)
MONOCYTES NFR BLD AUTO: 5.3 % (ref 5–12)
NEUTROPHILS NFR BLD AUTO: 3.95 10*3/MM3 (ref 1.7–7)
NEUTROPHILS NFR BLD AUTO: 58.9 % (ref 42.7–76)
NITRITE UR QL STRIP: POSITIVE
NRBC BLD AUTO-RTO: 0 /100 WBC (ref 0–0.2)
OPIATES UR QL: POSITIVE
OXYCODONE UR QL SCN: NEGATIVE
PCP UR QL SCN: NEGATIVE
PH UR STRIP.AUTO: 6.5 [PH] (ref 5–8)
PLATELET # BLD AUTO: 338 10*3/MM3 (ref 140–450)
PMV BLD AUTO: 10 FL (ref 6–12)
POTASSIUM SERPL-SCNC: 4.2 MMOL/L (ref 3.5–5.2)
PROT SERPL-MCNC: 7.9 G/DL (ref 6–8.5)
PROT UR QL STRIP: NEGATIVE
RBC # BLD AUTO: 5.71 10*6/MM3 (ref 3.77–5.28)
RBC # UR STRIP: ABNORMAL /HPF
REF LAB TEST METHOD: ABNORMAL
SODIUM SERPL-SCNC: 140 MMOL/L (ref 136–145)
SP GR UR STRIP: 1.03 (ref 1–1.03)
SQUAMOUS #/AREA URNS HPF: ABNORMAL /HPF
TRICYCLICS UR QL SCN: NEGATIVE
UROBILINOGEN UR QL STRIP: ABNORMAL
WBC # UR STRIP: ABNORMAL /HPF
WBC NRBC COR # BLD AUTO: 6.73 10*3/MM3 (ref 3.4–10.8)
WHOLE BLOOD HOLD COAG: NORMAL
WHOLE BLOOD HOLD SPECIMEN: NORMAL

## 2025-01-29 PROCEDURE — 93010 ELECTROCARDIOGRAM REPORT: CPT | Performed by: INTERNAL MEDICINE

## 2025-01-29 PROCEDURE — 81025 URINE PREGNANCY TEST: CPT | Performed by: PHYSICIAN ASSISTANT

## 2025-01-29 PROCEDURE — 80307 DRUG TEST PRSMV CHEM ANLYZR: CPT | Performed by: PHYSICIAN ASSISTANT

## 2025-01-29 PROCEDURE — 81001 URINALYSIS AUTO W/SCOPE: CPT | Performed by: PHYSICIAN ASSISTANT

## 2025-01-29 PROCEDURE — 99285 EMERGENCY DEPT VISIT HI MDM: CPT

## 2025-01-29 PROCEDURE — 83735 ASSAY OF MAGNESIUM: CPT | Performed by: PHYSICIAN ASSISTANT

## 2025-01-29 PROCEDURE — 93005 ELECTROCARDIOGRAM TRACING: CPT | Performed by: PSYCHIATRY & NEUROLOGY

## 2025-01-29 PROCEDURE — 84466 ASSAY OF TRANSFERRIN: CPT | Performed by: PSYCHIATRY & NEUROLOGY

## 2025-01-29 PROCEDURE — 36415 COLL VENOUS BLD VENIPUNCTURE: CPT

## 2025-01-29 PROCEDURE — 83540 ASSAY OF IRON: CPT | Performed by: PSYCHIATRY & NEUROLOGY

## 2025-01-29 PROCEDURE — 82077 ASSAY SPEC XCP UR&BREATH IA: CPT | Performed by: PHYSICIAN ASSISTANT

## 2025-01-29 PROCEDURE — 80053 COMPREHEN METABOLIC PANEL: CPT | Performed by: PHYSICIAN ASSISTANT

## 2025-01-29 PROCEDURE — 80074 ACUTE HEPATITIS PANEL: CPT | Performed by: PSYCHIATRY & NEUROLOGY

## 2025-01-29 PROCEDURE — 85025 COMPLETE CBC W/AUTO DIFF WBC: CPT | Performed by: PHYSICIAN ASSISTANT

## 2025-01-29 RX ORDER — HYDROXYZINE HYDROCHLORIDE 50 MG/1
50 TABLET, FILM COATED ORAL EVERY 6 HOURS PRN
Status: DISCONTINUED | OUTPATIENT
Start: 2025-01-29 | End: 2025-01-31 | Stop reason: HOSPADM

## 2025-01-29 RX ORDER — NICOTINE 21 MG/24HR
1 PATCH, TRANSDERMAL 24 HOURS TRANSDERMAL
Status: DISCONTINUED | OUTPATIENT
Start: 2025-01-29 | End: 2025-01-31 | Stop reason: HOSPADM

## 2025-01-29 RX ORDER — BENZONATATE 100 MG/1
100 CAPSULE ORAL 3 TIMES DAILY PRN
Status: DISCONTINUED | OUTPATIENT
Start: 2025-01-29 | End: 2025-01-31 | Stop reason: HOSPADM

## 2025-01-29 RX ORDER — ECHINACEA PURPUREA EXTRACT 125 MG
2 TABLET ORAL AS NEEDED
Status: DISCONTINUED | OUTPATIENT
Start: 2025-01-29 | End: 2025-01-31 | Stop reason: HOSPADM

## 2025-01-29 RX ORDER — FAMOTIDINE 20 MG/1
20 TABLET, FILM COATED ORAL 2 TIMES DAILY PRN
Status: DISCONTINUED | OUTPATIENT
Start: 2025-01-29 | End: 2025-01-31 | Stop reason: HOSPADM

## 2025-01-29 RX ORDER — TRAZODONE HYDROCHLORIDE 50 MG/1
50 TABLET, FILM COATED ORAL NIGHTLY PRN
Status: DISCONTINUED | OUTPATIENT
Start: 2025-01-29 | End: 2025-01-31 | Stop reason: HOSPADM

## 2025-01-29 RX ORDER — ONDANSETRON 4 MG/1
4 TABLET, ORALLY DISINTEGRATING ORAL EVERY 6 HOURS PRN
Status: DISCONTINUED | OUTPATIENT
Start: 2025-01-29 | End: 2025-01-31 | Stop reason: HOSPADM

## 2025-01-29 RX ORDER — IBUPROFEN 400 MG/1
400 TABLET, FILM COATED ORAL EVERY 6 HOURS PRN
Status: DISCONTINUED | OUTPATIENT
Start: 2025-01-29 | End: 2025-01-31 | Stop reason: HOSPADM

## 2025-01-29 RX ORDER — ALUMINA, MAGNESIA, AND SIMETHICONE 2400; 2400; 240 MG/30ML; MG/30ML; MG/30ML
15 SUSPENSION ORAL EVERY 6 HOURS PRN
Status: DISCONTINUED | OUTPATIENT
Start: 2025-01-29 | End: 2025-01-31 | Stop reason: HOSPADM

## 2025-01-29 RX ORDER — BENZTROPINE MESYLATE 1 MG/ML
1 INJECTION, SOLUTION INTRAMUSCULAR; INTRAVENOUS ONCE AS NEEDED
Status: DISCONTINUED | OUTPATIENT
Start: 2025-01-29 | End: 2025-01-31 | Stop reason: HOSPADM

## 2025-01-29 RX ORDER — ACETAMINOPHEN 325 MG/1
650 TABLET ORAL EVERY 6 HOURS PRN
Status: DISCONTINUED | OUTPATIENT
Start: 2025-01-29 | End: 2025-01-31 | Stop reason: HOSPADM

## 2025-01-29 RX ORDER — BENZTROPINE MESYLATE 1 MG/1
2 TABLET ORAL ONCE AS NEEDED
Status: DISCONTINUED | OUTPATIENT
Start: 2025-01-29 | End: 2025-01-31 | Stop reason: HOSPADM

## 2025-01-29 RX ORDER — LOPERAMIDE HYDROCHLORIDE 2 MG/1
2 CAPSULE ORAL
Status: DISCONTINUED | OUTPATIENT
Start: 2025-01-29 | End: 2025-01-31 | Stop reason: HOSPADM

## 2025-01-29 RX ORDER — POLYETHYLENE GLYCOL 3350 17 G/17G
17 POWDER, FOR SOLUTION ORAL DAILY PRN
Status: DISCONTINUED | OUTPATIENT
Start: 2025-01-29 | End: 2025-01-31 | Stop reason: HOSPADM

## 2025-01-29 RX ORDER — NITROFURANTOIN 25; 75 MG/1; MG/1
100 CAPSULE ORAL EVERY 12 HOURS SCHEDULED
Status: DISCONTINUED | OUTPATIENT
Start: 2025-01-29 | End: 2025-01-31 | Stop reason: HOSPADM

## 2025-01-29 RX ADMIN — NITROFURANTOIN MONOHYDRATE/MACROCRYSTALS 100 MG: 25; 75 CAPSULE ORAL at 17:42

## 2025-01-29 RX ADMIN — HYDROXYZINE HYDROCHLORIDE 50 MG: 50 TABLET ORAL at 20:54

## 2025-01-29 RX ADMIN — IBUPROFEN 400 MG: 400 TABLET, FILM COATED ORAL at 20:54

## 2025-01-29 RX ADMIN — NICOTINE 1 PATCH: 21 PATCH TRANSDERMAL at 17:42

## 2025-01-29 NOTE — ED PROVIDER NOTES
Subjective   History of Present Illness  29-year-old female presents to the ED today for a mental health evaluation.  She reports that she has been having suicidal ideations for about 1 week.  She states she broke up with her boyfriend of 9 years.  She states that she has a plan to overdose.  She denies any homicidal ideations.  She denies any drug use but states that she has been drinking alcohol 2 or 3 times per week.  She states her last drink was about 3 days ago.  She reports that she has not been sleeping and her appetite has been decreased.  She denies any hallucinations.    History provided by:  Patient  Mental Health Problem  Presenting symptoms: depression and suicidal thoughts    Degree of incapacity (severity):  Severe  Onset quality:  Gradual  Duration:  1 week  Timing:  Constant  Progression:  Unchanged  Chronicity:  New  Context: alcohol use    Relieved by:  Nothing  Worsened by:  Nothing  Associated symptoms: anxiety, appetite change and insomnia    Risk factors: hx of mental illness and hx of suicide attempts        Review of Systems   Constitutional:  Positive for appetite change.   HENT: Negative.     Eyes: Negative.    Respiratory: Negative.     Cardiovascular: Negative.    Gastrointestinal: Negative.    Genitourinary: Negative.    Musculoskeletal: Negative.    Skin: Negative.    Neurological: Negative.    Psychiatric/Behavioral:  Positive for dysphoric mood, sleep disturbance and suicidal ideas. The patient is nervous/anxious and has insomnia.    All other systems reviewed and are negative.      Past Medical History:   Diagnosis Date    Anxiety     Bipolar disorder     Depression     Headache     Hypertension     Obesity     Panic disorder     PTSD (post-traumatic stress disorder)     Self-injurious behavior     Sleep apnea     Suicide attempt     attempted overdose in 2017       No Known Allergies    Past Surgical History:   Procedure Laterality Date    TONSILLECTOMY         Family History    Problem Relation Age of Onset    Diabetes Mother     Anxiety disorder Mother     Depression Mother     Anxiety disorder Father     Depression Father     Anxiety disorder Sister     Depression Sister     Depression Maternal Aunt     Heart attack Paternal Aunt     Anxiety disorder Paternal Aunt     Depression Paternal Aunt     No Known Problems Maternal Uncle     No Known Problems Paternal Uncle     Lung disease Maternal Grandfather     Alzheimer's disease Maternal Grandmother     Lung cancer Paternal Grandfather     Alzheimer's disease Paternal Grandmother     Alcohol abuse Paternal Grandmother     Anxiety disorder Sister     Depression Sister     Anxiety disorder Paternal Aunt     Depression Paternal Aunt     No Known Problems Maternal Uncle     No Known Problems Paternal Uncle     Heart attack Paternal Uncle     Other Paternal Uncle        Social History     Socioeconomic History    Marital status: Single   Tobacco Use    Smoking status: Every Day     Current packs/day: 0.50     Average packs/day: 0.5 packs/day for 11.1 years (5.5 ttl pk-yrs)     Types: Cigarettes     Start date: 2014    Smokeless tobacco: Never   Vaping Use    Vaping status: Some Days    Substances: Nicotine, THC, Flavoring    Devices: Pre-filled or refillable cartridge, Pre-filled pod   Substance and Sexual Activity    Alcohol use: Not Currently    Drug use: Yes     Types: Marijuana    Sexual activity: Yes     Partners: Male     Birth control/protection: None           Objective   Physical Exam  Vitals and nursing note reviewed.   Constitutional:       General: She is not in acute distress.     Appearance: Normal appearance. She is obese. She is not diaphoretic.   HENT:      Head: Normocephalic and atraumatic.      Right Ear: External ear normal.      Left Ear: External ear normal.      Nose: Nose normal.   Eyes:      Conjunctiva/sclera: Conjunctivae normal.      Pupils: Pupils are equal, round, and reactive to light.   Cardiovascular:       Rate and Rhythm: Normal rate and regular rhythm.      Pulses: Normal pulses.      Heart sounds: Normal heart sounds.   Pulmonary:      Effort: Pulmonary effort is normal.      Breath sounds: Normal breath sounds.   Abdominal:      General: Bowel sounds are normal.      Palpations: Abdomen is soft.   Musculoskeletal:         General: Normal range of motion.      Cervical back: Normal range of motion and neck supple.   Skin:     General: Skin is warm and dry.      Capillary Refill: Capillary refill takes less than 2 seconds.   Neurological:      General: No focal deficit present.      Mental Status: She is alert and oriented to person, place, and time.   Psychiatric:         Mood and Affect: Mood is depressed. Affect is tearful.         Speech: Speech normal.         Behavior: Behavior normal. Behavior is cooperative.         Thought Content: Thought content includes suicidal ideation. Thought content does not include homicidal ideation. Thought content includes suicidal plan.         Procedures       Results for orders placed or performed during the hospital encounter of 01/29/25   Comprehensive Metabolic Panel    Collection Time: 01/29/25  3:42 PM    Specimen: Blood   Result Value Ref Range    Glucose 109 (H) 65 - 99 mg/dL    BUN 10 6 - 20 mg/dL    Creatinine 0.90 0.57 - 1.00 mg/dL    Sodium 140 136 - 145 mmol/L    Potassium 4.2 3.5 - 5.2 mmol/L    Chloride 105 98 - 107 mmol/L    CO2 25.5 22.0 - 29.0 mmol/L    Calcium 9.1 8.6 - 10.5 mg/dL    Total Protein 7.9 6.0 - 8.5 g/dL    Albumin 4.0 3.5 - 5.2 g/dL    ALT (SGPT) 38 (H) 1 - 33 U/L    AST (SGOT) 34 (H) 1 - 32 U/L    Alkaline Phosphatase 69 39 - 117 U/L    Total Bilirubin 0.2 0.0 - 1.2 mg/dL    Globulin 3.9 gm/dL    A/G Ratio 1.0 g/dL    BUN/Creatinine Ratio 11.1 7.0 - 25.0    Anion Gap 9.5 5.0 - 15.0 mmol/L    eGFR 90.0 >60.0 mL/min/1.73   Ethanol    Collection Time: 01/29/25  3:42 PM    Specimen: Blood   Result Value Ref Range    Ethanol <10 0 - 10 mg/dL     Ethanol % <0.010 %   Magnesium    Collection Time: 01/29/25  3:42 PM    Specimen: Blood   Result Value Ref Range    Magnesium 2.1 1.6 - 2.6 mg/dL   CBC Auto Differential    Collection Time: 01/29/25  3:42 PM    Specimen: Blood   Result Value Ref Range    WBC 6.73 3.40 - 10.80 10*3/mm3    RBC 5.71 (H) 3.77 - 5.28 10*6/mm3    Hemoglobin 12.8 12.0 - 15.9 g/dL    Hematocrit 42.3 34.0 - 46.6 %    MCV 74.1 (L) 79.0 - 97.0 fL    MCH 22.4 (L) 26.6 - 33.0 pg    MCHC 30.3 (L) 31.5 - 35.7 g/dL    RDW 20.0 (H) 12.3 - 15.4 %    RDW-SD 51.5 37.0 - 54.0 fl    MPV 10.0 6.0 - 12.0 fL    Platelets 338 140 - 450 10*3/mm3    Neutrophil % 58.9 42.7 - 76.0 %    Lymphocyte % 32.8 19.6 - 45.3 %    Monocyte % 5.3 5.0 - 12.0 %    Eosinophil % 2.8 0.3 - 6.2 %    Basophil % 0.1 0.0 - 1.5 %    Immature Grans % 0.1 0.0 - 0.5 %    Neutrophils, Absolute 3.95 1.70 - 7.00 10*3/mm3    Lymphocytes, Absolute 2.21 0.70 - 3.10 10*3/mm3    Monocytes, Absolute 0.36 0.10 - 0.90 10*3/mm3    Eosinophils, Absolute 0.19 0.00 - 0.40 10*3/mm3    Basophils, Absolute 0.01 0.00 - 0.20 10*3/mm3    Immature Grans, Absolute 0.01 0.00 - 0.05 10*3/mm3    nRBC 0.0 0.0 - 0.2 /100 WBC   Green Top (Gel)    Collection Time: 01/29/25  3:42 PM   Result Value Ref Range    Extra Tube Hold for add-ons.    Lavender Top    Collection Time: 01/29/25  3:42 PM   Result Value Ref Range    Extra Tube hold for add-on    Gold Top - SST    Collection Time: 01/29/25  3:42 PM   Result Value Ref Range    Extra Tube Hold for add-ons.    Light Blue Top    Collection Time: 01/29/25  3:42 PM   Result Value Ref Range    Extra Tube Hold for add-ons.    Pregnancy, Urine - Urine, Clean Catch    Collection Time: 01/29/25  3:50 PM    Specimen: Urine, Clean Catch   Result Value Ref Range    HCG, Urine QL Negative Negative   Urinalysis With Microscopic If Indicated (No Culture) - Urine, Clean Catch    Collection Time: 01/29/25  3:50 PM    Specimen: Urine, Clean Catch   Result Value Ref Range    Color, UA  Yellow Yellow, Straw    Appearance, UA Cloudy (A) Clear    pH, UA 6.5 5.0 - 8.0    Specific Gravity, UA 1.027 1.005 - 1.030    Glucose, UA Negative Negative    Ketones, UA Trace (A) Negative    Bilirubin, UA Negative Negative    Blood, UA Negative Negative    Protein, UA Negative Negative    Leuk Esterase, UA Negative Negative    Nitrite, UA Positive (A) Negative    Urobilinogen, UA 1.0 E.U./dL 0.2 - 1.0 E.U./dL   Urine Drug Screen - Urine, Clean Catch    Collection Time: 01/29/25  3:50 PM    Specimen: Urine, Clean Catch   Result Value Ref Range    THC, Screen, Urine Positive (A) Negative    Phencyclidine (PCP), Urine Negative Negative    Cocaine Screen, Urine Negative Negative    Methamphetamine, Ur Negative Negative    Opiate Screen Positive (A) Negative    Amphetamine Screen, Urine Negative Negative    Benzodiazepine Screen, Urine Negative Negative    Tricyclic Antidepressants Screen Negative Negative    Methadone Screen, Urine Negative Negative    Barbiturates Screen, Urine Negative Negative    Oxycodone Screen, Urine Negative Negative    Buprenorphine, Screen, Urine Negative Negative   Fentanyl, Urine - Urine, Clean Catch    Collection Time: 01/29/25  3:50 PM    Specimen: Urine, Clean Catch   Result Value Ref Range    Fentanyl, Urine Negative Negative   Urinalysis, Microscopic Only - Urine, Clean Catch    Collection Time: 01/29/25  3:50 PM    Specimen: Urine, Clean Catch   Result Value Ref Range    RBC, UA 0-2 None Seen, 0-2 /HPF    WBC, UA 6-10 (A) None Seen, 0-2 /HPF    Bacteria, UA 4+ (A) None Seen /HPF    Squamous Epithelial Cells, UA 21-30 (A) None Seen, 0-2 /HPF    Hyaline Casts, UA 3-6 None Seen /LPF    Granular Casts, UA 7-12 None Seen /LPF    Methodology Manual Light Microscopy           ED Course                                                       Medical Decision Making  29-year-old female who presents to the ED today for mental health evaluation.  She was medically cleared for the evaluation.   Psychiatry was consulted and she will be admitted.  I did recommend that she be started on Macrobid 100 mg twice daily x 7 days for her urine.    Problems Addressed:  Depression with suicidal ideation: complicated acute illness or injury    Amount and/or Complexity of Data Reviewed  Labs: ordered.        Final diagnoses:   Depression with suicidal ideation       ED Disposition  ED Disposition       ED Disposition   DC/Transfer to Behavioral Health    Condition   Stable    Comment   --               No follow-up provider specified.       Medication List      No changes were made to your prescriptions during this visit.            Yasmin Little PA  01/29/25 1636       Yasmin Little PA  01/29/25 1631

## 2025-01-29 NOTE — PLAN OF CARE
Goal Outcome Evaluation:  Plan of Care Reviewed With: patient  Patient Agreement with Plan of Care: agrees                   NEW ADMISSION TO UNIT. CARE PLAN INITIATED.

## 2025-01-29 NOTE — NURSING NOTE
"Pt assessment complete. Pt states she has been having suicidal thoughts with a plan to overdose for the past week. States she broke up with her boyfriend of 9 years a week ago, and had to move back in with her parents which has all been a lot and took a strain on her mental health. States her depression and anxiety have been really high lately making her feel worthless and life isn't worth living.     Pt denies HI/AVH.     Pt rates depression 8, anxiety 6.    Pt denies drugs except occasional marijuana, denies etoh at this time, states she had been drinking 2/3 times a week \"just a few here and there.\",  hasn't had any in 3 days.     Pt states she has been at [facility] inpatient twice for mental health, states a few years ago she tried to OD.     Pt is not being seen outpatient.       Pt states she pulled her back a few days ago and took a hydrocodone which is why she is positive for opiates.     "

## 2025-01-29 NOTE — NURSING NOTE
"Spoke to  discussed pt assessment, labs, and vitals. New orders to admit pt to AE1 with routine orders SP3.   RBVOx2  ED provider made aware.     Per ED provider once on the floor begin \"Macrobid 100mg BID for 7 days.\"    instructed ok to order.   "

## 2025-01-30 LAB
IRON 24H UR-MRATE: 24 MCG/DL (ref 37–145)
IRON SATN MFR SERPL: 5 % (ref 20–50)
QT INTERVAL: 400 MS
QTC INTERVAL: 419 MS
TIBC SERPL-MCNC: 471 MCG/DL (ref 298–536)
TRANSFERRIN SERPL-MCNC: 316 MG/DL (ref 200–360)

## 2025-01-30 PROCEDURE — 99223 1ST HOSP IP/OBS HIGH 75: CPT | Performed by: PSYCHIATRY & NEUROLOGY

## 2025-01-30 RX ORDER — BUPROPION HYDROCHLORIDE 150 MG/1
150 TABLET ORAL DAILY
Status: DISCONTINUED | OUTPATIENT
Start: 2025-01-30 | End: 2025-01-31 | Stop reason: HOSPADM

## 2025-01-30 RX ORDER — ARIPIPRAZOLE 10 MG/1
15 TABLET ORAL DAILY
Status: DISCONTINUED | OUTPATIENT
Start: 2025-01-30 | End: 2025-01-31 | Stop reason: HOSPADM

## 2025-01-30 RX ORDER — VENLAFAXINE HYDROCHLORIDE 75 MG/1
75 CAPSULE, EXTENDED RELEASE ORAL
Status: DISCONTINUED | OUTPATIENT
Start: 2025-01-30 | End: 2025-01-31 | Stop reason: HOSPADM

## 2025-01-30 RX ADMIN — ACETAMINOPHEN 650 MG: 325 TABLET ORAL at 20:40

## 2025-01-30 RX ADMIN — HYDROXYZINE HYDROCHLORIDE 50 MG: 50 TABLET ORAL at 08:17

## 2025-01-30 RX ADMIN — TRAZODONE HYDROCHLORIDE 50 MG: 50 TABLET ORAL at 20:40

## 2025-01-30 RX ADMIN — IBUPROFEN 400 MG: 400 TABLET, FILM COATED ORAL at 08:44

## 2025-01-30 RX ADMIN — BUPROPION HYDROCHLORIDE 150 MG: 150 TABLET, EXTENDED RELEASE ORAL at 11:04

## 2025-01-30 RX ADMIN — NICOTINE 1 PATCH: 21 PATCH TRANSDERMAL at 08:17

## 2025-01-30 RX ADMIN — HYDROXYZINE HYDROCHLORIDE 50 MG: 50 TABLET ORAL at 17:52

## 2025-01-30 RX ADMIN — VENLAFAXINE HYDROCHLORIDE 75 MG: 75 CAPSULE, EXTENDED RELEASE ORAL at 11:04

## 2025-01-30 RX ADMIN — NITROFURANTOIN MONOHYDRATE/MACROCRYSTALS 100 MG: 25; 75 CAPSULE ORAL at 20:40

## 2025-01-30 RX ADMIN — ARIPIPRAZOLE 15 MG: 10 TABLET ORAL at 11:04

## 2025-01-30 RX ADMIN — NITROFURANTOIN MONOHYDRATE/MACROCRYSTALS 100 MG: 25; 75 CAPSULE ORAL at 08:17

## 2025-01-30 RX ADMIN — MAGNESIUM HYDROXIDE 10 ML: 2400 SUSPENSION ORAL at 08:28
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
## 2025-01-30 NOTE — PLAN OF CARE
"Goal Outcome Evaluation:  Plan of Care Reviewed With: patient  Plan of Care Reviewed With: patient  Patient Agreement with Plan of Care: agrees     Progress: improving  Outcome Evaluation: Pt calm and cooperative with staff. Pt verbalizes to this RN that she feels she has, \"jumped the gun by coming here.\" Pt reports she is not having any suicidal thoughts at this time and reports she feels as if she does not need to be here, encouraged pt to discuss with MD and therapist in AM. Pt reports good appetite and poor sleep. Pt rates anxiety 4 and depression 7. Pt denies SI,HI,AVH. Pt reports burning upon urination, pt is aware of UTI dx and treatment. Encouraged pt to increase oral intake of fluids. Pt received PRN vistaril and ibuprofen this shift.                             "

## 2025-01-30 NOTE — PLAN OF CARE
Goal Outcome Evaluation:  Plan of Care Reviewed With: patient  Plan of Care Reviewed With: patient  Patient Agreement with Plan of Care: agrees     Progress: improving          Patient rates anxiety  4 and depression 6, denies thoughts of harming self and others, and denies hallucinations.

## 2025-01-31 VITALS
OXYGEN SATURATION: 98 % | HEIGHT: 69 IN | BODY MASS INDEX: 43.4 KG/M2 | WEIGHT: 293 LBS | SYSTOLIC BLOOD PRESSURE: 166 MMHG | TEMPERATURE: 97.3 F | RESPIRATION RATE: 18 BRPM | HEART RATE: 85 BPM | DIASTOLIC BLOOD PRESSURE: 90 MMHG

## 2025-01-31 PROBLEM — F60.3 BORDERLINE PERSONALITY DISORDER: Status: ACTIVE | Noted: 2025-01-31

## 2025-01-31 PROBLEM — F43.25 ADJUSTMENT DISORDER WITH MIXED DISTURBANCE OF EMOTIONS AND CONDUCT: Status: ACTIVE | Noted: 2025-01-31

## 2025-01-31 PROBLEM — F12.20 CANNABIS USE DISORDER, SEVERE, DEPENDENCE: Status: ACTIVE | Noted: 2025-01-31

## 2025-01-31 PROBLEM — R45.851 SUICIDAL IDEATION: Status: RESOLVED | Noted: 2025-01-29 | Resolved: 2025-01-31

## 2025-01-31 PROBLEM — E61.1 IRON DEFICIENCY: Status: ACTIVE | Noted: 2025-01-31

## 2025-01-31 PROBLEM — F31.9 BIPOLAR DISORDER, UNSPECIFIED: Status: ACTIVE | Noted: 2025-01-31

## 2025-01-31 PROCEDURE — 99239 HOSP IP/OBS DSCHRG MGMT >30: CPT | Performed by: PSYCHIATRY & NEUROLOGY

## 2025-01-31 RX ORDER — ARIPIPRAZOLE 15 MG/1
15 TABLET ORAL DAILY
Qty: 30 TABLET | Refills: 0 | Status: SHIPPED | OUTPATIENT
Start: 2025-02-01

## 2025-01-31 RX ORDER — DESVENLAFAXINE 50 MG/1
50 TABLET, FILM COATED, EXTENDED RELEASE ORAL DAILY
Qty: 30 TABLET | Refills: 0 | Status: SHIPPED | OUTPATIENT
Start: 2025-01-31

## 2025-01-31 RX ORDER — NITROFURANTOIN 25; 75 MG/1; MG/1
100 CAPSULE ORAL EVERY 12 HOURS SCHEDULED
Qty: 10 CAPSULE | Refills: 0 | Status: SHIPPED | OUTPATIENT
Start: 2025-01-31 | End: 2025-02-05

## 2025-01-31 RX ORDER — BUPROPION HYDROCHLORIDE 150 MG/1
150 TABLET ORAL DAILY
Qty: 30 TABLET | Refills: 0 | Status: SHIPPED | OUTPATIENT
Start: 2025-02-01

## 2025-01-31 RX ADMIN — BUPROPION HYDROCHLORIDE 150 MG: 150 TABLET, EXTENDED RELEASE ORAL at 08:19

## 2025-01-31 RX ADMIN — VENLAFAXINE HYDROCHLORIDE 75 MG: 75 CAPSULE, EXTENDED RELEASE ORAL at 08:20

## 2025-01-31 RX ADMIN — NICOTINE 1 PATCH: 21 PATCH TRANSDERMAL at 08:19

## 2025-01-31 RX ADMIN — NITROFURANTOIN MONOHYDRATE/MACROCRYSTALS 100 MG: 25; 75 CAPSULE ORAL at 08:20

## 2025-01-31 RX ADMIN — ARIPIPRAZOLE 15 MG: 10 TABLET ORAL at 08:19

## 2025-01-31 RX ADMIN — HYDROXYZINE HYDROCHLORIDE 50 MG: 50 TABLET ORAL at 08:20

## 2025-01-31 NOTE — DISCHARGE SUMMARY
PSYCHIATRIC DISCHARGE SUMMARY     Patient Identification:  Name:  Kamryn Gross  Age:  29 y.o.  Sex:  female  :  1995  MRN:  [id number]  Visit Number:  [id number]    Date of Admission:2025   Date of Discharge:  2025    Discharge Diagnosis:  Active Problems:    Bipolar disorder, unspecified    Borderline personality disorder    Adjustment disorder with mixed disturbance of emotions and conduct    Cannabis use disorder, severe, dependence    Iron deficiency      Admission Diagnosis:  Suicidal ideation [R45.851]     Hospital Course  Patient is a 29 y.o. female presented with depression and SI.  Admitted for crisis stabilization.  Admission labs with MCV 74.1, iron level at 24, UA + nitrite, UDS + opiates/THC.  Patient with recent break-up with 9-year relationship, leading to mood disturbance and emergence of SI.  Patient reports a history of bipolar disorder and BPD.  Recent symptoms appear to be commendation of adjustment disorder and BPD.  Plan to taper off on Pristiq and reinstitute Wellbutrin initiated in the hospital.  Patient reported rapid improvement and denied any active SI, voicing readiness to return home, as she had just gotten news that she got a job which she has been hoping for weeks, with first shift either this weekend or Monday.  He was involved in treatment and discharge planning, voicing comfort with patient returning home, citing that this type of admission is a regular occurrence for the patient and feel that she will be safe at their home.  Patient appropriate for discharge at this time.    By the conclusion of this hospitalization, patient is exhibiting no acutely concerning symptoms of mood, psychotic or thought disorder that would necessitate further inpatient care. Patient is also denying SI, HI, and AVH. Patient has shown improvement of presenting symptoms, exhibited no behavior concerning for harm to self or others, and is considered appropriate for discharge to a  "lower level of care today. Treatment and safe discharge planning completed. Outpatient care ascertained.     Mental Status Exam upon discharge:   Mood \"better\"   Affect-congruent, appropriate, stable  Thought Content-goal directed, no delusional material present  Thought process-linear, organized.  Suicidality: No SI  Homicidality: No HI  Perception: No AH/VH    Procedures Performed         Consults:   Consults       No orders found from 12/31/2024 to 1/30/2025.            Pertinent Test Results:   Lab Results (last 7 days)       Procedure Component Value Units Date/Time    Iron Profile [[id number]]  (Abnormal) Collected: 01/29/25 1542    Specimen: Blood Updated: 01/30/25 1139     Iron 24 mcg/dL      Iron Saturation (TSAT) 5 %      Transferrin 316 mg/dL      TIBC 471 mcg/dL     Hepatitis Panel, Acute [[id number]]  (Normal) Collected: 01/29/25 1655    Specimen: Blood Updated: 01/29/25 1722     Hepatitis B Surface Ag Non-Reactive     Hep A IgM Non-Reactive     Hep B C IgM Non-Reactive     Hepatitis C Ab Non-Reactive    Narrative:      Results may be falsely decreased if patient taking Biotin.             Condition on Discharge:  improved    Vital Signs  Temp:  [97.3 °F (36.3 °C)-97.4 °F (36.3 °C)] 97.3 °F (36.3 °C)  Heart Rate:  [79-85] 85  Resp:  [18] 18  BP: (153-166)/(90) 166/90    Discharge Disposition:  Home or Self Care    Discharge Medications:     Discharge Medications        New Medications        Instructions Start Date   ARIPiprazole 15 MG tablet  Commonly known as: ABILIFY   15 mg, Oral, Daily   Start Date: February 1, 2025     buPROPion  MG 24 hr tablet  Commonly known as: WELLBUTRIN XL   150 mg, Oral, Daily   Start Date: February 1, 2025     desvenlafaxine 50 MG 24 hr tablet  Commonly known as: Pristiq   50 mg, Oral, Daily      nitrofurantoin (macrocrystal-monohydrate) 100 MG capsule  Commonly known as: MACROBID   100 mg, Oral, Every 12 Hours Scheduled               Discharge Diet: Normal  Diet " Instructions    As tolerated            Activity at Discharge: Normal  Activity Instructions    As tolerated            Follow-up Appointments  Future Appointments   Date Time Provider Department Center   2/3/2025  9:00 AM Madeline Agee [facility]         Test Results Pending at Discharge  None     Time: I spent greater than 30 minutes on this discharge activity which included: face-to-face encounter with the patient, reviewing the data in the system, coordination of the care with the nursing staff as well as consultants, documentation, and entering orders.      Clinician:   Pradip Earl MD  01/31/25  15:55 EST

## 2025-01-31 NOTE — CASE MANAGEMENT/SOCIAL WORK
..Bridge Session  Date: 1/31/2025   Time: 1100    Data:  Reason for Inpatient Admission: Depression, SI    Follow up: [facility]  [address], [place] [address] [phone]    Appointment with Starr on February 6 2025 at 10:30am    Feb 03, 2025 9:00 AM  Initial Evaluation with Madeline Agee, [facility] ([place] [address] [facility] [address]  [phone]   New: Please arrive 30 min prior to appointment and bring all medication, insurance cards, and ID.  Established: Please make sure to bring all medication, insurance cards, and ID.         Coping Skills to Utilize: Patient encouraged to engage in physical activity, mindfulness, negative thought redirection and engaging their support system.    Crisis Safety Plan:  Support System to utilize and contact numbers: patients mother and patient has contact number    Educated on crisis hotline numbers (yes/no): Yes    Was the Patient made aware of contact information for the following: community mental health centers, crisis stabilization programs, residential programs, , etc (yes/no): Yes    Will transportation be a barrier (yes/no): No  If so, explain solution(s) to resolve barrier: n/a    How and where will the patient obtain prescribed medications: Patients medications were filled today by [facility] and brought to patient.  The cost of the medication was covered by insurance.    Assessment: Patient is denying suicidal ideation today and denying homicidal ideation today.  Patient reports decrease in Depression today and decrease in Anxiety today.  Patient successful in identifying healthy coping and protective factors this date.  Patient is future oriented and ready for discharge.      Plan:    Discussed the importance of follow up treatment for continuity of care. The Patient was able to verbalize understanding and commitment to the individualized  aftercare and crisis safety plan.      Mariella Vidales, [facility]

## 2025-01-31 NOTE — DISCHARGE INSTR - APPOINTMENTS
[facility] [place] [place] [address] [facility] [address] [place] [address]  [phone]    Appointment with Starr on February 6 2025 at 10:30am

## 2025-01-31 NOTE — PLAN OF CARE
Goal Outcome Evaluation:  Plan of Care Reviewed With: patient  Patient Agreement with Plan of Care: agrees        Outcome Evaluation: Pt reports good appetite and sleep. A 0 D 4. Denies SI/HI/AVH

## 2025-01-31 NOTE — PLAN OF CARE
Problem: Adult Behavioral Health Plan of Care  Goal: Develops/Participates in Therapeutic [place] to Support Successful Transition  Intervention: Mutually Develop Transition Plan  Recent Flowsheet Documentation  Taken 1/31/2025 1513 by Mariella Vidales LCSW  Transportation Anticipated: family or friend will provide  Transportation Concerns: none  Current Discharge Risk: psychiatric illness  Concerns to be Addressed:   coping/stress   mental health   medication   relationship  Readmission Within the Last 30 Days: no previous admission in last 30 days  Patient/Family Anticipated Services at Transition:   mental health services   outpatient care  Patient/Family Anticipates Transition to: home with family  Offered/Gave Vendor List: no    Therapist discussed possible discharge today with Dr. Earl and with patients mother.  Patient denies SI and denies HI.  Patient reports decrease in anxiety and depression.  She reports being anxious to discharge and start a part time job with her mother.  Patients mother agrees patient can start when she is discharged.  Reviewed safety with patient and her mother who are receptive.  Patients mother is agreeable to discharge this date and will pick patient up this evening around 5pm.

## 2025-01-31 NOTE — PLAN OF CARE
Goal Outcome Evaluation:  Plan of Care Reviewed With: patient  Plan of Care Reviewed With: patient  Patient Agreement with Plan of Care: agrees     Progress: improving     Dr. Earl is discharging patient home today.

## 2025-02-01 NOTE — PAYOR COMM NOTE
"Kamryn Bryson (29 y.o. Female)       Date of Birth   1995    Social Security Number       Address   [address] [place] [address] [address] [facility] [address]    Home Phone   [phone]    MRN   [id number]       Worship   None    Marital Status   Single                            Admission Date   1/29/25    Admission Type   Emergency    Admitting Provider   Pradip Earl MD    Attending Provider       Department, Room/Bed   [facility] AD[facility], 1021/1S       Discharge Date   1/31/2025    Discharge Disposition   Home or Self Care    Discharge Destination   Home                              Attending Provider: (none)   Allergies: No Known Allergies    Isolation: None   Infection: None   Code Status: Prior    Ht: 175.3 cm (69\")   Wt: 175 kg (385 lb 6 oz)    Admission Cmt: None   Principal Problem: Suicidal ideation [R45.851]                   Active Insurance as of 1/29/2025       Primary Coverage       Payor Plan Insurance Group Employer/Plan Group    [employer] [place] [employer]        Payor Plan Address Payor Plan Phone Number Payor Plan Fax Number Effective Dates    [address]   6/1/2022 - None Entered    [facility] [id number]         Subscriber Name Subscriber Birth Date Member ID       KAMRYN BRYSON 1995 [id number]                     Emergency Contacts        (Rel.) Home Phone Work Phone Mobile Phone    CARLOS BRYSON (Mother) [phone] -- [phone]          PLEASE ATTACH THIS DISCHARGE INFORMATION TO REFERENCE # [id number]    ADMISSION DATE:  1/29/2025  DISCHARGE DATE:  1/31/2025    Discharge Diagnosis:  Active Problems:    Bipolar disorder, unspecified (F31.9)    Borderline personality disorder (F60.3)    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  Established: Please make sure to bring all medication, insurance cards, and ID. [facility] [address] [place] [address]  [phone]       Additional Information  [facility]  [address], [place] [address]  [phone]     Appointment with Starr on 2025 at 10:30am     DISCHARGE SUMMARY:    Pradip Earl MD   Physician  Psychiatry     Discharge Summary     Signed     Date of Service: 25 155  Creation Time: 25     Signed                 PSYCHIATRIC DISCHARGE SUMMARY      Patient Identification:  Name:  Kamryn Gross  Age:  29 y.o.  Sex:  female  :  1995  MRN:  [id number]  Visit Number:  [id number]     Date of Admission:2025   Date of Discharge:  2025     Discharge Diagnosis:  Active Problems:    Bipolar disorder, unspecified    Borderline personality disorder    Adjustment disorder with mixed disturbance of emotions and conduct    Cannabis use disorder, severe, dependence    Iron deficiency        Admission Diagnosis:  Suicidal ideation [R45.851]          Hospital Course  Patient is a 29 y.o. female presented with depression and SI.  Admitted for crisis stabilization.  Admission labs with MCV 74.1, iron level at 24, UA + nitrite, UDS + opiates/THC.  Patient with recent break-up with 9-year relationship, leading to mood disturbance and emergence of SI.  Patient reports a history of bipolar disorder and BPD.  Recent symptoms appear to be commendation of adjustment disorder and BPD.  Plan to taper off on Pristiq and reinstitute Wellbutrin initiated in the hospital.  Patient reported rapid improvement and denied any active SI, voicing readiness to return home, as she had just gotten news that she got a job which she has been hoping for weeks, with first shift either this weekend or Monday.   "He was involved in treatment and discharge planning, voicing comfort with patient returning home, citing that this type of admission is a regular occurrence for the patient and feel that she will be safe at their home.  Patient appropriate for discharge at this time.     By the conclusion of this hospitalization, patient is exhibiting no acutely concerning symptoms of mood, psychotic or thought disorder that would necessitate further inpatient care. Patient is also denying SI, HI, and AVH. Patient has shown improvement of presenting symptoms, exhibited no behavior concerning for harm to self or others, and is considered appropriate for discharge to a lower level of care today. Treatment and safe discharge planning completed. Outpatient care ascertained.      Mental Status Exam upon discharge:   Mood \"better\"   Affect-congruent, appropriate, stable  Thought Content-goal directed, no delusional material present  Thought process-linear, organized.  Suicidality: No SI  Homicidality: No HI  Perception: No AH/VH     Procedures Performed        Consults:   Consults         No orders found from 12/31/2024 to 1/30/2025.                Pertinent Test Results:   Lab Results (last 7 days)         Procedure Component Value Units Date/Time     Iron Profile [[id number]]  (Abnormal) Collected: 01/29/25 1542     Specimen: Blood Updated: 01/30/25 1139       Iron 24 mcg/dL         Iron Saturation (TSAT) 5 %         Transferrin 316 mg/dL         TIBC 471 mcg/dL       Hepatitis Panel, Acute [[id number]]  (Normal) Collected: 01/29/25 1655     Specimen: Blood Updated: 01/29/25 1722       Hepatitis B Surface Ag Non-Reactive       Hep A IgM Non-Reactive       Hep B C IgM Non-Reactive       Hepatitis C Ab Non-Reactive     Narrative:       Results may be falsely decreased if patient taking Biotin.                 Condition on Discharge:  improved     Vital Signs  Temp:  [97.3 °F (36.3 °C)-97.4 °F (36.3 °C)] 97.3 °F (36.3 °C)  Heart Rate:  " [79-85] 85  Resp:  [18] 18  BP: (153-166)/(90) 166/90     Discharge Disposition:  Home or Self Care     Discharge Medications:      Discharge Medications          New Medications         Instructions Start Date   ARIPiprazole 15 MG tablet  Commonly known as: ABILIFY    15 mg, Oral, Daily    Start Date: February 1, 2025      buPROPion  MG 24 hr tablet  Commonly known as: WELLBUTRIN XL    150 mg, Oral, Daily    Start Date: February 1, 2025      desvenlafaxine 50 MG 24 hr tablet  Commonly known as: Pristiq    50 mg, Oral, Daily        nitrofurantoin (macrocrystal-monohydrate) 100 MG capsule  Commonly known as: MACROBID    100 mg, Oral, Every 12 Hours Scheduled                     Discharge Diet: Normal  Diet Instructions    As tolerated                Activity at Discharge: Normal  Activity Instructions    As tolerated                Follow-up Appointments         Future Appointments   Date Time Provider Department Center   2/3/2025  9:00 AM Madeline Agee [facility]            Test Results Pending at Discharge  None      Time: I spent greater than 30 minutes on this discharge activity which included: face-to-face encounter with the patient, reviewing the data in the system, coordination of the care with the nursing staff as well as consultants, documentation, and entering orders.       Clinician:   Pradip Earl MD  01/31/25  15:55 EST              Routing History